# Patient Record
Sex: FEMALE | Race: WHITE | Employment: FULL TIME | URBAN - NONMETROPOLITAN AREA
[De-identification: names, ages, dates, MRNs, and addresses within clinical notes are randomized per-mention and may not be internally consistent; named-entity substitution may affect disease eponyms.]

---

## 2017-05-15 ENCOUNTER — OFFICE VISIT (OUTPATIENT)
Dept: FAMILY MEDICINE CLINIC | Age: 42
End: 2017-05-15

## 2017-05-15 VITALS
HEIGHT: 64 IN | TEMPERATURE: 98.5 F | BODY MASS INDEX: 40.29 KG/M2 | DIASTOLIC BLOOD PRESSURE: 72 MMHG | SYSTOLIC BLOOD PRESSURE: 108 MMHG | OXYGEN SATURATION: 98 % | HEART RATE: 90 BPM | WEIGHT: 236 LBS

## 2017-05-15 DIAGNOSIS — E66.01 OBESITY, MORBID, BMI 40.0-49.9 (HCC): Primary | ICD-10-CM

## 2017-05-15 PROCEDURE — G0444 DEPRESSION SCREEN ANNUAL: HCPCS | Performed by: NURSE PRACTITIONER

## 2017-05-15 PROCEDURE — 99213 OFFICE O/P EST LOW 20 MIN: CPT | Performed by: NURSE PRACTITIONER

## 2017-05-15 RX ORDER — PHENTERMINE HYDROCHLORIDE 37.5 MG/1
37.5 CAPSULE ORAL EVERY MORNING
Qty: 30 CAPSULE | Refills: 0 | Status: SHIPPED | OUTPATIENT
Start: 2017-05-15 | End: 2017-06-14

## 2017-05-15 ASSESSMENT — PATIENT HEALTH QUESTIONNAIRE - PHQ9
2. FEELING DOWN, DEPRESSED OR HOPELESS: 2
8. MOVING OR SPEAKING SO SLOWLY THAT OTHER PEOPLE COULD HAVE NOTICED. OR THE OPPOSITE, BEING SO FIGETY OR RESTLESS THAT YOU HAVE BEEN MOVING AROUND A LOT MORE THAN USUAL: 0
6. FEELING BAD ABOUT YOURSELF - OR THAT YOU ARE A FAILURE OR HAVE LET YOURSELF OR YOUR FAMILY DOWN: 1
3. TROUBLE FALLING OR STAYING ASLEEP: 2
10. IF YOU CHECKED OFF ANY PROBLEMS, HOW DIFFICULT HAVE THESE PROBLEMS MADE IT FOR YOU TO DO YOUR WORK, TAKE CARE OF THINGS AT HOME, OR GET ALONG WITH OTHER PEOPLE: 1
9. THOUGHTS THAT YOU WOULD BE BETTER OFF DEAD, OR OF HURTING YOURSELF: 0
5. POOR APPETITE OR OVEREATING: 2
4. FEELING TIRED OR HAVING LITTLE ENERGY: 2
7. TROUBLE CONCENTRATING ON THINGS, SUCH AS READING THE NEWSPAPER OR WATCHING TELEVISION: 3
SUM OF ALL RESPONSES TO PHQ9 QUESTIONS 1 & 2: 4
1. LITTLE INTEREST OR PLEASURE IN DOING THINGS: 2
SUM OF ALL RESPONSES TO PHQ QUESTIONS 1-9: 14

## 2017-06-14 ENCOUNTER — OFFICE VISIT (OUTPATIENT)
Dept: FAMILY MEDICINE CLINIC | Age: 42
End: 2017-06-14

## 2017-06-14 VITALS
DIASTOLIC BLOOD PRESSURE: 78 MMHG | TEMPERATURE: 97.7 F | HEART RATE: 94 BPM | SYSTOLIC BLOOD PRESSURE: 112 MMHG | OXYGEN SATURATION: 98 % | WEIGHT: 225.4 LBS | HEIGHT: 64 IN | BODY MASS INDEX: 38.48 KG/M2

## 2017-06-14 DIAGNOSIS — E66.9 OBESITY (BMI 35.0-39.9 WITHOUT COMORBIDITY): Primary | ICD-10-CM

## 2017-06-14 PROCEDURE — 99212 OFFICE O/P EST SF 10 MIN: CPT | Performed by: NURSE PRACTITIONER

## 2017-06-14 RX ORDER — PHENTERMINE HYDROCHLORIDE 37.5 MG/1
37.5 TABLET ORAL
Qty: 30 TABLET | Refills: 0 | Status: SHIPPED | OUTPATIENT
Start: 2017-06-14 | End: 2017-07-18 | Stop reason: SDUPTHER

## 2017-06-14 ASSESSMENT — ENCOUNTER SYMPTOMS: SHORTNESS OF BREATH: 0

## 2017-07-18 ENCOUNTER — OFFICE VISIT (OUTPATIENT)
Dept: FAMILY MEDICINE CLINIC | Age: 42
End: 2017-07-18

## 2017-07-18 VITALS
OXYGEN SATURATION: 98 % | TEMPERATURE: 98.1 F | HEIGHT: 64 IN | SYSTOLIC BLOOD PRESSURE: 122 MMHG | DIASTOLIC BLOOD PRESSURE: 78 MMHG | WEIGHT: 222.4 LBS | BODY MASS INDEX: 37.97 KG/M2 | HEART RATE: 98 BPM

## 2017-07-18 DIAGNOSIS — E66.9 OBESITY (BMI 35.0-39.9 WITHOUT COMORBIDITY): Primary | ICD-10-CM

## 2017-07-18 PROCEDURE — 99212 OFFICE O/P EST SF 10 MIN: CPT | Performed by: NURSE PRACTITIONER

## 2017-07-18 RX ORDER — PHENTERMINE HYDROCHLORIDE 37.5 MG/1
37.5 TABLET ORAL
Qty: 30 TABLET | Refills: 0 | Status: SHIPPED | OUTPATIENT
Start: 2017-07-18 | End: 2017-08-17

## 2017-07-18 RX ORDER — PHENTERMINE HYDROCHLORIDE 37.5 MG/1
37.5 TABLET ORAL
COMMUNITY
End: 2017-07-18

## 2017-07-18 ASSESSMENT — ENCOUNTER SYMPTOMS
COUGH: 0
SHORTNESS OF BREATH: 0

## 2017-08-28 ENCOUNTER — OFFICE VISIT (OUTPATIENT)
Dept: FAMILY MEDICINE CLINIC | Age: 42
End: 2017-08-28

## 2017-08-28 VITALS
TEMPERATURE: 97.5 F | DIASTOLIC BLOOD PRESSURE: 76 MMHG | WEIGHT: 221 LBS | SYSTOLIC BLOOD PRESSURE: 118 MMHG | HEIGHT: 64 IN | BODY MASS INDEX: 37.73 KG/M2

## 2017-08-28 DIAGNOSIS — M54.12 RADICULOPATHY OF CERVICAL SPINE: ICD-10-CM

## 2017-08-28 DIAGNOSIS — E66.9 OBESITY, UNSPECIFIED OBESITY SEVERITY, UNSPECIFIED OBESITY TYPE: Primary | ICD-10-CM

## 2017-08-28 PROCEDURE — 99212 OFFICE O/P EST SF 10 MIN: CPT | Performed by: NURSE PRACTITIONER

## 2017-08-28 RX ORDER — CYCLOBENZAPRINE HCL 10 MG
10 TABLET ORAL NIGHTLY PRN
Qty: 30 TABLET | Refills: 1 | Status: SHIPPED | OUTPATIENT
Start: 2017-08-28 | End: 2019-09-17 | Stop reason: SDUPTHER

## 2017-08-28 ASSESSMENT — ENCOUNTER SYMPTOMS
SHORTNESS OF BREATH: 0
COUGH: 0

## 2017-08-29 ENCOUNTER — TELEPHONE (OUTPATIENT)
Dept: FAMILY MEDICINE CLINIC | Age: 42
End: 2017-08-29

## 2017-08-29 DIAGNOSIS — E66.09 OBESITY DUE TO EXCESS CALORIES, UNSPECIFIED OBESITY SEVERITY: ICD-10-CM

## 2017-08-29 PROBLEM — E66.9 OBESITY: Status: ACTIVE | Noted: 2017-08-29

## 2017-11-02 ENCOUNTER — OFFICE VISIT (OUTPATIENT)
Dept: FAMILY MEDICINE CLINIC | Age: 42
End: 2017-11-02

## 2017-11-02 VITALS
HEIGHT: 64 IN | WEIGHT: 222.4 LBS | TEMPERATURE: 98 F | HEART RATE: 81 BPM | DIASTOLIC BLOOD PRESSURE: 70 MMHG | SYSTOLIC BLOOD PRESSURE: 110 MMHG | BODY MASS INDEX: 37.97 KG/M2 | OXYGEN SATURATION: 98 %

## 2017-11-02 DIAGNOSIS — Z13.220 LIPID SCREENING: ICD-10-CM

## 2017-11-02 DIAGNOSIS — Z13.1 DIABETES MELLITUS SCREENING: ICD-10-CM

## 2017-11-02 DIAGNOSIS — E66.9 OBESITY, UNSPECIFIED OBESITY SEVERITY, UNSPECIFIED OBESITY TYPE: ICD-10-CM

## 2017-11-02 DIAGNOSIS — Z00.00 WELL ADULT EXAM: Primary | ICD-10-CM

## 2017-11-02 LAB
CHOLESTEROL, TOTAL: 161 MG/DL (ref 0–199)
GLUCOSE BLD-MCNC: 79 MG/DL (ref 74–109)
HDLC SERPL-MCNC: 52 MG/DL (ref 40–59)
LDL CHOLESTEROL CALCULATED: 94 MG/DL (ref 0–129)
TRIGL SERPL-MCNC: 77 MG/DL (ref 0–200)

## 2017-11-02 PROCEDURE — 99396 PREV VISIT EST AGE 40-64: CPT | Performed by: NURSE PRACTITIONER

## 2017-11-02 ASSESSMENT — ENCOUNTER SYMPTOMS
SHORTNESS OF BREATH: 0
ABDOMINAL PAIN: 0
COUGH: 0
NAUSEA: 0

## 2017-11-02 NOTE — PROGRESS NOTES
Subjective  Chief Complaint   Patient presents with    3 Month Follow-Up     Aug     Medication Check     pt taking belviq, up 1 lb from Aug. any more suggestions for weight loss     Medication Refill     Belviq will need PA after this fill      HPI     Pt here for a follow-up, taking belviq daily  No serious side effects that she has noticed - some dry mouth, vivid dreams  Is working out 3-4 times a week and watching what she is eating  Feels like the belviq is helping curb her cravings & she is not binge eating   Has gained 1 lb but feels like her clothes are fitting better    Needs labs performed & paperwork filled out for insurance    Patient Active Problem List    Diagnosis Date Noted    Obesity 08/29/2017    Disorder of intervertebral disc at C5-C6 level with radiculopathy 11/22/2016    Cervical disc disorder at C5-C6 level with radiculopathy 10/27/2016    IBS (irritable bowel syndrome) 10/27/2016    Polycystic ovary syndrome 10/27/2016    Asthma 10/27/2016    Depression with anxiety 10/27/2016    Sinus congestion 10/27/2016    Black-out (not amnesia) 10/27/2016    Herniated cervical disc 10/14/2016    Cervical radiculopathy at C6 10/14/2016    Anemia 10/28/2013     Past Medical History:   Diagnosis Date    Anxiety and depression     Asthma     childhood only    Chronic back pain     Depression     Irritable bowel syndrome     Mental disorder     PCOS (polycystic ovarian syndrome)      Past Surgical History:   Procedure Laterality Date    BACK SURGERY      lumbar microdiscectomy x 3     BLADDER SUSPENSION  2013    BREAST BIOPSY Bilateral 1/16/13    U/S Guided core bx of 2 solid nodules int he right and left breast    CERVICAL FUSION N/A 10/28/2016    ACDF ANTERIOR CERVICAL DISKECTOMY FUSION C5-6 USING CC TRIAD HELIX MINI-PLATE, 1.5 HRS, NUVASIVE REP  performed by Mary Espinoza MD at [address] [place] [address]      2    CHOLECYSTECTOMY      COLONOSCOPY      ELBOW SURGERY

## 2017-12-06 ENCOUNTER — TELEPHONE (OUTPATIENT)
Dept: FAMILY MEDICINE CLINIC | Age: 42
End: 2017-12-06

## 2017-12-12 NOTE — TELEPHONE ENCOUNTER
Alternatives for 2018 is contrave. PA for [facility]io faxed, waiting on response before attempting switch!

## 2017-12-19 NOTE — TELEPHONE ENCOUNTER
PA attempted on Belviq and declined. Please see 12/12/17 Media! Looks like they are covering Contrave?

## 2018-02-26 ENCOUNTER — OFFICE VISIT (OUTPATIENT)
Dept: FAMILY MEDICINE CLINIC | Age: 43
End: 2018-02-26
Payer: COMMERCIAL

## 2018-02-26 VITALS
HEART RATE: 81 BPM | TEMPERATURE: 97.4 F | OXYGEN SATURATION: 99 % | SYSTOLIC BLOOD PRESSURE: 118 MMHG | DIASTOLIC BLOOD PRESSURE: 72 MMHG | BODY MASS INDEX: 39.06 KG/M2 | WEIGHT: 228.8 LBS | HEIGHT: 64 IN

## 2018-02-26 PROCEDURE — G8417 CALC BMI ABV UP PARAM F/U: HCPCS | Performed by: NURSE PRACTITIONER

## 2018-02-26 PROCEDURE — 1036F TOBACCO NON-USER: CPT | Performed by: NURSE PRACTITIONER

## 2018-02-26 PROCEDURE — G8427 DOCREV CUR MEDS BY ELIG CLIN: HCPCS | Performed by: NURSE PRACTITIONER

## 2018-02-26 PROCEDURE — G8482 FLU IMMUNIZE ORDER/ADMIN: HCPCS | Performed by: NURSE PRACTITIONER

## 2018-02-26 PROCEDURE — 99213 OFFICE O/P EST LOW 20 MIN: CPT | Performed by: NURSE PRACTITIONER

## 2018-02-26 ASSESSMENT — ENCOUNTER SYMPTOMS
SHORTNESS OF BREATH: 0
COUGH: 0

## 2018-02-26 NOTE — PROGRESS NOTES
DIAGNOSTIC      FRACTURE SURGERY      fx / left elbow / pins in & then removed    GALLBLADDER SURGERY      HYSTERECTOMY, TOTAL ABDOMINAL  04/07/16     [address] [facility]    lumbar    TUBAL LIGATION      UMBILICAL HERNIA REPAIR  10/20/15    Dirk Sanchez MD     Family History   Problem Relation Age of Onset   [facility] Cancer Mother 47     breast    Hypertension Mother     High Blood Pressure Mother     Other Father      car accident    Cancer Maternal Grandmother      colon    Diabetes Paternal Grandfather      Social History     Social History    Marital status:      Spouse name: N/A    Number of children: N/A    Years of education: N/A     Social History Main Topics    Smoking status: Former Smoker     Packs/day: 0.50     Years: 10.00     Types: Cigarettes     Quit date: 11/14/2004    Smokeless tobacco: Never Used    Alcohol use Yes      Comment: socially    Drug use: No    Sexual activity: Yes     Other Topics Concern    None     Social History Narrative    None     Current Outpatient Prescriptions on File Prior to Visit   Medication Sig Dispense Refill    cyclobenzaprine (FLEXERIL) 10 MG tablet Take 1 tablet by mouth nightly as needed for Muscle spasms 30 tablet 1    NONFORMULARY 2 capsules 2 times daily biotears      cetirizine (ZYRTEC) 10 MG tablet Take 10 mg by mouth daily as needed       traMADol (ULTRAM) 50 MG tablet Take 1 tablet by mouth every 6 hours as needed for Pain 180 tablet 1    Biotin 1000 MCG TABS Take  by mouth. No current facility-administered medications on file prior to visit. Allergies   Allergen Reactions    Adhesive Tape Rash    Macrobid [Nitrofurantoin Monohydrate Macrocrystals] Rash    Sulfa Antibiotics Rash       Review of Systems   Respiratory: Negative for cough and shortness of breath. Cardiovascular: Negative for chest pain.        Objective  Vitals:    02/26/18 0801   BP: 118/72   Site: Left Arm   Position: Sitting

## 2018-05-23 ENCOUNTER — OFFICE VISIT (OUTPATIENT)
Dept: FAMILY MEDICINE CLINIC | Age: 43
End: 2018-05-23
Payer: COMMERCIAL

## 2018-05-23 VITALS
SYSTOLIC BLOOD PRESSURE: 122 MMHG | TEMPERATURE: 98 F | HEART RATE: 97 BPM | DIASTOLIC BLOOD PRESSURE: 72 MMHG | HEIGHT: 64 IN | OXYGEN SATURATION: 98 % | BODY MASS INDEX: 37.05 KG/M2 | WEIGHT: 217 LBS

## 2018-05-23 DIAGNOSIS — E66.09 CLASS 2 OBESITY DUE TO EXCESS CALORIES WITHOUT SERIOUS COMORBIDITY WITH BODY MASS INDEX (BMI) OF 37.0 TO 37.9 IN ADULT: Primary | ICD-10-CM

## 2018-05-23 DIAGNOSIS — F32.A DEPRESSION, UNSPECIFIED DEPRESSION TYPE: ICD-10-CM

## 2018-05-23 DIAGNOSIS — Z13.31 POSITIVE DEPRESSION SCREENING: ICD-10-CM

## 2018-05-23 PROCEDURE — G8417 CALC BMI ABV UP PARAM F/U: HCPCS | Performed by: NURSE PRACTITIONER

## 2018-05-23 PROCEDURE — 96160 PT-FOCUSED HLTH RISK ASSMT: CPT | Performed by: NURSE PRACTITIONER

## 2018-05-23 PROCEDURE — 99213 OFFICE O/P EST LOW 20 MIN: CPT | Performed by: NURSE PRACTITIONER

## 2018-05-23 PROCEDURE — 1036F TOBACCO NON-USER: CPT | Performed by: NURSE PRACTITIONER

## 2018-05-23 PROCEDURE — G8431 POS CLIN DEPRES SCRN F/U DOC: HCPCS | Performed by: NURSE PRACTITIONER

## 2018-05-23 PROCEDURE — G8427 DOCREV CUR MEDS BY ELIG CLIN: HCPCS | Performed by: NURSE PRACTITIONER

## 2018-05-23 ASSESSMENT — PATIENT HEALTH QUESTIONNAIRE - PHQ9
7. TROUBLE CONCENTRATING ON THINGS, SUCH AS READING THE NEWSPAPER OR WATCHING TELEVISION: 3
9. THOUGHTS THAT YOU WOULD BE BETTER OFF DEAD, OR OF HURTING YOURSELF: 0
2. FEELING DOWN, DEPRESSED OR HOPELESS: 2
8. MOVING OR SPEAKING SO SLOWLY THAT OTHER PEOPLE COULD HAVE NOTICED. OR THE OPPOSITE, BEING SO FIGETY OR RESTLESS THAT YOU HAVE BEEN MOVING AROUND A LOT MORE THAN USUAL: 0
SUM OF ALL RESPONSES TO PHQ9 QUESTIONS 1 & 2: 5
10. IF YOU CHECKED OFF ANY PROBLEMS, HOW DIFFICULT HAVE THESE PROBLEMS MADE IT FOR YOU TO DO YOUR WORK, TAKE CARE OF THINGS AT HOME, OR GET ALONG WITH OTHER PEOPLE: 2
SUM OF ALL RESPONSES TO PHQ QUESTIONS 1-9: 17
4. FEELING TIRED OR HAVING LITTLE ENERGY: 3
6. FEELING BAD ABOUT YOURSELF - OR THAT YOU ARE A FAILURE OR HAVE LET YOURSELF OR YOUR FAMILY DOWN: 1
5. POOR APPETITE OR OVEREATING: 3
3. TROUBLE FALLING OR STAYING ASLEEP: 2
1. LITTLE INTEREST OR PLEASURE IN DOING THINGS: 3

## 2018-05-23 ASSESSMENT — ENCOUNTER SYMPTOMS
SHORTNESS OF BREATH: 0
COUGH: 0

## 2018-08-23 ENCOUNTER — OFFICE VISIT (OUTPATIENT)
Dept: FAMILY MEDICINE CLINIC | Age: 43
End: 2018-08-23
Payer: COMMERCIAL

## 2018-08-23 VITALS
HEIGHT: 64 IN | SYSTOLIC BLOOD PRESSURE: 128 MMHG | DIASTOLIC BLOOD PRESSURE: 80 MMHG | BODY MASS INDEX: 33.43 KG/M2 | HEART RATE: 86 BPM | WEIGHT: 195.8 LBS | OXYGEN SATURATION: 99 % | TEMPERATURE: 98.1 F

## 2018-08-23 DIAGNOSIS — E66.9 OBESITY (BMI 30.0-34.9): Primary | ICD-10-CM

## 2018-08-23 PROCEDURE — G8427 DOCREV CUR MEDS BY ELIG CLIN: HCPCS | Performed by: NURSE PRACTITIONER

## 2018-08-23 PROCEDURE — 99213 OFFICE O/P EST LOW 20 MIN: CPT | Performed by: NURSE PRACTITIONER

## 2018-08-23 PROCEDURE — 1036F TOBACCO NON-USER: CPT | Performed by: NURSE PRACTITIONER

## 2018-08-23 PROCEDURE — G8417 CALC BMI ABV UP PARAM F/U: HCPCS | Performed by: NURSE PRACTITIONER

## 2018-08-23 ASSESSMENT — ENCOUNTER SYMPTOMS
SHORTNESS OF BREATH: 0
COUGH: 0
DIARRHEA: 0
CONSTIPATION: 0

## 2018-08-23 NOTE — PROGRESS NOTES
08/23/18 0853   BP: 128/80   Pulse: 86   Temp: 98.1 °F (36.7 °C)   TempSrc: Tympanic   SpO2: 99%   Weight: 195 lb 12.8 oz (88.8 kg)   Height: 5' 4\" (1.626 m)     Physical Exam   Constitutional: She is oriented to person, place, and time. She appears well-developed and well-nourished. Eyes: Pupils are equal, round, and reactive to light. Conjunctivae are normal.   Cardiovascular: Normal rate, regular rhythm, normal heart sounds and intact distal pulses. Pulmonary/Chest: Effort normal and breath sounds normal.   Neurological: She is alert and oriented to person, place, and time. Psychiatric: She has a normal mood and affect. Her behavior is normal. Judgment and thought content normal.     Assessment & Plan     Diagnosis Orders   1. Obesity (BMI 30.0-34.9)  naltrexone-bupropion (CONTRAVE) 8-90 MG per extended release tablet       No orders of the defined types were placed in this encounter. Orders Placed This Encounter   Medications    naltrexone-bupropion (CONTRAVE) 8-90 MG per extended release tablet     Sig: Take 2 tablets by mouth 2 times daily     Dispense:  120 tablet     Refill:  1       Side effects, adverse effects of the medication prescribed today, as well as treatment plan/ rationale and result expectations have been discussed with the patient who expresses understanding and desires to proceed. Close follow up to evaluate treatment results and for coordination of care. I have reviewed the patient's medical history in detail and updated the computerized patient record. As always, patient is advised that if symptoms worsen in any way they will proceed to the nearest emergency room. [place] in 2 mos.     MAURICIO Hernandez - CNP

## 2018-08-29 ENCOUNTER — OFFICE VISIT (OUTPATIENT)
Dept: FAMILY MEDICINE CLINIC | Age: 43
End: 2018-08-29
Payer: COMMERCIAL

## 2018-08-29 VITALS
SYSTOLIC BLOOD PRESSURE: 124 MMHG | WEIGHT: 186 LBS | BODY MASS INDEX: 31.76 KG/M2 | TEMPERATURE: 98.7 F | OXYGEN SATURATION: 98 % | HEART RATE: 86 BPM | HEIGHT: 64 IN | DIASTOLIC BLOOD PRESSURE: 60 MMHG

## 2018-08-29 DIAGNOSIS — R73.9 HYPERGLYCEMIA: ICD-10-CM

## 2018-08-29 DIAGNOSIS — L03.116 CELLULITIS OF LEFT LOWER EXTREMITY: ICD-10-CM

## 2018-08-29 DIAGNOSIS — T24.232A PARTIAL THICKNESS BURN OF LEFT LOWER LEG, INITIAL ENCOUNTER: Primary | ICD-10-CM

## 2018-08-29 PROCEDURE — 1036F TOBACCO NON-USER: CPT | Performed by: NURSE PRACTITIONER

## 2018-08-29 PROCEDURE — G8427 DOCREV CUR MEDS BY ELIG CLIN: HCPCS | Performed by: NURSE PRACTITIONER

## 2018-08-29 PROCEDURE — 99213 OFFICE O/P EST LOW 20 MIN: CPT | Performed by: NURSE PRACTITIONER

## 2018-08-29 PROCEDURE — G8417 CALC BMI ABV UP PARAM F/U: HCPCS | Performed by: NURSE PRACTITIONER

## 2018-08-29 RX ORDER — CEPHALEXIN 500 MG/1
500 CAPSULE ORAL 3 TIMES DAILY
Qty: 30 CAPSULE | Refills: 0 | Status: SHIPPED | OUTPATIENT
Start: 2018-08-29 | End: 2018-09-08

## 2018-08-29 ASSESSMENT — ENCOUNTER SYMPTOMS
RESPIRATORY NEGATIVE: 1
GASTROINTESTINAL NEGATIVE: 1
COLOR CHANGE: 1

## 2018-08-29 NOTE — PROGRESS NOTES
elbow / pins in & then removed    GALLBLADDER SURGERY      HYSTERECTOMY, TOTAL ABDOMINAL  04/07/16     [facility]    lumbar    TUBAL LIGATION      UMBILICAL HERNIA REPAIR  10/20/15    Wagner Alvarez MD     Family History   Problem Relation Age of Onset   [facility] Cancer Mother 47        breast    Hypertension Mother     High Blood Pressure Mother     Other Father         car accident    Cancer Maternal Grandmother         colon    Diabetes Paternal Grandfather      Social History     Social History    Marital status:      Spouse name: N/A    Number of children: N/A    Years of education: N/A     Social History Main Topics    Smoking status: Former Smoker     Packs/day: 0.50     Years: 10.00     Types: Cigarettes     Quit date: 11/14/2004    Smokeless tobacco: Never Used    Alcohol use Yes      Comment: socially    Drug use: No    Sexual activity: Yes     Other Topics Concern    None     Social History Narrative    None     Current Outpatient Prescriptions on File Prior to Visit   Medication Sig Dispense Refill    naltrexone-bupropion (CONTRAVE) 8-90 MG per extended release tablet Take 2 tablets by mouth 2 times daily 120 tablet 1    cyclobenzaprine (FLEXERIL) 10 MG tablet Take 1 tablet by mouth nightly as needed for Muscle spasms 30 tablet 1    NONFORMULARY 2 capsules 2 times daily biotears      cetirizine (ZYRTEC) 10 MG tablet Take 10 mg by mouth daily as needed       traMADol (ULTRAM) 50 MG tablet Take 1 tablet by mouth every 6 hours as needed for Pain 180 tablet 1    Biotin 1000 MCG TABS Take  by mouth. No current facility-administered medications on file prior to visit. Allergies   Allergen Reactions    Adhesive Tape Rash    Macrobid [Nitrofurantoin Monohydrate Macrocrystals] Rash    Sulfa Antibiotics Rash       Review of Systems   Constitutional: Negative. HENT: Negative. Respiratory: Negative. Cardiovascular: Negative. capsule by mouth 3 times daily for 10 days     Dispense:  30 capsule     Refill:  0     Discussed with pt to change dressings daily prn, until wound care consult is complete and to continue silvedene prn. Discussed with pt to take kefflex as directed until wound care consult begins. Side effects, adverse effects of the medication prescribed today, as well as treatment plan/ rationale and result expectations have been discussed with the patient who expresses understanding and desires to proceed. Close follow up to evaluate treatment results and for coordination of care. I have reviewed the patient's medical history in detail and updated the computerized patient record. As always, patient is advised that if symptoms worsen in any way they will proceed to the nearest emergency room.      Return if symptoms worsen or fail to improve, for burn sx unresolved based on wound care referral.    Luisa Perez, APRN - CNP

## 2018-10-22 ENCOUNTER — OFFICE VISIT (OUTPATIENT)
Dept: FAMILY MEDICINE CLINIC | Age: 43
End: 2018-10-22
Payer: COMMERCIAL

## 2018-10-22 VITALS
HEIGHT: 64 IN | WEIGHT: 193 LBS | BODY MASS INDEX: 32.95 KG/M2 | TEMPERATURE: 98.1 F | OXYGEN SATURATION: 99 % | DIASTOLIC BLOOD PRESSURE: 80 MMHG | SYSTOLIC BLOOD PRESSURE: 114 MMHG | HEART RATE: 94 BPM

## 2018-10-22 DIAGNOSIS — Z23 NEED FOR PNEUMOCOCCAL VACCINATION: ICD-10-CM

## 2018-10-22 DIAGNOSIS — E66.9 OBESITY (BMI 30.0-34.9): ICD-10-CM

## 2018-10-22 DIAGNOSIS — E66.01 CLASS 3 SEVERE OBESITY DUE TO EXCESS CALORIES WITHOUT SERIOUS COMORBIDITY WITH BODY MASS INDEX (BMI) OF 40.0 TO 44.9 IN ADULT (HCC): Primary | ICD-10-CM

## 2018-10-22 PROCEDURE — 99213 OFFICE O/P EST LOW 20 MIN: CPT | Performed by: NURSE PRACTITIONER

## 2018-10-22 PROCEDURE — 90471 IMMUNIZATION ADMIN: CPT | Performed by: NURSE PRACTITIONER

## 2018-10-22 PROCEDURE — G8417 CALC BMI ABV UP PARAM F/U: HCPCS | Performed by: NURSE PRACTITIONER

## 2018-10-22 PROCEDURE — 1036F TOBACCO NON-USER: CPT | Performed by: NURSE PRACTITIONER

## 2018-10-22 PROCEDURE — G8427 DOCREV CUR MEDS BY ELIG CLIN: HCPCS | Performed by: NURSE PRACTITIONER

## 2018-10-22 PROCEDURE — 90732 PPSV23 VACC 2 YRS+ SUBQ/IM: CPT | Performed by: NURSE PRACTITIONER

## 2018-10-22 PROCEDURE — G8484 FLU IMMUNIZE NO ADMIN: HCPCS | Performed by: NURSE PRACTITIONER

## 2018-10-22 ASSESSMENT — ENCOUNTER SYMPTOMS: SHORTNESS OF BREATH: 0

## 2018-10-22 NOTE — PROGRESS NOTES
ABLATION  2014    ENDOSCOPY, COLON, DIAGNOSTIC      FRACTURE SURGERY      fx / left elbow / pins in & then removed    GALLBLADDER SURGERY      HYSTERECTOMY, TOTAL ABDOMINAL  04/07/16     [facility]  [facility]    lumbar    TUBAL LIGATION      UMBILICAL HERNIA REPAIR  10/20/15    Janis Davies MD     Family History   Problem Relation Age of Onset   [facility] Cancer Mother 47        breast    Hypertension Mother     High Blood Pressure Mother     Other Father         car accident    Cancer Maternal Grandmother         colon    Diabetes Paternal Grandfather      Social History     Social History    Marital status:      Spouse name: N/A    Number of children: N/A    Years of education: N/A     Social History Main Topics    Smoking status: Former Smoker     Packs/day: 0.50     Years: 10.00     Types: Cigarettes     Quit date: 11/14/2004    Smokeless tobacco: Never Used    Alcohol use Yes      Comment: socially    Drug use: No    Sexual activity: Yes     Other Topics Concern    None     Social History Narrative    None     Current Outpatient Prescriptions on File Prior to Visit   Medication Sig Dispense Refill    naltrexone-bupropion (CONTRAVE) 8-90 MG per extended release tablet Take 2 tablets by mouth 2 times daily 120 tablet 1    cyclobenzaprine (FLEXERIL) 10 MG tablet Take 1 tablet by mouth nightly as needed for Muscle spasms 30 tablet 1    NONFORMULARY 2 capsules 2 times daily biotears      cetirizine (ZYRTEC) 10 MG tablet Take 10 mg by mouth daily as needed       traMADol (ULTRAM) 50 MG tablet Take 1 tablet by mouth every 6 hours as needed for Pain 180 tablet 1    Biotin 1000 MCG TABS Take  by mouth. No current facility-administered medications on file prior to visit.       Allergies   Allergen Reactions    Adhesive Tape Rash    Macrobid [Nitrofurantoin Monohydrate Macrocrystals] Rash    Sulfa Antibiotics Rash       Review of Systems   Constitutional: Negative

## 2018-11-05 ENCOUNTER — OFFICE VISIT (OUTPATIENT)
Dept: FAMILY MEDICINE CLINIC | Age: 43
End: 2018-11-05
Payer: COMMERCIAL

## 2018-11-05 VITALS
TEMPERATURE: 97.9 F | HEIGHT: 64 IN | HEART RATE: 89 BPM | DIASTOLIC BLOOD PRESSURE: 68 MMHG | SYSTOLIC BLOOD PRESSURE: 124 MMHG | WEIGHT: 193 LBS | BODY MASS INDEX: 32.95 KG/M2 | OXYGEN SATURATION: 98 %

## 2018-11-05 DIAGNOSIS — R10.13 EPIGASTRIC PAIN: ICD-10-CM

## 2018-11-05 DIAGNOSIS — Z13.220 LIPID SCREENING: ICD-10-CM

## 2018-11-05 DIAGNOSIS — Z13.1 DIABETES MELLITUS SCREENING: ICD-10-CM

## 2018-11-05 DIAGNOSIS — M54.50 CHRONIC MIDLINE LOW BACK PAIN WITHOUT SCIATICA: ICD-10-CM

## 2018-11-05 DIAGNOSIS — Z00.00 WELL ADULT EXAM: Primary | ICD-10-CM

## 2018-11-05 DIAGNOSIS — G89.29 CHRONIC MIDLINE LOW BACK PAIN WITHOUT SCIATICA: ICD-10-CM

## 2018-11-05 LAB
CHOLESTEROL, TOTAL: 198 MG/DL (ref 0–199)
GLUCOSE FASTING: 67 MG/DL (ref 74–109)
HDLC SERPL-MCNC: 66 MG/DL (ref 40–59)
LDL CHOLESTEROL CALCULATED: 106 MG/DL (ref 0–129)
TRIGL SERPL-MCNC: 131 MG/DL (ref 0–200)

## 2018-11-05 PROCEDURE — G8484 FLU IMMUNIZE NO ADMIN: HCPCS | Performed by: NURSE PRACTITIONER

## 2018-11-05 PROCEDURE — 99396 PREV VISIT EST AGE 40-64: CPT | Performed by: NURSE PRACTITIONER

## 2018-11-05 RX ORDER — TRAMADOL HYDROCHLORIDE 50 MG/1
50 TABLET ORAL EVERY 8 HOURS PRN
Qty: 90 TABLET | Refills: 0 | Status: SHIPPED | OUTPATIENT
Start: 2018-11-05 | End: 2018-12-05

## 2018-11-05 ASSESSMENT — ENCOUNTER SYMPTOMS
EYES NEGATIVE: 1
RESPIRATORY NEGATIVE: 1
BACK PAIN: 1
ABDOMINAL PAIN: 1

## 2018-11-05 NOTE — PROGRESS NOTES
Subjective  Chief Complaint   Patient presents with    Employment Physical    Medication Refill       HPI     Pt presents for well adult exam.   Overall pt has been doing well. Has been watching diet and trying to exercise more while on the contrave. Has been having abdominal pain nearly every other day. Hx of hernia repair, pain is in similar location. States that sometimes it feels like its bulging. Has been compliant with medications. Also has chronic pack and hip pain that has been going on for several years. Has been taking NSAIDS which provide occasional relief.     Patient Active Problem List    Diagnosis Date Noted    Obesity 08/29/2017    Disorder of intervertebral disc at C5-C6 level with radiculopathy 11/22/2016    Cervical disc disorder at C5-C6 level with radiculopathy 10/27/2016    IBS (irritable bowel syndrome) 10/27/2016    Polycystic ovary syndrome 10/27/2016    Asthma 10/27/2016    Depression with anxiety 10/27/2016    Sinus congestion 10/27/2016    Black-out (not amnesia) 10/27/2016    Herniated cervical disc 10/14/2016    Cervical radiculopathy at C6 10/14/2016    Anemia 10/28/2013     Past Medical History:   Diagnosis Date    Anxiety and depression     Asthma     childhood only    Chronic back pain     Depression     Irritable bowel syndrome     Mental disorder     PCOS (polycystic ovarian syndrome)      Past Surgical History:   Procedure Laterality Date    BACK SURGERY      lumbar microdiscectomy x 3     BLADDER SUSPENSION  2013    BREAST BIOPSY Bilateral 1/16/13    U/S Guided core bx of 2 solid nodules int he right and left breast    CERVICAL FUSION N/A 10/28/2016    ACDF ANTERIOR CERVICAL DISKECTOMY FUSION C5-6 USING CC TRIAD HELIX MINI-PLATE, 1.5 HRS, NUVASIVE REP  performed by Den Guerrero MD at [address] [address]      2    CHOLECYSTECTOMY      COLONOSCOPY      ELBOW SURGERY      pins in and out, pt broke tip of elbow    ENDOMETRIAL ABLATION  2014    ENDOSCOPY, COLON, DIAGNOSTIC      FRACTURE SURGERY      fx / left elbow / pins in & then removed    GALLBLADDER SURGERY      HYSTERECTOMY, TOTAL ABDOMINAL  04/07/16     [address] [facility]  [facility]    lumbar    TUBAL LIGATION      UMBILICAL HERNIA REPAIR  10/20/15    Rojelio Steiner MD     Family History   Problem Relation Age of Onset   [employer] Cancer Mother 47        breast    Hypertension Mother     High Blood Pressure Mother     Other Father         car accident    Cancer Maternal Grandmother         colon    Diabetes Paternal Grandfather      Social History     Social History    Marital status:      Spouse name: N/A    Number of children: N/A    Years of education: N/A     Social History Main Topics    Smoking status: Former Smoker     Packs/day: 0.50     Years: 10.00     Types: Cigarettes     Quit date: 11/14/2004    Smokeless tobacco: Never Used    Alcohol use Yes      Comment: socially    Drug use: No    Sexual activity: Yes     Other Topics Concern    Not on file     Social History Narrative    No narrative on file     Current Outpatient Prescriptions on File Prior to Visit   Medication Sig Dispense Refill    naltrexone-bupropion (CONTRAVE) 8-90 MG per extended release tablet Take 2 tablets by mouth 2 times daily 120 tablet 1    cyclobenzaprine (FLEXERIL) 10 MG tablet Take 1 tablet by mouth nightly as needed for Muscle spasms 30 tablet 1    NONFORMULARY 2 capsules 2 times daily biotears      cetirizine (ZYRTEC) 10 MG tablet Take 10 mg by mouth daily as needed       Biotin 1000 MCG TABS Take  by mouth. No current facility-administered medications on file prior to visit. Allergies   Allergen Reactions    Adhesive Tape Rash    Macrobid [Nitrofurantoin Monohydrate Macrocrystals] Rash    Sulfa Antibiotics Rash       Review of Systems   Constitutional: Negative. HENT: Negative. Eyes: Negative. Respiratory: Negative.

## 2018-11-13 ENCOUNTER — HOSPITAL ENCOUNTER (OUTPATIENT)
Dept: CT IMAGING | Age: 43
Discharge: HOME | End: 2018-11-15
Payer: COMMERCIAL

## 2018-11-13 DIAGNOSIS — K43.9 SUPRAUMBILICAL HERNIA: Primary | ICD-10-CM

## 2018-11-13 DIAGNOSIS — R10.13 EPIGASTRIC PAIN: ICD-10-CM

## 2018-11-13 PROCEDURE — 2500000003 HC RX 250 WO HCPCS: Performed by: NURSE PRACTITIONER

## 2018-11-13 PROCEDURE — 6360000004 HC RX CONTRAST MEDICATION: Performed by: NURSE PRACTITIONER

## 2018-11-13 PROCEDURE — 74177 CT ABD & PELVIS W/CONTRAST: CPT

## 2018-11-13 RX ADMIN — IOPAMIDOL 100 ML: 755 INJECTION, SOLUTION INTRAVENOUS at 13:36

## 2018-11-13 RX ADMIN — BARIUM SULFATE 450 ML: 20 SUSPENSION ORAL at 12:37

## 2018-11-23 ENCOUNTER — OFFICE VISIT (OUTPATIENT)
Dept: SURGERY | Age: 43
End: 2018-11-23
Payer: COMMERCIAL

## 2018-11-23 VITALS
HEIGHT: 64 IN | BODY MASS INDEX: 34.66 KG/M2 | WEIGHT: 203 LBS | TEMPERATURE: 97.6 F | SYSTOLIC BLOOD PRESSURE: 110 MMHG | DIASTOLIC BLOOD PRESSURE: 70 MMHG

## 2018-11-23 DIAGNOSIS — K43.2 RECURRENT VENTRAL HERNIA: ICD-10-CM

## 2018-11-23 PROCEDURE — 99213 OFFICE O/P EST LOW 20 MIN: CPT | Performed by: SURGERY

## 2018-11-23 PROCEDURE — G8484 FLU IMMUNIZE NO ADMIN: HCPCS | Performed by: SURGERY

## 2018-11-23 PROCEDURE — 1036F TOBACCO NON-USER: CPT | Performed by: SURGERY

## 2018-11-23 PROCEDURE — G8427 DOCREV CUR MEDS BY ELIG CLIN: HCPCS | Performed by: SURGERY

## 2018-11-23 PROCEDURE — G8417 CALC BMI ABV UP PARAM F/U: HCPCS | Performed by: SURGERY

## 2018-11-23 NOTE — PROGRESS NOTES
biotears      cetirizine (ZYRTEC) 10 MG tablet Take 10 mg by mouth daily as needed       Biotin 1000 MCG TABS Take  by mouth. No current facility-administered medications on file prior to visit. Allergies   Allergen Reactions    Adhesive Tape Rash    Macrobid [Nitrofurantoin Monohydrate Macrocrystals] Rash    Sulfa Antibiotics Rash     Family History   Problem Relation Age of Onset   Kristine Giron Cancer Mother 47        breast    Hypertension Mother     High Blood Pressure Mother     Other Father         car accident    Cancer Maternal Grandmother         colon    Diabetes Paternal Grandfather      Social History   Substance Use Topics    Smoking status: Former Smoker     Packs/day: 0.50     Years: 10.00     Types: Cigarettes     Quit date: 11/14/2004    Smokeless tobacco: Never Used    Alcohol use Yes      Comment: socially     Review of Systems  The pain is intermittent and worse with activity. The pain does wake her at night at times. There is some nausea but no vomiting. She has irritable bowel syndrome but has noted more problems with constipation lately. She has lost weight since her previous surgery. 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 Objective:   Physical Exam   Abdominal:         She does not appear ill or toxic. Breath sounds are clear. The cardiac exam is fine. There is no murmur. The abdomen is not distended. There is a well healed surgical scar inferior to the umbilicus. There is a vague tender mass noted in the midline just above the umbilicus. I reviewed the report of CT scan abdomen and pelvis done 11/13/2018. There is a 0.9 mm fat filled anterior wall defect, 9 mm at the superior aspect of the mesh. Bilateral adnexal cysts are noted. Assessment:      Recurrent ventral hernia, probably at the top of the previously placed mesh. Plan:      Because of the discomfort she would like the hernia repaired. Details of the surgery were reviewed with her.  I

## 2018-12-06 ENCOUNTER — HOSPITAL ENCOUNTER (OUTPATIENT)
Dept: PREADMISSION TESTING | Age: 43
Discharge: HOME | End: 2018-12-10
Payer: COMMERCIAL

## 2018-12-06 VITALS
WEIGHT: 202.8 LBS | DIASTOLIC BLOOD PRESSURE: 68 MMHG | HEIGHT: 65 IN | BODY MASS INDEX: 33.79 KG/M2 | OXYGEN SATURATION: 99 % | HEART RATE: 85 BPM | TEMPERATURE: 97.3 F | RESPIRATION RATE: 18 BRPM | SYSTOLIC BLOOD PRESSURE: 115 MMHG

## 2018-12-06 LAB
HCT VFR BLD CALC: 40 % (ref 37–47)
HEMOGLOBIN: 13.7 G/DL (ref 12–16)
MCH RBC QN AUTO: 31.7 PG (ref 27–31.3)
MCHC RBC AUTO-ENTMCNC: 34.2 % (ref 33–37)
MCV RBC AUTO: 92.7 FL (ref 82–100)
PDW BLD-RTO: 13.8 % (ref 11.5–14.5)
PLATELET # BLD: 283 K/UL (ref 130–400)
RBC # BLD: 4.32 M/UL (ref 4.2–5.4)
WBC # BLD: 6.9 K/UL (ref 4.8–10.8)

## 2018-12-06 PROCEDURE — 85027 COMPLETE CBC AUTOMATED: CPT

## 2018-12-06 RX ORDER — SODIUM CHLORIDE 9 MG/ML
INJECTION, SOLUTION INTRAVENOUS CONTINUOUS
Status: CANCELLED | OUTPATIENT
Start: 2018-12-13

## 2018-12-06 RX ORDER — SODIUM CHLORIDE 0.9 % (FLUSH) 0.9 %
10 SYRINGE (ML) INJECTION EVERY 12 HOURS SCHEDULED
Status: CANCELLED | OUTPATIENT
Start: 2018-12-13

## 2018-12-06 RX ORDER — SODIUM CHLORIDE, SODIUM LACTATE, POTASSIUM CHLORIDE, CALCIUM CHLORIDE 600; 310; 30; 20 MG/100ML; MG/100ML; MG/100ML; MG/100ML
INJECTION, SOLUTION INTRAVENOUS CONTINUOUS
Status: CANCELLED | OUTPATIENT
Start: 2018-12-13

## 2018-12-06 RX ORDER — SODIUM CHLORIDE 0.9 % (FLUSH) 0.9 %
10 SYRINGE (ML) INJECTION PRN
Status: CANCELLED | OUTPATIENT
Start: 2018-12-13

## 2018-12-06 RX ORDER — LIDOCAINE HYDROCHLORIDE 10 MG/ML
1 INJECTION, SOLUTION EPIDURAL; INFILTRATION; INTRACAUDAL; PERINEURAL
Status: CANCELLED | OUTPATIENT
Start: 2018-12-13 | End: 2018-12-13

## 2018-12-13 ENCOUNTER — HOSPITAL ENCOUNTER (OUTPATIENT)
Age: 43
Setting detail: OUTPATIENT SURGERY
Discharge: HOME | End: 2018-12-13
Attending: SURGERY | Admitting: SURGERY
Payer: COMMERCIAL

## 2018-12-13 ENCOUNTER — ANESTHESIA (OUTPATIENT)
Dept: OPERATING ROOM | Age: 43
End: 2018-12-13
Payer: COMMERCIAL

## 2018-12-13 ENCOUNTER — ANESTHESIA EVENT (OUTPATIENT)
Dept: OPERATING ROOM | Age: 43
End: 2018-12-13
Payer: COMMERCIAL

## 2018-12-13 VITALS — OXYGEN SATURATION: 100 % | TEMPERATURE: 96.1 F | DIASTOLIC BLOOD PRESSURE: 67 MMHG | SYSTOLIC BLOOD PRESSURE: 124 MMHG

## 2018-12-13 VITALS
BODY MASS INDEX: 33.66 KG/M2 | HEIGHT: 65 IN | SYSTOLIC BLOOD PRESSURE: 128 MMHG | HEART RATE: 74 BPM | WEIGHT: 202 LBS | TEMPERATURE: 97.9 F | OXYGEN SATURATION: 97 % | DIASTOLIC BLOOD PRESSURE: 78 MMHG | RESPIRATION RATE: 16 BRPM

## 2018-12-13 DIAGNOSIS — G89.18 POST-OP PAIN: ICD-10-CM

## 2018-12-13 DIAGNOSIS — K43.2 RECURRENT VENTRAL HERNIA: Primary | ICD-10-CM

## 2018-12-13 PROCEDURE — C1781 MESH (IMPLANTABLE): HCPCS | Performed by: SURGERY

## 2018-12-13 PROCEDURE — 7100000001 HC PACU RECOVERY - ADDTL 15 MIN: Performed by: SURGERY

## 2018-12-13 PROCEDURE — 2709999900 HC NON-CHARGEABLE SUPPLY: Performed by: SURGERY

## 2018-12-13 PROCEDURE — 7100000010 HC PHASE II RECOVERY - FIRST 15 MIN: Performed by: SURGERY

## 2018-12-13 PROCEDURE — 3700000001 HC ADD 15 MINUTES (ANESTHESIA): Performed by: SURGERY

## 2018-12-13 PROCEDURE — 2500000003 HC RX 250 WO HCPCS: Performed by: SURGERY

## 2018-12-13 PROCEDURE — 49565 PR REPAIR RECURR INCIS HERNIA,REDUC: CPT | Performed by: SURGERY

## 2018-12-13 PROCEDURE — 6360000002 HC RX W HCPCS: Performed by: NURSE ANESTHETIST, CERTIFIED REGISTERED

## 2018-12-13 PROCEDURE — 6360000002 HC RX W HCPCS: Performed by: STUDENT IN AN ORGANIZED HEALTH CARE EDUCATION/TRAINING PROGRAM

## 2018-12-13 PROCEDURE — 6370000000 HC RX 637 (ALT 250 FOR IP): Performed by: SURGERY

## 2018-12-13 PROCEDURE — 2580000003 HC RX 258: Performed by: NURSE PRACTITIONER

## 2018-12-13 PROCEDURE — 6360000002 HC RX W HCPCS: Performed by: SURGERY

## 2018-12-13 PROCEDURE — 7100000011 HC PHASE II RECOVERY - ADDTL 15 MIN: Performed by: SURGERY

## 2018-12-13 PROCEDURE — 3700000000 HC ANESTHESIA ATTENDED CARE: Performed by: SURGERY

## 2018-12-13 PROCEDURE — 2500000003 HC RX 250 WO HCPCS: Performed by: NURSE ANESTHETIST, CERTIFIED REGISTERED

## 2018-12-13 PROCEDURE — 7100000000 HC PACU RECOVERY - FIRST 15 MIN: Performed by: SURGERY

## 2018-12-13 PROCEDURE — 3600000002 HC SURGERY LEVEL 2 BASE: Performed by: SURGERY

## 2018-12-13 PROCEDURE — 2580000003 HC RX 258: Performed by: SURGERY

## 2018-12-13 PROCEDURE — 49568 PR IMPLANT MESH HERNIA REPAIR/DEBRIDEMENT CLOSURE: CPT | Performed by: SURGERY

## 2018-12-13 PROCEDURE — 3600000012 HC SURGERY LEVEL 2 ADDTL 15MIN: Performed by: SURGERY

## 2018-12-13 DEVICE — IMPLANTABLE DEVICE: Type: IMPLANTABLE DEVICE | Site: ABDOMEN | Status: FUNCTIONAL

## 2018-12-13 RX ORDER — SODIUM CHLORIDE, SODIUM LACTATE, POTASSIUM CHLORIDE, CALCIUM CHLORIDE 600; 310; 30; 20 MG/100ML; MG/100ML; MG/100ML; MG/100ML
INJECTION, SOLUTION INTRAVENOUS CONTINUOUS
Status: DISCONTINUED | OUTPATIENT
Start: 2018-12-13 | End: 2018-12-13 | Stop reason: HOSPADM

## 2018-12-13 RX ORDER — LIDOCAINE HYDROCHLORIDE 20 MG/ML
INJECTION, SOLUTION EPIDURAL; INFILTRATION; INTRACAUDAL; PERINEURAL PRN
Status: DISCONTINUED | OUTPATIENT
Start: 2018-12-13 | End: 2018-12-13 | Stop reason: SDUPTHER

## 2018-12-13 RX ORDER — MEPERIDINE HYDROCHLORIDE 25 MG/ML
12.5 INJECTION INTRAMUSCULAR; INTRAVENOUS; SUBCUTANEOUS EVERY 5 MIN PRN
Status: DISCONTINUED | OUTPATIENT
Start: 2018-12-13 | End: 2018-12-13 | Stop reason: HOSPADM

## 2018-12-13 RX ORDER — ACETAMINOPHEN 325 MG/1
650 TABLET ORAL EVERY 4 HOURS PRN
Status: DISCONTINUED | OUTPATIENT
Start: 2018-12-13 | End: 2018-12-13 | Stop reason: HOSPADM

## 2018-12-13 RX ORDER — ONDANSETRON 2 MG/ML
4 INJECTION INTRAMUSCULAR; INTRAVENOUS EVERY 6 HOURS PRN
Status: DISCONTINUED | OUTPATIENT
Start: 2018-12-13 | End: 2018-12-13 | Stop reason: HOSPADM

## 2018-12-13 RX ORDER — MIDAZOLAM HYDROCHLORIDE 1 MG/ML
INJECTION INTRAMUSCULAR; INTRAVENOUS PRN
Status: DISCONTINUED | OUTPATIENT
Start: 2018-12-13 | End: 2018-12-13 | Stop reason: SDUPTHER

## 2018-12-13 RX ORDER — ROCURONIUM BROMIDE 10 MG/ML
INJECTION, SOLUTION INTRAVENOUS PRN
Status: DISCONTINUED | OUTPATIENT
Start: 2018-12-13 | End: 2018-12-13 | Stop reason: SDUPTHER

## 2018-12-13 RX ORDER — PROPOFOL 10 MG/ML
INJECTION, EMULSION INTRAVENOUS PRN
Status: DISCONTINUED | OUTPATIENT
Start: 2018-12-13 | End: 2018-12-13 | Stop reason: SDUPTHER

## 2018-12-13 RX ORDER — SODIUM CHLORIDE 9 MG/ML
INJECTION, SOLUTION INTRAVENOUS CONTINUOUS
Status: DISCONTINUED | OUTPATIENT
Start: 2018-12-13 | End: 2018-12-13 | Stop reason: HOSPADM

## 2018-12-13 RX ORDER — OXYCODONE HYDROCHLORIDE AND ACETAMINOPHEN 5; 325 MG/1; MG/1
1 TABLET ORAL EVERY 4 HOURS PRN
Status: DISCONTINUED | OUTPATIENT
Start: 2018-12-13 | End: 2018-12-13 | Stop reason: HOSPADM

## 2018-12-13 RX ORDER — LIDOCAINE HYDROCHLORIDE 10 MG/ML
1 INJECTION, SOLUTION EPIDURAL; INFILTRATION; INTRACAUDAL; PERINEURAL
Status: DISCONTINUED | OUTPATIENT
Start: 2018-12-13 | End: 2018-12-13 | Stop reason: HOSPADM

## 2018-12-13 RX ORDER — BUPIVACAINE HYDROCHLORIDE AND EPINEPHRINE 5; 5 MG/ML; UG/ML
INJECTION, SOLUTION PERINEURAL PRN
Status: DISCONTINUED | OUTPATIENT
Start: 2018-12-13 | End: 2018-12-13 | Stop reason: HOSPADM

## 2018-12-13 RX ORDER — MAGNESIUM HYDROXIDE 1200 MG/15ML
LIQUID ORAL CONTINUOUS PRN
Status: DISCONTINUED | OUTPATIENT
Start: 2018-12-13 | End: 2018-12-13 | Stop reason: HOSPADM

## 2018-12-13 RX ORDER — ACETAMINOPHEN 650 MG/1
650 SUPPOSITORY RECTAL EVERY 4 HOURS PRN
Status: DISCONTINUED | OUTPATIENT
Start: 2018-12-13 | End: 2018-12-13 | Stop reason: HOSPADM

## 2018-12-13 RX ORDER — FENTANYL CITRATE 50 UG/ML
INJECTION, SOLUTION INTRAMUSCULAR; INTRAVENOUS PRN
Status: DISCONTINUED | OUTPATIENT
Start: 2018-12-13 | End: 2018-12-13 | Stop reason: SDUPTHER

## 2018-12-13 RX ORDER — HYDROCODONE BITARTRATE AND ACETAMINOPHEN 5; 325 MG/1; MG/1
1 TABLET ORAL PRN
Status: DISCONTINUED | OUTPATIENT
Start: 2018-12-13 | End: 2018-12-13 | Stop reason: HOSPADM

## 2018-12-13 RX ORDER — OXYCODONE HYDROCHLORIDE AND ACETAMINOPHEN 5; 325 MG/1; MG/1
1 TABLET ORAL EVERY 6 HOURS PRN
Qty: 28 TABLET | Refills: 0 | Status: SHIPPED | OUTPATIENT
Start: 2018-12-13 | End: 2018-12-20

## 2018-12-13 RX ORDER — SODIUM CHLORIDE 0.9 % (FLUSH) 0.9 %
10 SYRINGE (ML) INJECTION PRN
Status: DISCONTINUED | OUTPATIENT
Start: 2018-12-13 | End: 2018-12-13 | Stop reason: HOSPADM

## 2018-12-13 RX ORDER — HYDROCODONE BITARTRATE AND ACETAMINOPHEN 5; 325 MG/1; MG/1
2 TABLET ORAL PRN
Status: DISCONTINUED | OUTPATIENT
Start: 2018-12-13 | End: 2018-12-13 | Stop reason: HOSPADM

## 2018-12-13 RX ORDER — MORPHINE SULFATE 2 MG/ML
2 INJECTION, SOLUTION INTRAMUSCULAR; INTRAVENOUS
Status: DISCONTINUED | OUTPATIENT
Start: 2018-12-13 | End: 2018-12-13 | Stop reason: HOSPADM

## 2018-12-13 RX ORDER — ONDANSETRON 2 MG/ML
INJECTION INTRAMUSCULAR; INTRAVENOUS PRN
Status: DISCONTINUED | OUTPATIENT
Start: 2018-12-13 | End: 2018-12-13 | Stop reason: SDUPTHER

## 2018-12-13 RX ORDER — OXYCODONE HYDROCHLORIDE AND ACETAMINOPHEN 5; 325 MG/1; MG/1
2 TABLET ORAL EVERY 4 HOURS PRN
Status: DISCONTINUED | OUTPATIENT
Start: 2018-12-13 | End: 2018-12-13 | Stop reason: HOSPADM

## 2018-12-13 RX ORDER — DEXAMETHASONE SODIUM PHOSPHATE 4 MG/ML
INJECTION, SOLUTION INTRA-ARTICULAR; INTRALESIONAL; INTRAMUSCULAR; INTRAVENOUS; SOFT TISSUE PRN
Status: DISCONTINUED | OUTPATIENT
Start: 2018-12-13 | End: 2018-12-13 | Stop reason: SDUPTHER

## 2018-12-13 RX ORDER — METOCLOPRAMIDE HYDROCHLORIDE 5 MG/ML
10 INJECTION INTRAMUSCULAR; INTRAVENOUS
Status: DISCONTINUED | OUTPATIENT
Start: 2018-12-13 | End: 2018-12-13 | Stop reason: HOSPADM

## 2018-12-13 RX ORDER — ONDANSETRON 2 MG/ML
4 INJECTION INTRAMUSCULAR; INTRAVENOUS
Status: COMPLETED | OUTPATIENT
Start: 2018-12-13 | End: 2018-12-13

## 2018-12-13 RX ORDER — ACETAMINOPHEN 325 MG/1
650 TABLET ORAL EVERY 6 HOURS PRN
COMMUNITY

## 2018-12-13 RX ORDER — SODIUM CHLORIDE 0.9 % (FLUSH) 0.9 %
10 SYRINGE (ML) INJECTION EVERY 12 HOURS SCHEDULED
Status: DISCONTINUED | OUTPATIENT
Start: 2018-12-13 | End: 2018-12-13 | Stop reason: HOSPADM

## 2018-12-13 RX ORDER — DIPHENHYDRAMINE HYDROCHLORIDE 50 MG/ML
12.5 INJECTION INTRAMUSCULAR; INTRAVENOUS
Status: DISCONTINUED | OUTPATIENT
Start: 2018-12-13 | End: 2018-12-13 | Stop reason: HOSPADM

## 2018-12-13 RX ORDER — FENTANYL CITRATE 50 UG/ML
50 INJECTION, SOLUTION INTRAMUSCULAR; INTRAVENOUS EVERY 10 MIN PRN
Status: DISCONTINUED | OUTPATIENT
Start: 2018-12-13 | End: 2018-12-13 | Stop reason: HOSPADM

## 2018-12-13 RX ORDER — MORPHINE SULFATE 4 MG/ML
4 INJECTION, SOLUTION INTRAMUSCULAR; INTRAVENOUS
Status: DISCONTINUED | OUTPATIENT
Start: 2018-12-13 | End: 2018-12-13 | Stop reason: HOSPADM

## 2018-12-13 RX ADMIN — ONDANSETRON 4 MG: 2 INJECTION INTRAMUSCULAR; INTRAVENOUS at 09:14

## 2018-12-13 RX ADMIN — DEXAMETHASONE SODIUM PHOSPHATE 4 MG: 4 INJECTION, SOLUTION INTRA-ARTICULAR; INTRALESIONAL; INTRAMUSCULAR; INTRAVENOUS; SOFT TISSUE at 08:50

## 2018-12-13 RX ADMIN — OXYCODONE AND ACETAMINOPHEN 1 TABLET: 5; 325 TABLET ORAL at 11:25

## 2018-12-13 RX ADMIN — FENTANYL CITRATE 50 MCG: 50 INJECTION, SOLUTION INTRAMUSCULAR; INTRAVENOUS at 10:00

## 2018-12-13 RX ADMIN — SUGAMMADEX 183 MG: 100 INJECTION, SOLUTION INTRAVENOUS at 09:24

## 2018-12-13 RX ADMIN — FENTANYL CITRATE 50 MCG: 50 INJECTION, SOLUTION INTRAMUSCULAR; INTRAVENOUS at 10:28

## 2018-12-13 RX ADMIN — PROPOFOL 150 MG: 10 INJECTION, EMULSION INTRAVENOUS at 08:45

## 2018-12-13 RX ADMIN — ONDANSETRON 4 MG: 2 INJECTION INTRAMUSCULAR; INTRAVENOUS at 09:49

## 2018-12-13 RX ADMIN — FENTANYL CITRATE 100 MCG: 50 INJECTION, SOLUTION INTRAMUSCULAR; INTRAVENOUS at 08:45

## 2018-12-13 RX ADMIN — HYDROMORPHONE HYDROCHLORIDE 0.5 MG: 1 INJECTION, SOLUTION INTRAMUSCULAR; INTRAVENOUS; SUBCUTANEOUS at 09:49

## 2018-12-13 RX ADMIN — SODIUM CHLORIDE: 9 INJECTION, SOLUTION INTRAVENOUS at 08:10

## 2018-12-13 RX ADMIN — ROCURONIUM BROMIDE 40 MG: 10 SOLUTION INTRAVENOUS at 08:45

## 2018-12-13 RX ADMIN — LIDOCAINE HYDROCHLORIDE 5 ML: 20 INJECTION, SOLUTION EPIDURAL; INFILTRATION; INTRACAUDAL at 08:45

## 2018-12-13 RX ADMIN — SODIUM CHLORIDE, POTASSIUM CHLORIDE, SODIUM LACTATE AND CALCIUM CHLORIDE: 600; 310; 30; 20 INJECTION, SOLUTION INTRAVENOUS at 08:41

## 2018-12-13 RX ADMIN — FENTANYL CITRATE 100 MCG: 50 INJECTION, SOLUTION INTRAMUSCULAR; INTRAVENOUS at 08:54

## 2018-12-13 RX ADMIN — MIDAZOLAM HYDROCHLORIDE 2 MG: 1 INJECTION, SOLUTION INTRAMUSCULAR; INTRAVENOUS at 08:40

## 2018-12-13 RX ADMIN — FENTANYL CITRATE 50 MCG: 50 INJECTION, SOLUTION INTRAMUSCULAR; INTRAVENOUS at 10:12

## 2018-12-13 RX ADMIN — CEFTRIAXONE SODIUM 1 G: 1 INJECTION, POWDER, FOR SOLUTION INTRAMUSCULAR; INTRAVENOUS at 08:41

## 2018-12-13 ASSESSMENT — PULMONARY FUNCTION TESTS
PIF_VALUE: 20
PIF_VALUE: 20
PIF_VALUE: 18
PIF_VALUE: 19
PIF_VALUE: 1
PIF_VALUE: 15
PIF_VALUE: 20
PIF_VALUE: 20
PIF_VALUE: 1
PIF_VALUE: 20
PIF_VALUE: 9
PIF_VALUE: 18
PIF_VALUE: 19
PIF_VALUE: 1
PIF_VALUE: 19
PIF_VALUE: 15
PIF_VALUE: 19
PIF_VALUE: 19
PIF_VALUE: 0
PIF_VALUE: 20
PIF_VALUE: 16
PIF_VALUE: 1
PIF_VALUE: 15
PIF_VALUE: 18
PIF_VALUE: 19
PIF_VALUE: 15
PIF_VALUE: 2
PIF_VALUE: 1
PIF_VALUE: 1
PIF_VALUE: 18
PIF_VALUE: 9
PIF_VALUE: 15
PIF_VALUE: 17
PIF_VALUE: 19
PIF_VALUE: 20
PIF_VALUE: 18
PIF_VALUE: 15
PIF_VALUE: 19
PIF_VALUE: 20
PIF_VALUE: 16
PIF_VALUE: 1
PIF_VALUE: 15
PIF_VALUE: 17
PIF_VALUE: 15
PIF_VALUE: 20
PIF_VALUE: 20
PIF_VALUE: 21
PIF_VALUE: 20
PIF_VALUE: 18
PIF_VALUE: 20
PIF_VALUE: 20
PIF_VALUE: 1
PIF_VALUE: 17

## 2018-12-13 ASSESSMENT — PAIN SCALES - GENERAL
PAINLEVEL_OUTOF10: 4
PAINLEVEL_OUTOF10: 4
PAINLEVEL_OUTOF10: 7
PAINLEVEL_OUTOF10: 8
PAINLEVEL_OUTOF10: 3
PAINLEVEL_OUTOF10: 7
PAINLEVEL_OUTOF10: 8

## 2018-12-13 ASSESSMENT — PAIN DESCRIPTION - PAIN TYPE: TYPE: SURGICAL PAIN

## 2018-12-13 ASSESSMENT — PAIN - FUNCTIONAL ASSESSMENT: PAIN_FUNCTIONAL_ASSESSMENT: 0-10

## 2018-12-13 NOTE — ANESTHESIA PRE PROCEDURE
Answered      Vital Signs (Current):   Vitals:    12/13/18 0740   BP: 121/64   Pulse: 78   Resp: 16   Temp: 98.2 °F (36.8 °C)   TempSrc: Temporal   SpO2: 100%   Weight: 202 lb (91.6 kg)   Height: 5' 4.5\" (1.638 m)                                              BP Readings from Last 3 Encounters:   12/13/18 121/64   12/06/18 115/68   11/23/18 110/70       NPO Status: Time of last liquid consumption: 2230                        Time of last solid consumption: 2230                        Date of last liquid consumption: 12/12/18                        Date of last solid food consumption: 12/12/18    BMI:   Wt Readings from Last 3 Encounters:   12/13/18 202 lb (91.6 kg)   12/06/18 202 lb 12.8 oz (92 kg)   11/23/18 203 lb (92.1 kg)     Body mass index is 34.14 kg/m². CBC:   Lab Results   Component Value Date    WBC 6.9 12/06/2018    RBC 4.32 12/06/2018    HGB 13.7 12/06/2018    HCT 40.0 12/06/2018    MCV 92.7 12/06/2018    RDW 13.8 12/06/2018     12/06/2018       CMP:   Lab Results   Component Value Date     10/27/2016    K 4.4 10/27/2016     10/27/2016    CO2 24 10/27/2016    BUN 8 10/27/2016    CREATININE 0.54 10/27/2016    GFRAA >60.0 10/27/2016    LABGLOM >60.0 10/27/2016    GLUCOSE 79 11/02/2017    CALCIUM 9.4 10/27/2016       POC Tests: No results for input(s): POCGLU, POCNA, POCK, POCCL, POCBUN, POCHEMO, POCHCT in the last 72 hours.     Coags:   Lab Results   Component Value Date    PROTIME 10.0 10/27/2016    INR 0.9 10/27/2016       HCG (If Applicable):   Lab Results   Component Value Date    PREGTESTUR Negative 04/07/2016        ABGs: No results found for: PHART, PO2ART, SJE4KLA, PVY0IJW, BEART, P2RBPILF     Type & Screen (If Applicable):  No results found for: LABABO, [address]    Anesthesia Evaluation  Patient summary reviewed and Nursing notes reviewed no history of anesthetic complications:   Airway: Mallampati: II  TM distance: >3 FB   Neck ROM: full  Mouth opening: > = 3 FB Dental: normal

## 2018-12-13 NOTE — PROGRESS NOTES
To room 17 accompanied by Irma Dillon RN. Head of bed elevated for comfort. Pain 3/10. Ice to abdomen binder in place. Taking drink and snack well. Family at bedside.

## 2018-12-21 ENCOUNTER — OFFICE VISIT (OUTPATIENT)
Dept: SURGERY | Age: 43
End: 2018-12-21

## 2018-12-21 VITALS
WEIGHT: 199 LBS | BODY MASS INDEX: 33.97 KG/M2 | SYSTOLIC BLOOD PRESSURE: 108 MMHG | DIASTOLIC BLOOD PRESSURE: 60 MMHG | TEMPERATURE: 97.7 F | HEIGHT: 64 IN

## 2018-12-21 DIAGNOSIS — Z09 SURGICAL FOLLOW-UP CARE: Primary | ICD-10-CM

## 2018-12-21 PROCEDURE — 99024 POSTOP FOLLOW-UP VISIT: CPT | Performed by: SURGERY

## 2018-12-21 RX ORDER — TRAMADOL HYDROCHLORIDE 50 MG/1
TABLET ORAL
Refills: 0 | COMMUNITY
Start: 2018-12-13 | End: 2019-09-17 | Stop reason: ALTCHOICE

## 2019-09-17 ENCOUNTER — OFFICE VISIT (OUTPATIENT)
Dept: FAMILY MEDICINE CLINIC | Age: 44
End: 2019-09-17
Payer: COMMERCIAL

## 2019-09-17 VITALS
OXYGEN SATURATION: 99 % | WEIGHT: 235 LBS | HEIGHT: 64 IN | DIASTOLIC BLOOD PRESSURE: 78 MMHG | SYSTOLIC BLOOD PRESSURE: 124 MMHG | BODY MASS INDEX: 40.12 KG/M2 | HEART RATE: 74 BPM | TEMPERATURE: 97.5 F

## 2019-09-17 DIAGNOSIS — M54.42 CHRONIC MIDLINE LOW BACK PAIN WITH LEFT-SIDED SCIATICA: Primary | ICD-10-CM

## 2019-09-17 DIAGNOSIS — G89.29 CHRONIC MIDLINE LOW BACK PAIN WITH LEFT-SIDED SCIATICA: Primary | ICD-10-CM

## 2019-09-17 DIAGNOSIS — M54.12 RADICULOPATHY OF CERVICAL SPINE: ICD-10-CM

## 2019-09-17 DIAGNOSIS — Z98.890 HISTORY OF LUMBAR SURGERY: ICD-10-CM

## 2019-09-17 PROCEDURE — G8427 DOCREV CUR MEDS BY ELIG CLIN: HCPCS | Performed by: NURSE PRACTITIONER

## 2019-09-17 PROCEDURE — G8417 CALC BMI ABV UP PARAM F/U: HCPCS | Performed by: NURSE PRACTITIONER

## 2019-09-17 PROCEDURE — 99214 OFFICE O/P EST MOD 30 MIN: CPT | Performed by: NURSE PRACTITIONER

## 2019-09-17 PROCEDURE — 1036F TOBACCO NON-USER: CPT | Performed by: NURSE PRACTITIONER

## 2019-09-17 RX ORDER — MELOXICAM 15 MG/1
15 TABLET ORAL DAILY
Qty: 30 TABLET | Refills: 0 | Status: SHIPPED | OUTPATIENT
Start: 2019-09-17 | End: 2019-11-26

## 2019-09-17 RX ORDER — CYCLOBENZAPRINE HCL 10 MG
10 TABLET ORAL NIGHTLY PRN
Qty: 30 TABLET | Refills: 1 | Status: SHIPPED | OUTPATIENT
Start: 2019-09-17 | End: 2019-10-21 | Stop reason: SDUPTHER

## 2019-09-17 RX ORDER — HYDROCODONE BITARTRATE AND ACETAMINOPHEN 5; 325 MG/1; MG/1
1 TABLET ORAL EVERY 6 HOURS PRN
Qty: 28 TABLET | Refills: 0 | Status: SHIPPED | OUTPATIENT
Start: 2019-09-17 | End: 2019-09-24

## 2019-09-17 RX ORDER — COVID-19 ANTIGEN TEST
KIT MISCELLANEOUS
Status: ON HOLD | COMMUNITY
End: 2021-04-27 | Stop reason: HOSPADM

## 2019-09-17 RX ORDER — METHYLPREDNISOLONE 4 MG/1
TABLET ORAL
Qty: 1 TABLET | Refills: 0 | Status: SHIPPED | OUTPATIENT
Start: 2019-09-17 | End: 2019-09-23

## 2019-09-17 ASSESSMENT — PATIENT HEALTH QUESTIONNAIRE - PHQ9
SUM OF ALL RESPONSES TO PHQ QUESTIONS 1-9: 2
SUM OF ALL RESPONSES TO PHQ9 QUESTIONS 1 & 2: 2
SUM OF ALL RESPONSES TO PHQ QUESTIONS 1-9: 2
1. LITTLE INTEREST OR PLEASURE IN DOING THINGS: 1
2. FEELING DOWN, DEPRESSED OR HOPELESS: 1

## 2019-09-17 ASSESSMENT — ENCOUNTER SYMPTOMS
BOWEL INCONTINENCE: 0
BACK PAIN: 1

## 2019-09-20 ENCOUNTER — TELEPHONE (OUTPATIENT)
Dept: FAMILY MEDICINE CLINIC | Age: 44
End: 2019-09-20

## 2019-09-20 DIAGNOSIS — M54.50 LUMBAR PAIN: Primary | ICD-10-CM

## 2019-09-20 DIAGNOSIS — Z98.890 PREVIOUS BACK SURGERY: ICD-10-CM

## 2019-09-20 DIAGNOSIS — R29.898 WEAKNESS OF LOWER EXTREMITY, UNSPECIFIED LATERALITY: ICD-10-CM

## 2019-09-20 NOTE — TELEPHONE ENCOUNTER
Pt letting you know that her nose surgeon can't get her in until November. She is asking for you to order her the MRI, that you and her discussed.

## 2019-10-02 ENCOUNTER — HOSPITAL ENCOUNTER (OUTPATIENT)
Dept: MRI IMAGING | Age: 44
Discharge: HOME | End: 2019-10-04
Payer: COMMERCIAL

## 2019-10-02 DIAGNOSIS — M54.50 LUMBAR PAIN: ICD-10-CM

## 2019-10-02 DIAGNOSIS — Z98.890 PREVIOUS BACK SURGERY: ICD-10-CM

## 2019-10-02 DIAGNOSIS — R29.898 WEAKNESS OF LOWER EXTREMITY, UNSPECIFIED LATERALITY: ICD-10-CM

## 2019-10-02 PROCEDURE — 72148 MRI LUMBAR SPINE W/O DYE: CPT

## 2019-10-21 ENCOUNTER — OFFICE VISIT (OUTPATIENT)
Dept: FAMILY MEDICINE CLINIC | Age: 44
End: 2019-10-21
Payer: COMMERCIAL

## 2019-10-21 VITALS
HEART RATE: 78 BPM | BODY MASS INDEX: 40.77 KG/M2 | TEMPERATURE: 96.9 F | WEIGHT: 238.8 LBS | SYSTOLIC BLOOD PRESSURE: 110 MMHG | HEIGHT: 64 IN | DIASTOLIC BLOOD PRESSURE: 70 MMHG | OXYGEN SATURATION: 99 %

## 2019-10-21 DIAGNOSIS — Z13.220 LIPID SCREENING: ICD-10-CM

## 2019-10-21 DIAGNOSIS — Z13.1 DIABETES MELLITUS SCREENING: ICD-10-CM

## 2019-10-21 DIAGNOSIS — G89.29 CHRONIC MIDLINE LOW BACK PAIN WITH LEFT-SIDED SCIATICA: ICD-10-CM

## 2019-10-21 DIAGNOSIS — R09.81 NASAL CONGESTION: ICD-10-CM

## 2019-10-21 DIAGNOSIS — E66.01 CLASS 3 SEVERE OBESITY DUE TO EXCESS CALORIES WITHOUT SERIOUS COMORBIDITY WITH BODY MASS INDEX (BMI) OF 40.0 TO 44.9 IN ADULT (HCC): ICD-10-CM

## 2019-10-21 DIAGNOSIS — Z00.00 WELL ADULT EXAM: Primary | ICD-10-CM

## 2019-10-21 DIAGNOSIS — M54.42 CHRONIC MIDLINE LOW BACK PAIN WITH LEFT-SIDED SCIATICA: ICD-10-CM

## 2019-10-21 LAB
CHOLESTEROL, TOTAL: 180 MG/DL (ref 0–199)
GLUCOSE FASTING: 79 MG/DL (ref 70–99)
HBA1C MFR BLD: 5.3 % (ref 4.8–5.9)
HDLC SERPL-MCNC: 60 MG/DL (ref 40–59)
LDL CHOLESTEROL CALCULATED: 106 MG/DL (ref 0–129)
TRIGL SERPL-MCNC: 69 MG/DL (ref 0–150)

## 2019-10-21 PROCEDURE — 99396 PREV VISIT EST AGE 40-64: CPT | Performed by: NURSE PRACTITIONER

## 2019-10-21 PROCEDURE — G8484 FLU IMMUNIZE NO ADMIN: HCPCS | Performed by: NURSE PRACTITIONER

## 2019-10-21 RX ORDER — PHENTERMINE HYDROCHLORIDE 37.5 MG/1
37.5 TABLET ORAL
Qty: 30 TABLET | Refills: 0 | Status: SHIPPED | OUTPATIENT
Start: 2019-10-21 | End: 2019-11-26 | Stop reason: SDUPTHER

## 2019-10-21 RX ORDER — FLUTICASONE PROPIONATE 50 MCG
1 SPRAY, SUSPENSION (ML) NASAL DAILY
Qty: 1 BOTTLE | Refills: 1 | Status: SHIPPED | OUTPATIENT
Start: 2019-10-21

## 2019-10-21 RX ORDER — CYCLOBENZAPRINE HCL 10 MG
10 TABLET ORAL NIGHTLY PRN
Qty: 30 TABLET | Refills: 2 | Status: SHIPPED | OUTPATIENT
Start: 2019-10-21 | End: 2020-01-19

## 2019-10-21 ASSESSMENT — ENCOUNTER SYMPTOMS
BACK PAIN: 1
SHORTNESS OF BREATH: 0
COUGH: 0

## 2019-11-26 ENCOUNTER — OFFICE VISIT (OUTPATIENT)
Dept: FAMILY MEDICINE CLINIC | Age: 44
End: 2019-11-26
Payer: COMMERCIAL

## 2019-11-26 VITALS
RESPIRATION RATE: 22 BRPM | OXYGEN SATURATION: 97 % | SYSTOLIC BLOOD PRESSURE: 124 MMHG | BODY MASS INDEX: 42.63 KG/M2 | HEIGHT: 63 IN | HEART RATE: 89 BPM | TEMPERATURE: 98.5 F | WEIGHT: 240.6 LBS | DIASTOLIC BLOOD PRESSURE: 76 MMHG

## 2019-11-26 DIAGNOSIS — E66.01 CLASS 3 SEVERE OBESITY DUE TO EXCESS CALORIES WITHOUT SERIOUS COMORBIDITY WITH BODY MASS INDEX (BMI) OF 40.0 TO 44.9 IN ADULT (HCC): ICD-10-CM

## 2019-11-26 DIAGNOSIS — G89.29 CHRONIC MIDLINE LOW BACK PAIN WITH LEFT-SIDED SCIATICA: Primary | ICD-10-CM

## 2019-11-26 DIAGNOSIS — Z23 NEED FOR INFLUENZA VACCINATION: ICD-10-CM

## 2019-11-26 DIAGNOSIS — M54.42 CHRONIC MIDLINE LOW BACK PAIN WITH LEFT-SIDED SCIATICA: Primary | ICD-10-CM

## 2019-11-26 PROCEDURE — G8482 FLU IMMUNIZE ORDER/ADMIN: HCPCS | Performed by: NURSE PRACTITIONER

## 2019-11-26 PROCEDURE — G8427 DOCREV CUR MEDS BY ELIG CLIN: HCPCS | Performed by: NURSE PRACTITIONER

## 2019-11-26 PROCEDURE — 99213 OFFICE O/P EST LOW 20 MIN: CPT | Performed by: NURSE PRACTITIONER

## 2019-11-26 PROCEDURE — G8417 CALC BMI ABV UP PARAM F/U: HCPCS | Performed by: NURSE PRACTITIONER

## 2019-11-26 PROCEDURE — 90471 IMMUNIZATION ADMIN: CPT | Performed by: NURSE PRACTITIONER

## 2019-11-26 PROCEDURE — 90688 IIV4 VACCINE SPLT 0.5 ML IM: CPT | Performed by: NURSE PRACTITIONER

## 2019-11-26 PROCEDURE — 1036F TOBACCO NON-USER: CPT | Performed by: NURSE PRACTITIONER

## 2019-11-26 RX ORDER — PHENTERMINE HYDROCHLORIDE 37.5 MG/1
37.5 TABLET ORAL
Qty: 30 TABLET | Refills: 0 | Status: SHIPPED | OUTPATIENT
Start: 2019-11-26 | End: 2019-12-26

## 2019-11-26 RX ORDER — HYDROCODONE BITARTRATE AND ACETAMINOPHEN 5; 325 MG/1; MG/1
1 TABLET ORAL EVERY 6 HOURS PRN
Qty: 28 TABLET | Refills: 0 | Status: SHIPPED | OUTPATIENT
Start: 2019-11-26 | End: 2019-12-03

## 2019-11-26 RX ORDER — PREDNISONE 10 MG/1
10 TABLET ORAL DAILY
Qty: 18 TABLET | Refills: 0 | Status: CANCELLED | OUTPATIENT
Start: 2019-11-26

## 2019-11-26 ASSESSMENT — ENCOUNTER SYMPTOMS
COUGH: 0
SHORTNESS OF BREATH: 0
DIARRHEA: 0
CONSTIPATION: 0

## 2020-01-08 ENCOUNTER — OFFICE VISIT (OUTPATIENT)
Dept: FAMILY MEDICINE CLINIC | Age: 45
End: 2020-01-08
Payer: COMMERCIAL

## 2020-01-08 VITALS
WEIGHT: 244.2 LBS | OXYGEN SATURATION: 100 % | HEART RATE: 82 BPM | HEIGHT: 64 IN | BODY MASS INDEX: 41.69 KG/M2 | SYSTOLIC BLOOD PRESSURE: 118 MMHG | TEMPERATURE: 97 F | DIASTOLIC BLOOD PRESSURE: 82 MMHG

## 2020-01-08 PROCEDURE — 99213 OFFICE O/P EST LOW 20 MIN: CPT | Performed by: NURSE PRACTITIONER

## 2020-01-08 PROCEDURE — G8427 DOCREV CUR MEDS BY ELIG CLIN: HCPCS | Performed by: NURSE PRACTITIONER

## 2020-01-08 PROCEDURE — G8417 CALC BMI ABV UP PARAM F/U: HCPCS | Performed by: NURSE PRACTITIONER

## 2020-01-08 PROCEDURE — G8482 FLU IMMUNIZE ORDER/ADMIN: HCPCS | Performed by: NURSE PRACTITIONER

## 2020-01-08 PROCEDURE — 1036F TOBACCO NON-USER: CPT | Performed by: NURSE PRACTITIONER

## 2020-01-08 RX ORDER — PHENTERMINE HYDROCHLORIDE 37.5 MG/1
37.5 TABLET ORAL
Qty: 30 TABLET | Refills: 0 | Status: SHIPPED | OUTPATIENT
Start: 2020-01-08 | End: 2020-02-07

## 2020-01-08 SDOH — ECONOMIC STABILITY: TRANSPORTATION INSECURITY
IN THE PAST 12 MONTHS, HAS LACK OF TRANSPORTATION KEPT YOU FROM MEETINGS, WORK, OR FROM GETTING THINGS NEEDED FOR DAILY LIVING?: NO

## 2020-01-08 SDOH — ECONOMIC STABILITY: FOOD INSECURITY: WITHIN THE PAST 12 MONTHS, YOU WORRIED THAT YOUR FOOD WOULD RUN OUT BEFORE YOU GOT MONEY TO BUY MORE.: NEVER TRUE

## 2020-01-08 SDOH — ECONOMIC STABILITY: TRANSPORTATION INSECURITY
IN THE PAST 12 MONTHS, HAS THE LACK OF TRANSPORTATION KEPT YOU FROM MEDICAL APPOINTMENTS OR FROM GETTING MEDICATIONS?: NO

## 2020-01-08 SDOH — ECONOMIC STABILITY: INCOME INSECURITY: HOW HARD IS IT FOR YOU TO PAY FOR THE VERY BASICS LIKE FOOD, HOUSING, MEDICAL CARE, AND HEATING?: NOT HARD AT ALL

## 2020-01-08 SDOH — ECONOMIC STABILITY: FOOD INSECURITY: WITHIN THE PAST 12 MONTHS, THE FOOD YOU BOUGHT JUST DIDN'T LAST AND YOU DIDN'T HAVE MONEY TO GET MORE.: NEVER TRUE

## 2020-01-08 ASSESSMENT — ENCOUNTER SYMPTOMS
SHORTNESS OF BREATH: 0
COUGH: 0
DIARRHEA: 0
CONSTIPATION: 0

## 2020-01-08 NOTE — PROGRESS NOTES
Repair recurrent VH with mesh    HYSTERECTOMY, TOTAL ABDOMINAL  04/07/16    DR RAYA    SPINE SURGERY  2001, 2005, 2006    lumbar    TUBAL LIGATION      UMBILICAL HERNIA REPAIR  10/20/15    Hannah Sierra MD    VENTRAL HERNIA REPAIR N/A 12/13/2018    With mesh     Family History   Problem Relation Age of Onset   [facility] Cancer Mother 47        breast    Hypertension Mother     High Blood Pressure Mother     Other Father         car accident    Cancer Maternal Grandmother         colon    Diabetes Paternal Grandfather      Social History     Socioeconomic History    Marital status:      Spouse name: None    Number of children: None    Years of education: None    Highest education level: None   Occupational History    None   Social Needs    Financial resource strain: Not hard at all   Criterion Security insecurity:     Worry: Never true     Inability: Never true    Transportation needs:     Medical: No     Non-medical: No   Tobacco Use    Smoking status: Former Smoker     Packs/day: 0.50     Years: 10.00     Pack years: 5.00     Types: Cigarettes     Last attempt to quit: 11/14/2004     Years since quitting: 15.1    Smokeless tobacco: Never Used   Substance and Sexual Activity    Alcohol use: Yes     Comment: socially    Drug use: No    Sexual activity: Yes   Lifestyle    Physical activity:     Days per week: None     Minutes per session: None    Stress: None   Relationships    Social connections:     Talks on phone: None     Gets together: None     Attends Religion service: None     Active member of club or organization: None     Attends meetings of clubs or organizations: None     Relationship status: None    Intimate partner violence:     Fear of current or ex partner: None     Emotionally abused: None     Physically abused: None     Forced sexual activity: None   Other Topics Concern    None   Social History Narrative    None     Current Outpatient Medications on File Prior to Visit   Medication Sig Dispense Refill    gabapentin (NEURONTIN) 100 MG capsule Take 1 capsule by mouth 2 times daily for 180 days. Start QHS x 7 days then increase to BID as tolerated. Intended supply: 30 days 60 capsule 0    cyclobenzaprine (FLEXERIL) 10 MG tablet Take 1 tablet by mouth nightly as needed for Muscle spasms 30 tablet 2    fluticasone (FLONASE) 50 MCG/ACT nasal spray 1 spray by Each Nostril route daily 1 Bottle 1    Naproxen Sodium (ALEVE) 220 MG CAPS Take by mouth      acetaminophen (TYLENOL) 325 MG tablet Take 650 mg by mouth every 6 hours as needed for Pain      cetirizine (ZYRTEC) 10 MG tablet Take 10 mg by mouth daily as needed       Biotin 1000 MCG TABS Take  by mouth. No current facility-administered medications on file prior to visit. Allergies   Allergen Reactions    Adhesive Tape Rash    Macrobid [Nitrofurantoin Monohydrate Macrocrystals] Rash    Sulfa Antibiotics Rash       Review of Systems   Constitutional: Negative for fatigue. Respiratory: Negative for cough and shortness of breath. Cardiovascular: Negative for chest pain. Gastrointestinal: Negative for constipation and diarrhea. Objective  Vitals:    01/08/20 1827   BP: 118/82   Pulse: 82   Temp: 97 °F (36.1 °C)   SpO2: 100%   Weight: 244 lb 3.2 oz (110.8 kg)   Height: 5' 4\" (1.626 m)     Physical Exam  Vitals signs and nursing note reviewed. Constitutional:       Appearance: Normal appearance. She is normal weight. HENT:      Head: Normocephalic. Cardiovascular:      Rate and Rhythm: Normal rate and regular rhythm. Pulses: Normal pulses. Heart sounds: Normal heart sounds. Skin:     General: Skin is warm. Neurological:      General: No focal deficit present. Mental Status: She is alert and oriented to person, place, and time. Mental status is at baseline. Psychiatric:         Mood and Affect: Mood normal.         Behavior: Behavior normal.         Thought Content:  Thought content normal.

## 2020-10-08 ENCOUNTER — OFFICE VISIT (OUTPATIENT)
Dept: FAMILY MEDICINE CLINIC | Age: 45
End: 2020-10-08
Payer: COMMERCIAL

## 2020-10-08 VITALS
BODY MASS INDEX: 46.03 KG/M2 | DIASTOLIC BLOOD PRESSURE: 74 MMHG | WEIGHT: 269.6 LBS | SYSTOLIC BLOOD PRESSURE: 112 MMHG | OXYGEN SATURATION: 98 % | HEIGHT: 64 IN | HEART RATE: 90 BPM | TEMPERATURE: 96.9 F

## 2020-10-08 DIAGNOSIS — Z13.1 DIABETES MELLITUS SCREENING: ICD-10-CM

## 2020-10-08 DIAGNOSIS — Z13.220 LIPID SCREENING: ICD-10-CM

## 2020-10-08 LAB
CHOLESTEROL, TOTAL: 166 MG/DL (ref 0–199)
GLUCOSE FASTING: 88 MG/DL (ref 70–99)
HDLC SERPL-MCNC: 57 MG/DL (ref 40–59)
LDL CHOLESTEROL CALCULATED: 91 MG/DL (ref 0–129)
TRIGL SERPL-MCNC: 92 MG/DL (ref 0–150)

## 2020-10-08 PROCEDURE — 99396 PREV VISIT EST AGE 40-64: CPT | Performed by: NURSE PRACTITIONER

## 2020-10-08 PROCEDURE — 90688 IIV4 VACCINE SPLT 0.5 ML IM: CPT | Performed by: NURSE PRACTITIONER

## 2020-10-08 PROCEDURE — G8482 FLU IMMUNIZE ORDER/ADMIN: HCPCS | Performed by: NURSE PRACTITIONER

## 2020-10-08 PROCEDURE — 90471 IMMUNIZATION ADMIN: CPT | Performed by: NURSE PRACTITIONER

## 2020-10-08 ASSESSMENT — ENCOUNTER SYMPTOMS
BACK PAIN: 1
RESPIRATORY NEGATIVE: 1

## 2020-10-08 NOTE — PROGRESS NOTES
HERNIA REPAIR  12/2018    Repair recurrent VH with mesh    HYSTERECTOMY, TOTAL ABDOMINAL  04/07/16    DR RAYA    SPINE SURGERY  2001, 2005, 2006    lumbar    TUBAL LIGATION      UMBILICAL HERNIA REPAIR  10/20/15    Rafi Rico MD    VENTRAL HERNIA REPAIR N/A 12/13/2018    With mesh     Family History   Problem Relation Age of Onset   Greg Wade Cancer Mother 47        breast    Hypertension Mother     High Blood Pressure Mother     Other Father         car accident    Cancer Maternal Grandmother         colon    Diabetes Paternal Grandfather      Social History     Socioeconomic History    Marital status:      Spouse name: Not on file    Number of children: Not on file    Years of education: Not on file    Highest education level: Not on file   Occupational History    Not on file   Social Needs    Financial resource strain: Not hard at all   [employer] insecurity     Worry: Never true     Inability: Never true   [employer] needs     Medical: No     Non-medical: No   Tobacco Use    Smoking status: Former Smoker     Packs/day: 0.50     Years: 10.00     Pack years: 5.00     Types: Cigarettes     Last attempt to quit: 11/14/2004     Years since quitting: 15.9    Smokeless tobacco: Never Used   Substance and Sexual Activity    Alcohol use: Yes     Comment: socially    Drug use: No    Sexual activity: Yes   Lifestyle    Physical activity     Days per week: Not on file     Minutes per session: Not on file    Stress: Not on file   Relationships    Social connections     Talks on phone: Not on file     Gets together: Not on file     Attends Caodaism service: Not on file     Active member of club or organization: Not on file     Attends meetings of clubs or organizations: Not on file     Relationship status: Not on file    Intimate partner violence     Fear of current or ex partner: Not on file     Emotionally abused: Not on file     Physically abused: Not on file     Forced sexual activity: Not on file   Other Topics Concern    Not on file   Social History Narrative    Not on file     Current Outpatient Medications on File Prior to Visit   Medication Sig Dispense Refill    gabapentin (NEURONTIN) 300 MG capsule Take 1 capsule by mouth 2 times daily for 60 days. Intended supply: 30 days 60 capsule 1    DULoxetine (CYMBALTA) 60 MG extended release capsule Take 1 capsule by mouth daily 30 capsule 1    fluticasone (FLONASE) 50 MCG/ACT nasal spray 1 spray by Each Nostril route daily 1 Bottle 1    Naproxen Sodium (ALEVE) 220 MG CAPS Take by mouth      acetaminophen (TYLENOL) 325 MG tablet Take 650 mg by mouth every 6 hours as needed for Pain      cetirizine (ZYRTEC) 10 MG tablet Take 10 mg by mouth daily as needed       Biotin 1000 MCG TABS Take  by mouth. No current facility-administered medications on file prior to visit. Allergies   Allergen Reactions    Adhesive Tape Rash    Macrobid [Nitrofurantoin Monohydrate Macrocrystals] Rash    Sulfa Antibiotics Rash       Review of Systems   Constitutional: Positive for activity change. Respiratory: Negative. Cardiovascular: Negative. Musculoskeletal: Positive for back pain. Objective  Vitals:    10/08/20 0809   BP: 112/74   Site: Right Lower Arm   Position: Sitting   Pulse: 90   Temp: 96.9 °F (36.1 °C)   SpO2: 98%   Weight: 269 lb 9.6 oz (122.3 kg)   Height: 5' 4\" (1.626 m)     Physical Exam  Vitals signs and nursing note reviewed. Constitutional:       General: She is awake. Appearance: Normal appearance. She is well-groomed. She is obese. Cardiovascular:      Rate and Rhythm: Normal rate and regular rhythm. Heart sounds: Normal heart sounds. Pulmonary:      Effort: Pulmonary effort is normal.      Breath sounds: Normal breath sounds. Skin:     General: Skin is warm and dry. Neurological:      General: No focal deficit present. Mental Status: She is alert and oriented to person, place, and time. Psychiatric:         Attention and Perception: Attention and perception normal.         Mood and Affect: Mood normal.         Speech: Speech normal.         Behavior: Behavior is cooperative. Assessment & Plan     Diagnosis Orders   1. Well adult exam     2. Need for influenza vaccination  INFLUENZA, QUADV, 3 YRS AND OLDER, IM, MDV, 0.5ML (Odean Wilson)   3. Lipid screening  Lipid Panel   4. Diabetes mellitus screening  Glucose, Fasting   5. Class 3 severe obesity due to excess calories without serious comorbidity with body mass index (BMI) of 45.0 to 49.9 in adult (HCC)  naltrexone-buPROPion (CONTRAVE) 8-90 MG per extended release tablet   6. Encounter for screening mammogram for malignant neoplasm of breast  GHAZALA DIGITAL SCREEN W OR WO CAD BILATERAL       Orders Placed This Encounter   Procedures    GHAZALA DIGITAL SCREEN W OR WO CAD BILATERAL     Standing Status:   Future     Standing Expiration Date:   12/8/2021     Order Specific Question:   Reason for exam:     Answer:   breast cancer screening    INFLUENZA, QUADV, 3 YRS AND OLDER, IM, MDV, 0.5ML (AFLURIA QUADV)    Lipid Panel     Standing Status:   Future     Standing Expiration Date:   10/8/2021     Order Specific Question:   Is Patient Fasting?/# of Hours     Answer:   12    Glucose, Fasting     Standing Status:   Future     Standing Expiration Date:   10/8/2021       Orders Placed This Encounter   Medications    naltrexone-buPROPion (CONTRAVE) 8-90 MG per extended release tablet     Sig: Take 2 tablets by mouth 2 times daily     Dispense:  120 tablet     Refill:  2     Side effects, adverse effects of the medication prescribed today, as well as treatment plan/ rationale and result expectations have been discussed with the patient who expresses understanding and desires to proceed. Close follow up to evaluate treatment results and for coordination of care.   I have reviewed the patient's medical history in detail and updated the computerized patient record. As always, patient is advised that if symptoms worsen in any way they will proceed to the nearest emergency room. Return in about 4 weeks (around 11/5/2020).     MAURICIO Telles - CNP

## 2020-10-08 NOTE — PROGRESS NOTES
After obtaining consent, and per orders of Dr. Liza Machado, injection of influenza given in Left deltoid by [facility]. Patient instructed to remain in clinic for 20 minutes afterwards, and to report any adverse reaction to me immediately.

## 2020-10-08 NOTE — PROGRESS NOTES
Vaccine Information Sheet, \"Influenza - Inactivated\"  given to Niko Funes, or parent/legal guardian of  Niko Funes and verbalized understanding. Patient responses:    Have you ever had a reaction to a flu vaccine? No  Are you able to eat eggs without adverse effects? Yes  Do you have any current illness? No  Have you ever had [place] Syndrome? No    Flu vaccine given per order. Please see immunization tab.

## 2020-10-14 ENCOUNTER — HOSPITAL ENCOUNTER (OUTPATIENT)
Dept: WOMENS IMAGING | Age: 45
Discharge: HOME | End: 2020-10-16
Payer: COMMERCIAL

## 2020-10-14 PROCEDURE — 77067 SCR MAMMO BI INCL CAD: CPT

## 2020-10-14 PROCEDURE — 77063 BREAST TOMOSYNTHESIS BI: CPT

## 2020-10-28 ENCOUNTER — HOSPITAL ENCOUNTER (OUTPATIENT)
Dept: GENERAL RADIOLOGY | Age: 45
Discharge: HOME | End: 2020-10-30
Payer: COMMERCIAL

## 2020-10-28 ENCOUNTER — HOSPITAL ENCOUNTER (OUTPATIENT)
Age: 45
Discharge: HOME | End: 2020-10-30
Payer: COMMERCIAL

## 2020-10-28 PROCEDURE — 72050 X-RAY EXAM NECK SPINE 4/5VWS: CPT

## 2020-10-28 PROCEDURE — 72110 X-RAY EXAM L-2 SPINE 4/>VWS: CPT

## 2020-10-28 PROCEDURE — 73521 X-RAY EXAM HIPS BI 2 VIEWS: CPT

## 2020-11-10 ENCOUNTER — OFFICE VISIT (OUTPATIENT)
Dept: FAMILY MEDICINE CLINIC | Age: 45
End: 2020-11-10
Payer: COMMERCIAL

## 2020-11-10 VITALS
HEART RATE: 74 BPM | DIASTOLIC BLOOD PRESSURE: 84 MMHG | OXYGEN SATURATION: 98 % | TEMPERATURE: 97.3 F | SYSTOLIC BLOOD PRESSURE: 122 MMHG | HEIGHT: 64 IN | WEIGHT: 272.8 LBS | BODY MASS INDEX: 46.57 KG/M2

## 2020-11-10 PROCEDURE — 1036F TOBACCO NON-USER: CPT | Performed by: NURSE PRACTITIONER

## 2020-11-10 PROCEDURE — G8427 DOCREV CUR MEDS BY ELIG CLIN: HCPCS | Performed by: NURSE PRACTITIONER

## 2020-11-10 PROCEDURE — G8482 FLU IMMUNIZE ORDER/ADMIN: HCPCS | Performed by: NURSE PRACTITIONER

## 2020-11-10 PROCEDURE — G8417 CALC BMI ABV UP PARAM F/U: HCPCS | Performed by: NURSE PRACTITIONER

## 2020-11-10 PROCEDURE — 99213 OFFICE O/P EST LOW 20 MIN: CPT | Performed by: NURSE PRACTITIONER

## 2020-11-10 ASSESSMENT — ENCOUNTER SYMPTOMS
SHORTNESS OF BREATH: 0
ABDOMINAL PAIN: 1
COUGH: 0

## 2020-11-10 NOTE — PROGRESS NOTES
Subjective  Chief Complaint   Patient presents with    Weight Loss     Pt states that her insurance is not covering contrave, states that she has not started this medication yet. HPI     Questioning hernia repair issues. States that she is having abdominal pain that is similar to previous pain when she had a hernia issue previously. States that it is very uncomfortable and would like it looked at.     Patient Active Problem List    Diagnosis Date Noted    Recurrent ventral hernia 11/23/2018     Priority: High    Obesity 08/29/2017    Disorder of intervertebral disc at C5-C6 level with radiculopathy 11/22/2016    Cervical disc disorder at C5-C6 level with radiculopathy 10/27/2016    IBS (irritable bowel syndrome) 10/27/2016    Polycystic ovary syndrome 10/27/2016    Asthma 10/27/2016    Depression with anxiety 10/27/2016    Sinus congestion 10/27/2016    Black-out (not amnesia) 10/27/2016    Herniated cervical disc 10/14/2016    Cervical radiculopathy at C6 10/14/2016    Anemia 10/28/2013     Past Medical History:   Diagnosis Date    Anxiety and depression     Asthma     childhood only    Chronic back pain     Depression     Gastrointestinal problem     Irritable bowel syndrome     Mental disorder     PCOS (polycystic ovarian syndrome)     Urinary incontinence     stress     Past Surgical History:   Procedure Laterality Date    BACK SURGERY      lumbar microdiscectomy x 3     BLADDER SUSPENSION  2013    BREAST BIOPSY Bilateral 1/16/13    U/S Guided core bx of 2 solid nodules int he right and left breast    CERVICAL FUSION N/A 10/28/2016    [facility]DF ANTERIOR CERVICAL DISKECTOMY FUSION C5-6 USING CC TRIAD HELIX MINI-PLATE, 1.5 HRS, NUVASIVE REP  performed by Asia Amaya MD at [address] [place] [address]      2    CHOLECYSTECTOMY      COLONOSCOPY      ELBOW SURGERY      pins in and out, pt broke tip of elbow    ENDOMETRIAL ABLATION  2014    ENDOSCOPY, COLON, DIAGNOSTIC      FRACTURE SURGERY      fx / left elbow / pins in & then removed   [address] HERNIA REPAIR  2018    Repair recurrent VH with mesh    HYSTERECTOMY, TOTAL ABDOMINAL  16     [address] [facility] , [facility]    lumbar    TUBAL LIGATION      UMBILICAL HERNIA REPAIR  10/20/15    Venkat Laguerre MD    VENTRAL HERNIA REPAIR N/A 2018    With mesh     Family History   Problem Relation Age of Onset   Jose A Catherine Cancer Mother 47        breast    Hypertension Mother     High Blood Pressure Mother     Other Father         car accident   Jose A Catherine Cancer Maternal Grandmother         colon    Diabetes Paternal Grandfather      Social History     Socioeconomic History    Marital status:      Spouse name: None    Number of children: None    Years of education: None    Highest education level: None   Occupational History    None   Social Needs    Financial resource strain: Not hard at all   [place] insecurity     Worry: Never true     Inability: Never true    Transportation needs     Medical: No     Non-medical: No   Tobacco Use    Smoking status: Former Smoker     Packs/day: 0.50     Years: 10.00     Pack years: 5.00     Types: Cigarettes     Last attempt to quit: 2004     Years since quittin.0    Smokeless tobacco: Never Used   Substance and Sexual Activity    Alcohol use: Yes     Comment: socially    Drug use: No    Sexual activity: Yes   Lifestyle    Physical activity     Days per week: None     Minutes per session: None    Stress: None   Relationships    Social connections     Talks on phone: None     Gets together: None     Attends Adventism service: None     Active member of club or organization: None     Attends meetings of clubs or organizations: None     Relationship status: None    Intimate partner violence     Fear of current or ex partner: None     Emotionally abused: None     Physically abused: None     Forced sexual activity: None   Other Topics Concern    light. Cardiovascular:      Rate and Rhythm: Normal rate and regular rhythm. Pulses: Normal pulses. Heart sounds: Normal heart sounds. Pulmonary:      Effort: Pulmonary effort is normal.      Breath sounds: Normal breath sounds. Abdominal:      Palpations: Abdomen is soft. Tenderness: There is abdominal tenderness (pain and bulging in middle of abdomen). Hernia: A hernia is present. Skin:     General: Skin is warm. Neurological:      General: No focal deficit present. Mental Status: She is alert and oriented to person, place, and time. Mental status is at baseline. Psychiatric:         Mood and Affect: Mood normal.         Behavior: Behavior normal.         Thought Content: Thought content normal.         Judgment: Judgment normal.         Assessment & Plan     Diagnosis Orders   1. Generalized abdominal pain  CT ABDOMEN PELVIS W IV CONTRAST Additional Contrast? Radiologist Recommendation   2. History of abdominal surgery  CT ABDOMEN PELVIS W IV CONTRAST Additional Contrast? Radiologist Recommendation       Orders Placed This Encounter   Procedures    CT ABDOMEN PELVIS W IV CONTRAST Additional Contrast? Radiologist Recommendation     Standing Status:   Future     Standing Expiration Date:   11/10/2021     Order Specific Question:   Additional Contrast?     Answer:   Radiologist Recommendation     Order Specific Question:   Reason for exam:     Answer:   abdominal pain, h/o multple abdominal surgeries       No orders of the defined types were placed in this encounter. Side effects, adverse effects of the medication prescribed today, as well as treatment plan/ rationale and result expectations have been discussed with the patient who expresses understanding and desires to proceed. Close follow up to evaluate treatment results and for coordination of care. I have reviewed the patient's medical history in detail and updated the computerized patient record.     As always, patient is advised that if symptoms worsen in any way they will proceed to the nearest emergency room. Fu after imaging.     Bienvenido Barfield, APRN - CNP

## 2020-11-16 ENCOUNTER — HOSPITAL ENCOUNTER (OUTPATIENT)
Dept: CT IMAGING | Age: 45
Discharge: HOME | End: 2020-11-18
Payer: COMMERCIAL

## 2020-11-16 VITALS — HEIGHT: 64 IN | BODY MASS INDEX: 45.24 KG/M2 | WEIGHT: 265 LBS

## 2020-11-16 PROCEDURE — 2500000003 HC RX 250 WO HCPCS: Performed by: NURSE PRACTITIONER

## 2020-11-16 PROCEDURE — 74177 CT ABD & PELVIS W/CONTRAST: CPT

## 2020-11-16 PROCEDURE — 6360000004 HC RX CONTRAST MEDICATION: Performed by: NURSE PRACTITIONER

## 2020-11-16 RX ADMIN — IOPAMIDOL 100 ML: 612 INJECTION, SOLUTION INTRAVENOUS at 15:35

## 2020-11-16 RX ADMIN — BARIUM SULFATE 450 ML: 20 SUSPENSION ORAL at 15:28

## 2020-12-07 ENCOUNTER — HOSPITAL ENCOUNTER (OUTPATIENT)
Dept: MRI IMAGING | Age: 45
Discharge: HOME | End: 2020-12-09
Payer: COMMERCIAL

## 2020-12-07 PROCEDURE — 72148 MRI LUMBAR SPINE W/O DYE: CPT

## 2020-12-14 ENCOUNTER — OFFICE VISIT (OUTPATIENT)
Dept: FAMILY MEDICINE CLINIC | Age: 45
End: 2020-12-14
Payer: COMMERCIAL

## 2020-12-14 VITALS
DIASTOLIC BLOOD PRESSURE: 78 MMHG | TEMPERATURE: 97 F | OXYGEN SATURATION: 98 % | SYSTOLIC BLOOD PRESSURE: 124 MMHG | BODY MASS INDEX: 44.8 KG/M2 | HEIGHT: 64 IN | WEIGHT: 262.4 LBS | HEART RATE: 88 BPM

## 2020-12-14 PROCEDURE — 99213 OFFICE O/P EST LOW 20 MIN: CPT | Performed by: NURSE PRACTITIONER

## 2020-12-14 PROCEDURE — 1036F TOBACCO NON-USER: CPT | Performed by: NURSE PRACTITIONER

## 2020-12-14 PROCEDURE — G8427 DOCREV CUR MEDS BY ELIG CLIN: HCPCS | Performed by: NURSE PRACTITIONER

## 2020-12-14 PROCEDURE — G8482 FLU IMMUNIZE ORDER/ADMIN: HCPCS | Performed by: NURSE PRACTITIONER

## 2020-12-14 PROCEDURE — G8417 CALC BMI ABV UP PARAM F/U: HCPCS | Performed by: NURSE PRACTITIONER

## 2020-12-14 ASSESSMENT — ENCOUNTER SYMPTOMS
BACK PAIN: 1
COUGH: 0
SHORTNESS OF BREATH: 0

## 2020-12-14 NOTE — PROGRESS NOTES
Forced sexual activity: None   Other Topics Concern    None   Social History Narrative    None     Current Outpatient Medications on File Prior to Visit   Medication Sig Dispense Refill    traMADol (ULTRAM) 50 MG tablet Take 1 tablet by mouth 2 times daily for 20 days. 40 tablet 0    gabapentin (NEURONTIN) 400 MG capsule Take 1 cap BID and 2 caps QHS for 30 days 120 capsule 0    naltrexone-buPROPion (CONTRAVE) 8-90 MG per extended release tablet Take 2 tablets by mouth 2 times daily 120 tablet 2    fluticasone (FLONASE) 50 MCG/ACT nasal spray 1 spray by Each Nostril route daily 1 Bottle 1    Naproxen Sodium (ALEVE) 220 MG CAPS Take by mouth      acetaminophen (TYLENOL) 325 MG tablet Take 650 mg by mouth every 6 hours as needed for Pain      cetirizine (ZYRTEC) 10 MG tablet Take 10 mg by mouth daily as needed       DULoxetine (CYMBALTA) 60 MG extended release capsule Take 1 capsule by mouth daily (Patient not taking: Reported on 12/14/2020) 30 capsule 0     No current facility-administered medications on file prior to visit. Allergies   Allergen Reactions    Adhesive Tape Rash    Macrobid [Nitrofurantoin Monohydrate Macrocrystals] Rash    Sulfa Antibiotics Rash       Review of Systems   Constitutional: Positive for fatigue. Respiratory: Negative for cough and shortness of breath. Cardiovascular: Negative for chest pain. Musculoskeletal: Positive for back pain and gait problem. Objective  Vitals:    12/14/20 0839   BP: 124/78   Site: Right Upper Arm   Position: Sitting   Cuff Size: Large Adult   Pulse: 88   Temp: 97 °F (36.1 °C)   SpO2: 98%   Weight: 262 lb 6.4 oz (119 kg)   Height: 5' 4\" (1.626 m)     Physical Exam  Vitals signs and nursing note reviewed. Constitutional:       Appearance: Normal appearance. She is normal weight. HENT:      Head: Normocephalic. Eyes:      Extraocular Movements: Extraocular movements intact.       Conjunctiva/sclera: Conjunctivae normal.

## 2020-12-16 ENCOUNTER — HOSPITAL ENCOUNTER (OUTPATIENT)
Dept: ULTRASOUND IMAGING | Age: 45
Discharge: HOME | End: 2020-12-18
Payer: COMMERCIAL

## 2020-12-16 PROCEDURE — 76856 US EXAM PELVIC COMPLETE: CPT

## 2020-12-16 PROCEDURE — 76830 TRANSVAGINAL US NON-OB: CPT

## 2021-02-10 DIAGNOSIS — E66.01 CLASS 3 SEVERE OBESITY DUE TO EXCESS CALORIES WITHOUT SERIOUS COMORBIDITY WITH BODY MASS INDEX (BMI) OF 45.0 TO 49.9 IN ADULT (HCC): ICD-10-CM

## 2021-03-04 ENCOUNTER — NURSE ONLY (OUTPATIENT)
Dept: PRIMARY CARE CLINIC | Age: 46
End: 2021-03-04

## 2021-03-04 ENCOUNTER — HOSPITAL ENCOUNTER (OUTPATIENT)
Dept: PREADMISSION TESTING | Age: 46
Discharge: HOME | End: 2021-03-08
Payer: COMMERCIAL

## 2021-03-04 VITALS
SYSTOLIC BLOOD PRESSURE: 133 MMHG | OXYGEN SATURATION: 98 % | HEIGHT: 64 IN | BODY MASS INDEX: 43.87 KG/M2 | TEMPERATURE: 97.2 F | HEART RATE: 67 BPM | DIASTOLIC BLOOD PRESSURE: 71 MMHG | RESPIRATION RATE: 16 BRPM | WEIGHT: 257 LBS

## 2021-03-04 DIAGNOSIS — M54.50 LOW BACK PAIN, UNSPECIFIED BACK PAIN LATERALITY, UNSPECIFIED CHRONICITY, UNSPECIFIED WHETHER SCIATICA PRESENT: ICD-10-CM

## 2021-03-04 DIAGNOSIS — M54.50 LOW BACK PAIN, UNSPECIFIED BACK PAIN LATERALITY, UNSPECIFIED CHRONICITY, UNSPECIFIED WHETHER SCIATICA PRESENT: Primary | ICD-10-CM

## 2021-03-04 LAB
ANION GAP SERPL CALCULATED.3IONS-SCNC: 9 MEQ/L (ref 9–15)
BUN BLDV-MCNC: 9 MG/DL (ref 6–20)
CALCIUM SERPL-MCNC: 9.3 MG/DL (ref 8.5–9.9)
CHLORIDE BLD-SCNC: 101 MEQ/L (ref 95–107)
CO2: 26 MEQ/L (ref 20–31)
CREAT SERPL-MCNC: 0.8 MG/DL (ref 0.5–0.9)
EKG ATRIAL RATE: 63 BPM
EKG P AXIS: 37 DEGREES
EKG P-R INTERVAL: 142 MS
EKG Q-T INTERVAL: 412 MS
EKG QRS DURATION: 80 MS
EKG QTC CALCULATION (BAZETT): 421 MS
EKG R AXIS: 32 DEGREES
EKG T AXIS: 40 DEGREES
EKG VENTRICULAR RATE: 63 BPM
GFR AFRICAN AMERICAN: >60
GFR NON-AFRICAN AMERICAN: >60
GLUCOSE BLD-MCNC: 83 MG/DL (ref 70–99)
HCT VFR BLD CALC: 41.1 % (ref 37–47)
HEMOGLOBIN: 13.4 G/DL (ref 12–16)
MCH RBC QN AUTO: 29.1 PG (ref 27–31.3)
MCHC RBC AUTO-ENTMCNC: 32.6 % (ref 33–37)
MCV RBC AUTO: 89.4 FL (ref 82–100)
PDW BLD-RTO: 14.3 % (ref 11.5–14.5)
PLATELET # BLD: 301 K/UL (ref 130–400)
POTASSIUM SERPL-SCNC: 3.8 MEQ/L (ref 3.4–4.9)
RBC # BLD: 4.6 M/UL (ref 4.2–5.4)
SODIUM BLD-SCNC: 136 MEQ/L (ref 135–144)
WBC # BLD: 6.3 K/UL (ref 4.8–10.8)

## 2021-03-04 PROCEDURE — 85027 COMPLETE CBC AUTOMATED: CPT

## 2021-03-04 PROCEDURE — 80048 BASIC METABOLIC PNL TOTAL CA: CPT

## 2021-03-04 PROCEDURE — 93005 ELECTROCARDIOGRAM TRACING: CPT | Performed by: NURSE PRACTITIONER

## 2021-03-04 RX ORDER — SODIUM CHLORIDE 0.9 % (FLUSH) 0.9 %
10 SYRINGE (ML) INJECTION EVERY 12 HOURS SCHEDULED
Status: CANCELLED | OUTPATIENT
Start: 2021-03-11

## 2021-03-04 RX ORDER — LIDOCAINE HYDROCHLORIDE 10 MG/ML
1 INJECTION, SOLUTION EPIDURAL; INFILTRATION; INTRACAUDAL; PERINEURAL
Status: CANCELLED | OUTPATIENT
Start: 2021-03-11 | End: 2021-03-11

## 2021-03-04 RX ORDER — CEFAZOLIN SODIUM 2 G/50ML
2000 SOLUTION INTRAVENOUS ONCE
Status: CANCELLED | OUTPATIENT
Start: 2021-03-11

## 2021-03-04 RX ORDER — SODIUM CHLORIDE, SODIUM LACTATE, POTASSIUM CHLORIDE, CALCIUM CHLORIDE 600; 310; 30; 20 MG/100ML; MG/100ML; MG/100ML; MG/100ML
INJECTION, SOLUTION INTRAVENOUS CONTINUOUS
Status: CANCELLED | OUTPATIENT
Start: 2021-03-11

## 2021-03-04 RX ORDER — SODIUM CHLORIDE 0.9 % (FLUSH) 0.9 %
10 SYRINGE (ML) INJECTION PRN
Status: CANCELLED | OUTPATIENT
Start: 2021-03-11

## 2021-03-04 ASSESSMENT — ENCOUNTER SYMPTOMS
ALLERGIC/IMMUNOLOGIC NEGATIVE: 1
STRIDOR: 0
VOMITING: 0
CHEST TIGHTNESS: 0
SHORTNESS OF BREATH: 0
COUGH: 0
SORE THROAT: 0
CONSTIPATION: 1
TROUBLE SWALLOWING: 0
WHEEZING: 0
EYES NEGATIVE: 1
BACK PAIN: 1
NAUSEA: 0
DIARRHEA: 1

## 2021-03-04 NOTE — H&P
LIGATION      at age 39   [address]  10/20/15    Kristin Steve MD    VENTRAL HERNIA REPAIR N/A 2018    With mesh         Medications Prior to Admission:    Current Outpatient Medications   Medication Sig Dispense Refill    naltrexone-buPROPion (CONTRAVE) 8-90 MG per extended release tablet Take 2 tablets by mouth 2 times daily 120 tablet 2    fluticasone (FLONASE) 50 MCG/ACT nasal spray 1 spray by Each Nostril route daily 1 Bottle 1    Naproxen Sodium (ALEVE) 220 MG CAPS Take by mouth      acetaminophen (TYLENOL) 325 MG tablet Take 650 mg by mouth every 6 hours as needed for Pain      cetirizine (ZYRTEC) 10 MG tablet Take 10 mg by mouth daily as needed        No current facility-administered medications for this encounter.         Allergies:  Adhesive tape, Macrobid [nitrofurantoin monohydrate macrocrystals], and Sulfa antibiotics    Social History:   Social History     Socioeconomic History    Marital status:      Spouse name: Not on file    Number of children: Not on file    Years of education: Not on file    Highest education level: Not on file   Occupational History    Not on file   Social Needs    Financial resource strain: Not hard at all   [employer] insecurity     Worry: Never true     Inability: Never true   [employer] needs     Medical: No     Non-medical: No   Tobacco Use    Smoking status: Former Smoker     Packs/day: 0.50     Years: 10.00     Pack years: 5.00     Types: Cigarettes     Quit date: 2004     Years since quittin.3    Smokeless tobacco: Never Used   Substance and Sexual Activity    Alcohol use: Yes     Comment: socially    Drug use: No    Sexual activity: Yes   Lifestyle    Physical activity     Days per week: Not on file     Minutes per session: Not on file    Stress: Not on file   Relationships    Social connections     Talks on phone: Not on file     Gets together: Not on file     Attends [employer] service: Not on file Active member of club or organization: Not on file     Attends meetings of clubs or organizations: Not on file     Relationship status: Not on file    Intimate partner violence     Fear of current or ex partner: Not on file     Emotionally abused: Not on file     Physically abused: Not on file     Forced sexual activity: Not on file   Other Topics Concern    Not on file   Social History Narrative    Not on file       Family History:       Problem Relation Age of Onset    Cancer Mother 47        breast & uterine cancer    Hypertension Mother     High Blood Pressure Mother     Breast Cancer Mother     Other Father         car accident at age 22    Cancer Maternal Grandmother         colon    Diabetes Paternal Grandfather     No Known Problems Sister     No Known Problems Daughter        Review of Systems   Constitutional: Negative. Negative for chills and fever. HENT: Negative for congestion, postnasal drip, sore throat, tinnitus and trouble swallowing. Eyes: Negative. Negative for visual disturbance. Respiratory: Negative for cough, chest tightness, shortness of breath, wheezing and stridor. Cardiovascular: Negative for chest pain and palpitations. Gastrointestinal: Positive for constipation and diarrhea. Negative for nausea and vomiting. IBS   Endocrine: Negative. Genitourinary: Negative for dysuria and frequency. Musculoskeletal: Positive for back pain (radiates to both legs) and neck pain. Negative for myalgias. Skin: Negative. Allergic/Immunologic: Negative. Neurological: Negative. Negative for seizures and headaches. Hematological: Negative. Psychiatric/Behavioral: Negative. Vitals:  LMP 03/05/2016     Physical Exam  Constitutional:       Appearance: She is well-developed. She is obese. HENT:      Head: Normocephalic and atraumatic.       Right Ear: Tympanic membrane, ear canal and external ear normal.      Left Ear: Tympanic membrane, ear canal and external ear normal.      Mouth/Throat:      Mouth: Mucous membranes are moist.   Eyes:      General: No scleral icterus. Extraocular Movements: Extraocular movements intact. Conjunctiva/sclera: Conjunctivae normal.      Pupils: Pupils are equal, round, and reactive to light. Comments: Contacts OU. Neck:      Musculoskeletal: Normal range of motion and neck supple. Thyroid: No thyromegaly. Vascular: No JVD. Trachea: No tracheal deviation. Comments: Right anterior OR scar ( cervical fusion). Cardiovascular:      Rate and Rhythm: Normal rate and regular rhythm. Heart sounds: Normal heart sounds. No murmur. No gallop. Pulmonary:      Effort: Pulmonary effort is normal. No respiratory distress. Breath sounds: Normal breath sounds. No wheezing or rales. Abdominal:      General: Bowel sounds are normal.      Palpations: Abdomen is soft. Tenderness: There is no abdominal tenderness. Comments: OR scars of abdomen. Genitourinary:     Comments: Deferred. Musculoskeletal: Normal range of motion. Right lower leg: No edema. Left lower leg: No edema. Comments: Lumbar spine OR scars. Lymphadenopathy:      Cervical: No cervical adenopathy. Skin:     General: Skin is warm and dry. Findings: No erythema. Neurological:      Mental Status: She is alert and oriented to person, place, and time. Gait: Gait abnormal (left leg limp.). Psychiatric:         Mood and Affect: Mood normal.         Behavior: Behavior normal.         Thought Content:  Thought content normal.         Judgment: Judgment normal.         [unfilled]    Assessment:  Patient Active Problem List   Diagnosis    Anemia    Herniated cervical disc    Cervical radiculopathy at C6    Cervical disc disorder at C5-C6 level with radiculopathy    IBS (irritable bowel syndrome)    Polycystic ovary syndrome    Asthma    Depression with anxiety    Sinus congestion    Black-out (not amnesia)    Disorder of intervertebral disc at C5-C6 level with radiculopathy    Obesity    Recurrent ventral hernia    Low back pain         Plan:  Scheduled for DCS trial.    MAURICIO Galvin - CNP  3/4/2021  1:51 PM

## 2021-03-05 LAB
SARS-COV-2: NOT DETECTED
SOURCE: NORMAL

## 2021-03-05 PROCEDURE — 93010 ELECTROCARDIOGRAM REPORT: CPT | Performed by: INTERNAL MEDICINE

## 2021-03-10 NOTE — H&P
Talita Kelly  (1975)     1-20-21          Subjective:      Talita Kelly is 54 y.o. female who complains today of:         Chief Complaint   Patient presents with    Back Pain      She is here for follow up after physical therapy. Feels worse compared to last visit. [age]-year-old female with a history of three back surgeries with most recent one in 2006. She complains of bilateral lateral hip pain with radiation down bilateral lateral legs into the bilateral lateral feet, worse on the left. Pain is rated from 2-7:10. Pain is worse at the end of the day and exacerbated with walking. Symptoms are relieved with sitting. Pain hinders her daily activities. Denies family history of back problems. She has lost 14 pounds since last visit.       History of ACDF 10-28-16 by Dr. Rubi Patino.     Denies recent fever or chills. No bladder or bowel incontinence. Denies heart attack or cancer.   She quit smoking 16 years ago.         Narcotics: Tramadol, Neurontin 400 mg bid, 800 mg qhs, Aleve, Tylenol, Ibuprofen   Conservative treatments: Pain management injections with Dr. 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         Allergies:  Adhesive tape, Macrobid [nitrofurantoin monohydrate macrocrystals], and Sulfa antibiotics     Past Medical History        Past Medical History:   Diagnosis Date    Anxiety and depression      Asthma       childhood only    Chronic back pain      Depression      Gastrointestinal problem      Irritable bowel syndrome      Mental disorder      PCOS (polycystic ovarian syndrome)      Urinary incontinence       stress         Past Surgical History         Past Surgical History:   Procedure Laterality Date    BACK SURGERY         lumbar microdiscectomy x 3     BLADDER SUSPENSION   2013    BREAST BIOPSY Bilateral 1/16/13     U/S Guided core bx of 2 solid nodules int he right and left breast    CERVICAL FUSION N/A 10/28/2016     ACDF ANTERIOR CERVICAL DISKECTOMY FUSION C5-6 USING CC TRIAD HELIX MINI-PLATE, 1.5 HRS, Dinorah Calderon REP  performed by Tia Bellmay MD at [address]    CHOLECYSTECTOMY        COLONOSCOPY        ELBOW SURGERY         pins in and out, pt broke tip of elbow    ENDOMETRIAL ABLATION       ENDOSCOPY, COLON, DIAGNOSTIC        FRACTURE SURGERY         fx / left elbow / pins in & then removed    GALLBLADDER SURGERY        HERNIA REPAIR   2018     Repair recurrent VH with mesh    HYSTERECTOMY, TOTAL ABDOMINAL   16     DR RAYA    SPINE SURGERY   , , 2006     lumbar    TUBAL LIGATION        UMBILICAL HERNIA REPAIR   10/20/15     Waldemar Rey MD    VENTRAL HERNIA REPAIR N/A 2018     With mesh         Family History         Family History   Problem Relation Age of Onset   Rock Samples Cancer Mother 47         breast    Hypertension Mother      High Blood Pressure Mother      Other Father           car accident    Cancer Maternal Grandmother           colon    Diabetes Paternal Grandfather           Social History               Socioeconomic History    Marital status:        Spouse name: Not on file    Number of children: Not on file    Years of education: Not on file    Highest education level: Not on file   Occupational History    Not on file   Social Needs    Financial resource strain: Not hard at all   Nelly-Rita insecurity       Worry: Never true       Inability: Never true    Transportation needs       Medical: No       Non-medical: No   Tobacco Use    Smoking status: Former Smoker       Packs/day: 0.50       Years: 10.00       Pack years: 5.00       Types: Cigarettes       Quit date: 2004       Years since quittin.1    Smokeless tobacco: Never Used   Substance and Sexual Activity    Alcohol use:  Yes       Comment: socially    Drug use: No    Sexual activity: Yes   Lifestyle    Physical activity       Days per week: Not on file       Minutes per session: Not on file    Stress: Not on file   Relationships    Social connections       Talks on phone: Not on file       Gets together: Not on file       Attends [employer] service: Not on file       Active member of club or organization: Not on file       Attends meetings of clubs or organizations: Not on file       Relationship status: Not on file    Intimate partner violence       Fear of current or ex partner: Not on file       Emotionally abused: Not on file       Physically abused: Not on file       Forced sexual activity: Not on file   Other Topics Concern    Not on file   Social History Narrative    Not on file                   Current Outpatient Medications on File Prior to Visit   Medication Sig Dispense Refill    gabapentin (NEURONTIN) 400 MG capsule Take 1 capsule by mouth 3 times daily for 60 days. 180 capsule 0    naltrexone-buPROPion (CONTRAVE) 8-90 MG per extended release tablet Take 2 tablets by mouth 2 times daily 120 tablet 2    fluticasone (FLONASE) 50 MCG/ACT nasal spray 1 spray by Each Nostril route daily 1 Bottle 1    Naproxen Sodium (ALEVE) 220 MG CAPS Take by mouth        acetaminophen (TYLENOL) 325 MG tablet Take 650 mg by mouth every 6 hours as needed for Pain        cetirizine (ZYRTEC) 10 MG tablet Take 10 mg by mouth daily as needed           No current facility-administered medications on file prior to visit.                   Review of Systems   Constitutional: Negative for fever. HENT: Negative for hearing loss. Respiratory: Negative for shortness of breath. Gastrointestinal: Negative for constipation, diarrhea and nausea. Genitourinary: Negative for difficulty urinating. Musculoskeletal: Positive for back pain and neck pain. Skin: Negative for rash. Neurological: Negative for headaches. Hematological: Does not bruise/bleed easily.    Psychiatric/Behavioral: Positive for sleep disturbance.            Objective:      Vitals:  Temp 97.7 °F (36.5 °C)   Ht 5' 4\" (1.626 m)   Wt 256 lb (116.1 kg)   LMP 03/05/2016   BMI 43.94 kg/m²          Physical Exam  Musculoskeletal:      Lumbar back: She exhibits decreased range of motion and tenderness (No sciatic notch tenderness. ). Neurological:      Mental Status: She is alert and oriented to person, place, and time. Sensory: No sensory deficit (Intact to light touch and pinprick. ). Gait: Gait normal.      Deep Tendon Reflexes: Babinski sign (Downgoing.) absent on the right side. Babinski sign (Downgoing.) absent on the left side. Reflex Scores:       Patellar reflexes are 2+ on the right side and 2+ on the left side. Achilles reflexes are 2+ on the right side and 2+ on the left side. Comments: Incision site is clean and dry of anterior neck and lumbar spine midline to the right. 4+/5 left gastrocnemius and iliopsoas and 5/5 right.  5/5 bilateral quadriceps, TA and EHL. Negative bilateral straight leg-raise. No ankle clonus bilaterally.               Assessment:        Diagnosis Orders   1. Scoliosis, unspecified scoliosis type, unspecified spinal region  LAMINECTOMY THORACIC / [address] [place]   2. Lumbar spondylosis  LAMINECTOMY THORACIC / LUMBAR W/ PLACEMENT SPINAL CORD STIMULATOR   3. Lumbar disc herniation  LAMINECTOMY THORACIC / LUMBAR W/ PLACEMENT SPINAL CORD STIMULATOR   4. History of back surgery  LAMINECTOMY THORACIC / LUMBAR W/ PLACEMENT SPINAL CORD STIMULATOR   5. History of neck surgery  LAMINECTOMY THORACIC / LUMBAR W/ PLACEMENT SPINAL CORD STIMULATOR         Plan:              HISTORY OF PRESENT ILLNESS:  Cynthia Chandler returns to my office for continued follow-up. She has seen me previously with a history of multiple back surgeries x3 with continued back and left leg radiculopathy.   I glad to see she cut down on Cymbalta completely and Neurontin down to two, losing weight. Unfortunately the pain is continuing, affecting her daily activities.       PHYSICAL EXAMINATION:  Otherwise stable motor, sensory findings.     STUDIES:  MRI was reviewed by me.      IMPRESSION:  Multilevel lumbar spondylosis and degenerative changes throughout the whole lumbar spine with a history of multiple surgeries.     TREATMENT AND RECOMMENDATIONS:  Given her pain mostly in the buttocks and left leg pain worse than the back, the patient is to consider dorsal column stimulator. Brochure was given.   We will go ahead with a dorsal column stimulator trial.  Extensive corrective surgery is the last resort.            Giacomo Evans MD

## 2021-03-11 ENCOUNTER — HOSPITAL ENCOUNTER (OUTPATIENT)
Age: 46
Setting detail: OUTPATIENT SURGERY
Discharge: HOME | End: 2021-03-11
Attending: NEUROLOGICAL SURGERY | Admitting: NEUROLOGICAL SURGERY
Payer: COMMERCIAL

## 2021-03-11 ENCOUNTER — HOSPITAL ENCOUNTER (OUTPATIENT)
Dept: GENERAL RADIOLOGY | Age: 46
Setting detail: OUTPATIENT SURGERY
Discharge: HOME | End: 2021-03-13
Attending: NEUROLOGICAL SURGERY
Payer: COMMERCIAL

## 2021-03-11 ENCOUNTER — ANESTHESIA (OUTPATIENT)
Dept: OPERATING ROOM | Age: 46
End: 2021-03-11
Payer: COMMERCIAL

## 2021-03-11 ENCOUNTER — ANESTHESIA EVENT (OUTPATIENT)
Dept: OPERATING ROOM | Age: 46
End: 2021-03-11
Payer: COMMERCIAL

## 2021-03-11 VITALS — SYSTOLIC BLOOD PRESSURE: 123 MMHG | DIASTOLIC BLOOD PRESSURE: 65 MMHG | OXYGEN SATURATION: 100 %

## 2021-03-11 VITALS
RESPIRATION RATE: 16 BRPM | TEMPERATURE: 97.4 F | SYSTOLIC BLOOD PRESSURE: 128 MMHG | WEIGHT: 252 LBS | HEART RATE: 84 BPM | HEIGHT: 64 IN | BODY MASS INDEX: 43.02 KG/M2 | OXYGEN SATURATION: 97 % | DIASTOLIC BLOOD PRESSURE: 80 MMHG

## 2021-03-11 DIAGNOSIS — R52 PAIN: ICD-10-CM

## 2021-03-11 PROCEDURE — C1897 LEAD, NEUROSTIM TEST KIT: HCPCS | Performed by: NEUROLOGICAL SURGERY

## 2021-03-11 PROCEDURE — 3600000013 HC SURGERY LEVEL 3 ADDTL 15MIN: Performed by: NEUROLOGICAL SURGERY

## 2021-03-11 PROCEDURE — 7100000010 HC PHASE II RECOVERY - FIRST 15 MIN: Performed by: NEUROLOGICAL SURGERY

## 2021-03-11 PROCEDURE — 2720000010 HC SURG SUPPLY STERILE: Performed by: NEUROLOGICAL SURGERY

## 2021-03-11 PROCEDURE — 3600000003 HC SURGERY LEVEL 3 BASE: Performed by: NEUROLOGICAL SURGERY

## 2021-03-11 PROCEDURE — 6360000002 HC RX W HCPCS: Performed by: NURSE PRACTITIONER

## 2021-03-11 PROCEDURE — 3700000001 HC ADD 15 MINUTES (ANESTHESIA): Performed by: NEUROLOGICAL SURGERY

## 2021-03-11 PROCEDURE — 6360000002 HC RX W HCPCS: Performed by: REGISTERED NURSE

## 2021-03-11 PROCEDURE — 2580000003 HC RX 258: Performed by: NURSE PRACTITIONER

## 2021-03-11 PROCEDURE — 2580000003 HC RX 258: Performed by: ANESTHESIOLOGY

## 2021-03-11 PROCEDURE — 2500000003 HC RX 250 WO HCPCS: Performed by: NEUROLOGICAL SURGERY

## 2021-03-11 PROCEDURE — 2709999900 HC NON-CHARGEABLE SUPPLY: Performed by: NEUROLOGICAL SURGERY

## 2021-03-11 PROCEDURE — 3700000000 HC ANESTHESIA ATTENDED CARE: Performed by: NEUROLOGICAL SURGERY

## 2021-03-11 PROCEDURE — 3209999900 FLUORO FOR SURGICAL PROCEDURES

## 2021-03-11 PROCEDURE — 7100000011 HC PHASE II RECOVERY - ADDTL 15 MIN: Performed by: NEUROLOGICAL SURGERY

## 2021-03-11 PROCEDURE — 6370000000 HC RX 637 (ALT 250 FOR IP): Performed by: NEUROLOGICAL SURGERY

## 2021-03-11 DEVICE — KIT LD L60CM 1X8 COMP TRL SCRN FOR SACR NRV STIM VECTRIS: Type: IMPLANTABLE DEVICE | Site: SPINE LUMBAR | Status: FUNCTIONAL

## 2021-03-11 RX ORDER — SODIUM CHLORIDE 0.9 % (FLUSH) 0.9 %
10 SYRINGE (ML) INJECTION EVERY 12 HOURS SCHEDULED
Status: DISCONTINUED | OUTPATIENT
Start: 2021-03-11 | End: 2021-03-11 | Stop reason: HOSPADM

## 2021-03-11 RX ORDER — LIDOCAINE HYDROCHLORIDE 10 MG/ML
1 INJECTION, SOLUTION EPIDURAL; INFILTRATION; INTRACAUDAL; PERINEURAL
Status: DISCONTINUED | OUTPATIENT
Start: 2021-03-11 | End: 2021-03-11 | Stop reason: HOSPADM

## 2021-03-11 RX ORDER — SODIUM CHLORIDE, SODIUM LACTATE, POTASSIUM CHLORIDE, CALCIUM CHLORIDE 600; 310; 30; 20 MG/100ML; MG/100ML; MG/100ML; MG/100ML
INJECTION, SOLUTION INTRAVENOUS CONTINUOUS
Status: DISCONTINUED | OUTPATIENT
Start: 2021-03-11 | End: 2021-03-11 | Stop reason: HOSPADM

## 2021-03-11 RX ORDER — FENTANYL CITRATE 50 UG/ML
INJECTION, SOLUTION INTRAMUSCULAR; INTRAVENOUS PRN
Status: DISCONTINUED | OUTPATIENT
Start: 2021-03-11 | End: 2021-03-11 | Stop reason: SDUPTHER

## 2021-03-11 RX ORDER — WOUND DRESSING ADHESIVE - LIQUID
LIQUID MISCELLANEOUS PRN
Status: DISCONTINUED | OUTPATIENT
Start: 2021-03-11 | End: 2021-03-11 | Stop reason: ALTCHOICE

## 2021-03-11 RX ORDER — SODIUM CHLORIDE 0.9 % (FLUSH) 0.9 %
10 SYRINGE (ML) INJECTION PRN
Status: DISCONTINUED | OUTPATIENT
Start: 2021-03-11 | End: 2021-03-11 | Stop reason: HOSPADM

## 2021-03-11 RX ORDER — LIDOCAINE HYDROCHLORIDE 10 MG/ML
INJECTION, SOLUTION EPIDURAL; INFILTRATION; INTRACAUDAL; PERINEURAL PRN
Status: DISCONTINUED | OUTPATIENT
Start: 2021-03-11 | End: 2021-03-11 | Stop reason: ALTCHOICE

## 2021-03-11 RX ORDER — CEFAZOLIN SODIUM 2 G/50ML
2000 SOLUTION INTRAVENOUS ONCE
Status: COMPLETED | OUTPATIENT
Start: 2021-03-11 | End: 2021-03-11

## 2021-03-11 RX ADMIN — FENTANYL CITRATE 50 MCG: 50 INJECTION, SOLUTION INTRAMUSCULAR; INTRAVENOUS at 14:20

## 2021-03-11 RX ADMIN — CEFAZOLIN SODIUM 2 G: 2 SOLUTION INTRAVENOUS at 14:14

## 2021-03-11 RX ADMIN — SODIUM CHLORIDE, POTASSIUM CHLORIDE, SODIUM LACTATE AND CALCIUM CHLORIDE 50 ML/HR: 600; 310; 30; 20 INJECTION, SOLUTION INTRAVENOUS at 12:24

## 2021-03-11 RX ADMIN — SODIUM CHLORIDE, POTASSIUM CHLORIDE, SODIUM LACTATE AND CALCIUM CHLORIDE: 600; 310; 30; 20 INJECTION, SOLUTION INTRAVENOUS at 14:14

## 2021-03-11 RX ADMIN — FENTANYL CITRATE 50 MCG: 50 INJECTION, SOLUTION INTRAMUSCULAR; INTRAVENOUS at 14:33

## 2021-03-11 ASSESSMENT — PULMONARY FUNCTION TESTS
PIF_VALUE: 1
PIF_VALUE: 1
PIF_VALUE: 0
PIF_VALUE: 1
PIF_VALUE: 0
PIF_VALUE: 1
PIF_VALUE: 1
PIF_VALUE: 0
PIF_VALUE: 1
PIF_VALUE: 0
PIF_VALUE: 1
PIF_VALUE: 2
PIF_VALUE: 1

## 2021-03-11 ASSESSMENT — PAIN DESCRIPTION - DESCRIPTORS: DESCRIPTORS: ACHING;CONSTANT

## 2021-03-11 NOTE — OP NOTE
Operative Note  ______________________________________________________________    Patient: Tashi Fowler  YOB: 1975  MRN: [id number]  Date of Procedure: 3/11/2021    Pre-Op Diagnosis: RADICULOPATHY, SPONDYLOSIS multiple previous back surgeries with failed back syndrome    Post-Op Diagnosis: Same       Procedure(s):  DORSAL COLUMN STIMULATOR TRIAL, placement of a percutaneous DCS electrode, intraoperative programming, and use of fluoroscopy    Anesthesia: Monitor Anesthesia Care    Surgeon(s):  Braeden Dinero MD    Staff:    First Assistant: Lj Torres    Estimated Blood Loss: Minimal    Implants:  Implant Name Type Inv. Item Serial No.  Lot No. LRB No. Used Action   KIT LD L60CM 1X8 COMP TRL SCRN FOR SACR NRV STIM VECTRIS  KIT LD L60CM 1X8 COMP TRL SCRN FOR SACR NRV STIM VECTRIS  [facility] NE[facility]- [id number] N/A 1 Implanted         Procedure:  Patient is a 77-year-old female with history of multiple back surgeries now admitted for DCS trial.    She was given preoperative antibiotics and positioned on the table. Neck and pressure points are carefully inspected and good. Back was then cleaned with ChloraPrep and draped sterilely. Under fluoroscopy, 1% lidocaine was used for local injection along with MAC anesthesia using fentanyl. Touhy needle was used to to get into the epidural space between T12-L1 interlaminar space using glass syringe technique. Percutaneous DCS electrode was carefully placed with lead placed between middle of T8 8 to the top of T 10    Intraoperative programming was performed with satisfactory coverage of all her area of pain. Electrodes were then carefully secured in routine fashion. Patient was taken out of the OR in stable condition. Moving lower extremities.       Sayra Lopez MD   on 3/11/21 at 2:38 PM EST

## 2021-03-11 NOTE — ANESTHESIA PRE PROCEDURE
Department of Anesthesiology  Preprocedure Note       Name:  Hargis Felty   Age:  54 y.o.  :  1975                                          MRN:  [id number]         Date:  3/11/2021      Surgeon: Alexi Coyle):  Shae Cintron MD    Procedure: Procedure(s):  DCS (DORSAL COLUMN STIMULATOR) TRIAL MAC + LOCAL  .5 HOURS/ 1 C-ARM/ [facility] [place]    Medications prior to admission:   Prior to Admission medications    Medication Sig Start Date End Date Taking? Authorizing Provider   naltrexone-buPROPion (CONTRAVE) 8-90 MG per extended release tablet Take 2 tablets by mouth 2 times daily 2/10/21 5/11/21  MAURICIO Marie - CNP   fluticasone [facility]) 50 MCG/ACT nasal spray 1 spray by Each Nostril route daily 10/21/19   MAURICIO Marie CNP   Naproxen Sodium (ALEVE) 220 MG CAPS Take by mouth    Historical Provider, MD   acetaminophen (TYLENOL) 325 MG tablet Take 650 mg by mouth every 6 hours as needed for Pain    Historical Provider, MD   cetirizine (ZYRTEC) 10 MG tablet Take 10 mg by mouth daily as needed     Historical Provider, MD       Current medications:    No current facility-administered medications for this encounter. Current Outpatient Medications   Medication Sig Dispense Refill    naltrexone-buPROPion (CONTRAVE) 8-90 MG per extended release tablet Take 2 tablets by mouth 2 times daily 120 tablet 2    fluticasone (FLONASE) 50 MCG/ACT nasal spray 1 spray by Each Nostril route daily 1 Bottle 1    Naproxen Sodium (ALEVE) 220 MG CAPS Take by mouth      acetaminophen (TYLENOL) 325 MG tablet Take 650 mg by mouth every 6 hours as needed for Pain      cetirizine (ZYRTEC) 10 MG tablet Take 10 mg by mouth daily as needed          Allergies:     Allergies   Allergen Reactions    Adhesive Tape Rash    Macrobid [Nitrofurantoin Monohydrate Macrocrystals] Rash    Sulfa Antibiotics Rash       Problem List:    Patient Active Problem List   Diagnosis Code    Anemia D64.9    Herniated cervical disc M50.20    Cervical radiculopathy at C6 M54.12    Cervical disc disorder at C5-C6 level with radiculopathy M50.122    IBS (irritable bowel syndrome) K58.9    Polycystic ovary syndrome E28.2    Asthma J45.909    Depression with anxiety F41.8    Sinus congestion R09.81    Black-out (not amnesia) R55    Disorder of intervertebral disc at C5-C6 level with radiculopathy M50.122    Obesity E66.9    Recurrent ventral hernia K43.2    Low back pain M54.5       Past Medical History:        Diagnosis Date    Anxiety and depression     Arthritis     Asthma     childhood only    Chronic back pain     Depression     Gastrointestinal problem     Irritable bowel syndrome     Mental disorder     PCOS (polycystic ovarian syndrome)     Urinary incontinence     stress       Past Surgical History:        Procedure Laterality Date    BACK SURGERY  0445,2035,3060    lumbar microdiscectomy x 3 -- last 2006    BACK SURGERY  2016    cervical fusion     BLADDER SUSPENSION  2013    BREAST BIOPSY Bilateral 1/16/13    U/S Guided core bx of 2 solid nodules int he right and left breast    CERVICAL FUSION N/A 10/28/2016    ACDF ANTERIOR CERVICAL DISKECTOMY FUSION C5-6 USING CC TRIAD HELIX MINI-PLATE, 1.5 HRS, NUVASIVE REP  performed by Amanda Dodd MD at [facility]. [address] & 2007    CHOLECYSTECTOMY  2005    COLONOSCOPY      ELBOW SURGERY      pins in and out, pt broke tip of elbow    ENDOMETRIAL ABLATION  2014    ENDOSCOPY, COLON, DIAGNOSTIC      FRACTURE SURGERY  1985    fx / left elbow / pins in & then removed   Nitin Myles  12/2018    Repair recurrent VH with mesh    HYSTERECTOMY, TOTAL ABDOMINAL  04/07/16     [facility] SURGERY  2001, 2005, 2006    lumbar    TUBAL LIGATION      at age 39   [address]  10/20/15    Marlee Daugherty MD   [address]  N/A 12/13/2018    With mesh       Social History:    Social History     Tobacco Use    Smoking status: Former Smoker     Packs/day: 0.50     Years: 10.00     Pack years: 5.00     Types: Cigarettes     Quit date: 2004     Years since quittin.3    Smokeless tobacco: Never Used   Substance Use Topics    Alcohol use: Yes     Comment: socially                                Counseling given: Not Answered      Vital Signs (Current): There were no vitals filed for this visit. BP Readings from Last 3 Encounters:   21 133/71   20 124/78   11/10/20 122/84       NPO Status:                                                                                 BMI:   Wt Readings from Last 3 Encounters:   21 257 lb (116.6 kg)   21 251 lb (113.9 kg)   21 256 lb (116.1 kg)     There is no height or weight on file to calculate BMI.    CBC:   Lab Results   Component Value Date    WBC 6.3 2021    RBC 4.60 2021    HGB 13.4 2021    HCT 41.1 2021    MCV 89.4 2021    RDW 14.3 2021     2021       CMP:   Lab Results   Component Value Date     2021    K 3.8 2021     2021    CO2 26 2021    BUN 9 2021    CREATININE 0.80 2021    GFRAA >60.0 2021    LABGLOM >60.0 2021    GLUCOSE 83 2021    CALCIUM 9.3 2021       POC Tests: No results for input(s): POCGLU, POCNA, POCK, POCCL, POCBUN, POCHEMO, POCHCT in the last 72 hours.     Coags:   Lab Results   Component Value Date    PROTIME 10.0 10/27/2016    INR 0.9 10/27/2016       HCG (If Applicable):   Lab Results   Component Value Date    PREGTESTUR Negative 2016        ABGs: No results found for: PHART, PO2ART, ZGH7SSE, OQA8EMP, BEART, D2RSNXPB     Type & Screen (If Applicable):  No results found for: LABABO, LABRH    Drug/Infectious Status (If Applicable):  No results found for: HIV, HEPCAB    COVID-19 Screening (If Applicable):   Lab Results   Component Value Date    COVID19

## 2021-03-11 NOTE — BRIEF OP NOTE
Brief Postoperative Note      Patient: Charlotte Crystal  YOB: 1975  MRN: [id number]    Date of Procedure: 3/11/2021    Pre-Op Diagnosis: RADICULOPATHY, SPONDYLOSIS    Post-Op Diagnosis: Same       Procedure(s):  DORSAL COLUMN STIMULATOR TRIAL    Surgeon(s):  Lee Simons MD    Assistant:  First Assistant: Fina Armenta    Anesthesia: Monitor Anesthesia Care    Estimated Blood Loss (mL): Minimal    Complications: None    Specimens:   * No specimens in log *    Implants:  Implant Name Type Inv.  Item Serial No.  Lot No. LRB No. Used Action   KIT LD L60CM 1X8 COMP TRL SCRN FOR SACR NRV STIM VECTRIS  KIT LD L60CM 1X8 COMP TRL SCRN FOR SACR NRV STIM [address] [employer] [id number] N/A 1 Implanted           Electronically signed by Lev Crabtree MD on 3/11/2021 at 2:38 PM

## 2021-03-30 ENCOUNTER — OFFICE VISIT (OUTPATIENT)
Dept: FAMILY MEDICINE CLINIC | Age: 46
End: 2021-03-30
Payer: COMMERCIAL

## 2021-03-30 VITALS
HEART RATE: 91 BPM | DIASTOLIC BLOOD PRESSURE: 78 MMHG | TEMPERATURE: 98.1 F | OXYGEN SATURATION: 99 % | WEIGHT: 256.6 LBS | BODY MASS INDEX: 43.81 KG/M2 | HEIGHT: 64 IN | SYSTOLIC BLOOD PRESSURE: 122 MMHG

## 2021-03-30 DIAGNOSIS — E66.01 CLASS 3 SEVERE OBESITY DUE TO EXCESS CALORIES WITHOUT SERIOUS COMORBIDITY WITH BODY MASS INDEX (BMI) OF 45.0 TO 49.9 IN ADULT (HCC): ICD-10-CM

## 2021-03-30 DIAGNOSIS — B37.2 CANDIDAL DERMATITIS: Primary | ICD-10-CM

## 2021-03-30 PROCEDURE — G8427 DOCREV CUR MEDS BY ELIG CLIN: HCPCS | Performed by: NURSE PRACTITIONER

## 2021-03-30 PROCEDURE — 1036F TOBACCO NON-USER: CPT | Performed by: NURSE PRACTITIONER

## 2021-03-30 PROCEDURE — 99213 OFFICE O/P EST LOW 20 MIN: CPT | Performed by: NURSE PRACTITIONER

## 2021-03-30 PROCEDURE — G8417 CALC BMI ABV UP PARAM F/U: HCPCS | Performed by: NURSE PRACTITIONER

## 2021-03-30 PROCEDURE — G8482 FLU IMMUNIZE ORDER/ADMIN: HCPCS | Performed by: NURSE PRACTITIONER

## 2021-03-30 RX ORDER — NYSTATIN 100000 [USP'U]/G
POWDER TOPICAL
Qty: 60 G | Refills: 2 | Status: SHIPPED | OUTPATIENT
Start: 2021-03-30 | End: 2021-04-21

## 2021-03-30 RX ORDER — FLUCONAZOLE 150 MG/1
150 TABLET ORAL WEEKLY
Qty: 2 TABLET | Refills: 0 | Status: SHIPPED | OUTPATIENT
Start: 2021-03-30 | End: 2021-04-07

## 2021-03-30 ASSESSMENT — ENCOUNTER SYMPTOMS
COLOR CHANGE: 1
COUGH: 0
SHORTNESS OF BREATH: 0

## 2021-03-30 NOTE — PROGRESS NOTES
Subjective  Chief Complaint   Patient presents with    3 Month Follow-Up    Weight Management     needs a refill on contrave. HPI     Following up with contrave and weight loss. Less cravings of junk food. Did spinal stimulation trial. Approved and helped. Now waiting to get scheduled for the device.      Patient Active Problem List    Diagnosis Date Noted    Recurrent ventral hernia 11/23/2018     Priority: High    Low back pain 03/04/2021    Obesity 08/29/2017    Disorder of intervertebral disc at C5-C6 level with radiculopathy 11/22/2016    Cervical disc disorder at C5-C6 level with radiculopathy 10/27/2016    IBS (irritable bowel syndrome) 10/27/2016    Polycystic ovary syndrome 10/27/2016    Asthma 10/27/2016    Depression with anxiety 10/27/2016    Sinus congestion 10/27/2016    Black-out (not amnesia) 10/27/2016    Herniated cervical disc 10/14/2016    Cervical radiculopathy at C6 10/14/2016    Anemia 10/28/2013     Past Medical History:   Diagnosis Date    Anxiety and depression     Arthritis     Asthma     childhood only    Chronic back pain     Depression     Gastrointestinal problem     Irritable bowel syndrome     Mental disorder     PCOS (polycystic ovarian syndrome)     Urinary incontinence     stress     Past Surgical History:   Procedure Laterality Date    BACK SURGERY  2001,2005,2006    lumbar microdiscectomy x 3 -- last 2006    BACK SURGERY  2016    cervical fusion     BLADDER SUSPENSION  2013    BREAST BIOPSY Bilateral 1/16/13    U/S Guided core bx of 2 solid nodules int he right and left breast    CERVICAL FUSION N/A 10/28/2016    ACDF ANTERIOR CERVICAL DISKECTOMY FUSION C5-6 USING CC TRIAD HELIX MINI-PLATE, 1.5 HRS, NUVASIVE REP  performed by Pallavi Post MD at [facility]. [address] & 2007    CHOLECYSTECTOMY  2005    COLONOSCOPY      ELBOW SURGERY      pins in and out, pt broke tip of elbow    ENDOMETRIAL ABLATION  2014    ENDOSCOPY, COLON, DIAGNOSTIC      FRACTURE SURGERY  1985    fx / left elbow / pins in & then removed   [address] HERNIA REPAIR  2018    Repair recurrent VH with mesh    HYSTERECTOMY, TOTAL ABDOMINAL  16     [address] [facility]  , ,     lumbar    STIMULATOR SURGERY N/A 3/11/2021    DORSAL COLUMN STIMULATOR TRIAL performed by Mike Porter MD at [place] [address]      at age 39   [address]  10/20/15    Jose Antonio Oden MD   [address] [facility]    With mesh     Family History   Problem Relation Age of Onset    Cancer Mother 47        breast & uterine cancer    Hypertension Mother     High Blood Pressure Mother    Triny Brown Breast Cancer Mother     Other Father         car accident at age 22    Cancer Maternal Grandmother         colon    Diabetes Paternal Grandfather     No Known Problems Sister     No Known Problems Daughter      Social History     Socioeconomic History    Marital status:      Spouse name: None    Number of children: None    Years of education: None    Highest education level: None   Occupational History    None   Social Needs    Financial resource strain: Not hard at all   [place]-Rita insecurity     Worry: Never true     Inability: Never true    Transportation needs     Medical: No     Non-medical: No   Tobacco Use    Smoking status: Former Smoker     Packs/day: 0.50     Years: 10.00     Pack years: 5.00     Types: Cigarettes     Quit date: 2004     Years since quittin.3    Smokeless tobacco: Never Used   Substance and Sexual Activity    Alcohol use: Yes     Comment: socially    Drug use: No    Sexual activity: Yes   Lifestyle    Physical activity     Days per week: None     Minutes per session: None    Stress: None   Relationships    Social connections     Talks on phone: None     Gets together: None     Attends [employer] service: None     Active member of club or organization: None     Attends meetings of clubs or organizations: None     Relationship status: None    Intimate partner violence     Fear of current or ex partner: None     Emotionally abused: None     Physically abused: None     Forced sexual activity: None   Other Topics Concern    None   Social History Narrative    None     Current Outpatient Medications on File Prior to Visit   Medication Sig Dispense Refill    fluticasone (FLONASE) 50 MCG/ACT nasal spray 1 spray by Each Nostril route daily 1 Bottle 1    Naproxen Sodium (ALEVE) 220 MG CAPS Take by mouth      acetaminophen (TYLENOL) 325 MG tablet Take 650 mg by mouth every 6 hours as needed for Pain      cetirizine (ZYRTEC) 10 MG tablet Take 10 mg by mouth daily as needed        No current facility-administered medications on file prior to visit. Allergies   Allergen Reactions    Adhesive Tape Rash    Macrobid [Nitrofurantoin Monohydrate Macrocrystals] Rash    Sulfa Antibiotics Rash       Review of Systems   Constitutional: Negative for fatigue. Respiratory: Negative for cough and shortness of breath. Cardiovascular: Negative for chest pain. Skin: Positive for color change and rash. Objective  Vitals:    03/30/21 0803   BP: 122/78   Site: Right Upper Arm   Position: Sitting   Cuff Size: Large Adult   Pulse: 91   Temp: 98.1 °F (36.7 °C)   SpO2: 99%   Weight: 256 lb 9.6 oz (116.4 kg)   Height: 5' 4\" (1.626 m)     Physical Exam  Vitals signs and nursing note reviewed. Constitutional:       Appearance: Normal appearance. She is normal weight. HENT:      Head: Normocephalic. Mouth/Throat:      Mouth: Mucous membranes are moist.   Eyes:      Pupils: Pupils are equal, round, and reactive to light. Cardiovascular:      Rate and Rhythm: Normal rate and regular rhythm. Pulses: Normal pulses. Heart sounds: Normal heart sounds. Pulmonary:      Effort: Pulmonary effort is normal.      Breath sounds: Normal breath sounds.    Skin:     General: Skin is warm.          Neurological:      General: No focal deficit present. Mental Status: She is alert and oriented to person, place, and time. Mental status is at baseline. Psychiatric:         Mood and Affect: Mood normal.         Behavior: Behavior normal.         Thought Content: Thought content normal.         Judgment: Judgment normal.       Assessment & Plan     Diagnosis Orders   1. Candidal dermatitis  fluconazole (DIFLUCAN) 150 MG tablet    nystatin (MYCOSTATIN) 880836 UNIT/GM powder   2. Class 3 severe obesity due to excess calories without serious comorbidity with body mass index (BMI) of 45.0 to 49.9 in adult (HCC)  naltrexone-buPROPion (CONTRAVE) 8-90 MG per extended release tablet       No orders of the defined types were placed in this encounter. Orders Placed This Encounter   Medications    naltrexone-buPROPion (CONTRAVE) 8-90 MG per extended release tablet     Sig: Take 2 tablets by mouth 2 times daily     Dispense:  120 tablet     Refill:  2    fluconazole (DIFLUCAN) 150 MG tablet     Sig: Take 1 tablet by mouth once a week for 2 doses     Dispense:  2 tablet     Refill:  0    nystatin (MYCOSTATIN) 124558 UNIT/GM powder     Sig: Apply 3 times daily. Dispense:  60 g     Refill:  2     Side effects, adverse effects of the medication prescribed today, as well as treatment plan/ rationale and result expectations have been discussed with the patient who expresses understanding and desires to proceed. Close follow up to evaluate treatment results and for coordination of care. I have reviewed the patient's medical history in detail and updated the computerized patient record. As always, patient is advised that if symptoms worsen in any way they will proceed to the nearest emergency room. Return in about 3 months (around 6/30/2021).     Eduardo Councilman, APRN - CNP

## 2021-04-13 ENCOUNTER — HOSPITAL ENCOUNTER (EMERGENCY)
Age: 46
Discharge: HOME | End: 2021-04-13
Attending: EMERGENCY MEDICINE
Payer: COMMERCIAL

## 2021-04-13 VITALS
RESPIRATION RATE: 16 BRPM | HEIGHT: 64 IN | OXYGEN SATURATION: 96 % | HEART RATE: 93 BPM | TEMPERATURE: 98.5 F | SYSTOLIC BLOOD PRESSURE: 140 MMHG | DIASTOLIC BLOOD PRESSURE: 81 MMHG | BODY MASS INDEX: 42.68 KG/M2 | WEIGHT: 250 LBS

## 2021-04-13 DIAGNOSIS — S02.2XXA CLOSED FRACTURE OF NASAL BONE, INITIAL ENCOUNTER: Primary | ICD-10-CM

## 2021-04-13 PROCEDURE — 99282 EMERGENCY DEPT VISIT SF MDM: CPT

## 2021-04-13 PROCEDURE — 6370000000 HC RX 637 (ALT 250 FOR IP): Performed by: EMERGENCY MEDICINE

## 2021-04-13 RX ORDER — HYDROCODONE BITARTRATE AND ACETAMINOPHEN 5; 325 MG/1; MG/1
1 TABLET ORAL EVERY 6 HOURS PRN
Qty: 6 TABLET | Refills: 0 | Status: SHIPPED | OUTPATIENT
Start: 2021-04-13 | End: 2021-04-16

## 2021-04-13 RX ORDER — HYDROCODONE BITARTRATE AND ACETAMINOPHEN 5; 325 MG/1; MG/1
1 TABLET ORAL ONCE
Status: COMPLETED | OUTPATIENT
Start: 2021-04-13 | End: 2021-04-13

## 2021-04-13 RX ADMIN — HYDROCODONE BITARTRATE AND ACETAMINOPHEN 1 TABLET: 5; 325 TABLET ORAL at 20:53

## 2021-04-13 ASSESSMENT — ENCOUNTER SYMPTOMS
DIARRHEA: 0
VOMITING: 0
CONSTIPATION: 0
EYE DISCHARGE: 0
ABDOMINAL PAIN: 0
ABDOMINAL DISTENTION: 0
WHEEZING: 0
SHORTNESS OF BREATH: 0
FACIAL SWELLING: 1
NAUSEA: 0
EYE PAIN: 0
COUGH: 0
SINUS PAIN: 0
RHINORRHEA: 0

## 2021-04-14 NOTE — ED PROVIDER NOTES
[facility]"" ED  EMERGENCY MEDICINE     Pt Name: Caryn Herrera  MRN: [id number]  [place] 1975  Date of evaluation: 4/13/2021  PCP:    MAURICIO Lara - CNP  Provider: C[address]       Chief Complaint   Patient presents with    Epistaxis     x 3 hours    Facial Injury     soft ball to face       HISTORY OF PRESENT ILLNESS    HPI     Patient is a 39-year-old female with history of asthma, depression, PCOS, neck pain, low back pain presenting the emergency department with complaint of nasal pain and epistaxis that started about 3 hours ago after being hit by a softball at her daughter's baseball game. Patient denies blacking out or altered mentation after she did get hit with the ball. She states that her nose started bleeding immediately and has not been able to stop. She has had pressure on it the whole time. She denies any posterior pharyngeal bleeding. Denies headache or blurry vision at this time. Is not on any blood thinners. Did not take anything for pain before coming in today. Triage notes and Nursing notes were reviewed by myself. Any discrepancies are addressed above.     PAST MEDICAL HISTORY     Past Medical History:   Diagnosis Date    Anxiety and depression     Arthritis     Asthma     childhood only    Chronic back pain     Depression     Gastrointestinal problem     Irritable bowel syndrome     Mental disorder     PCOS (polycystic ovarian syndrome)     Urinary incontinence     stress       SURGICAL HISTORY       Past Surgical History:   Procedure Laterality Date    BACK SURGERY  [id number]    lumbar microdiscectomy x 3 -- last 2006    BACK SURGERY  2016    cervical fusion     BLADDER SUSPENSION  2013    BREAST BIOPSY Bilateral 1/16/13    U/S Guided core bx of 2 solid nodules int he right and left breast    CERVICAL FUSION N/A 10/28/2016    ACDF ANTERIOR CERVICAL DISKECTOMY FUSION C5-6 USING CC TRIAD HELIX MINI-PLATE, 1.5 Mikhail Aguilar REP  performed by Miri Spain MD at [facility]. [address] & 2007    CHOLECYSTECTOMY  2005    COLONOSCOPY      ELBOW SURGERY      pins in and out, pt broke tip of elbow    ENDOMETRIAL ABLATION  2014    ENDOSCOPY, COLON, DIAGNOSTIC      FRACTURE SURGERY  1985    fx / left elbow / pins in & then removed   Nitin Bueno  12/2018    Repair recurrent VH with mesh    HYSTERECTOMY, TOTAL ABDOMINAL  04/07/16     [address] [facility]  2001, 2005, 2006    lumbar    STIMULATOR SURGERY N/A 3/11/2021    DORSAL COLUMN STIMULATOR TRIAL performed by Phi Summers MD at [address]      at age 39   [address]  10/20/15    Luigi Chicas MD   [address] [facility] N/A 12/13/2018    With mesh       CURRENT MEDICATIONS       Discharge Medication List as of 4/13/2021  8:51 PM      CONTINUE these medications which have NOT CHANGED    Details   naltrexone-buPROPion (CONTRAVE) 8-90 MG per extended release tablet Take 2 tablets by mouth 2 times daily, Disp-120 tablet, R-2Normal      fluticasone (FLONASE) 50 MCG/ACT nasal spray 1 spray by Each Nostril route daily, Disp-1 Bottle, R-1Normal      cetirizine (ZYRTEC) 10 MG tablet Take 10 mg by mouth daily as needed Historical Med      nystatin (MYCOSTATIN) 741567 UNIT/GM powder Apply 3 times daily. , Disp-60 g, R-2, Normal      Naproxen Sodium (ALEVE) 220 MG CAPS Take by mouthHistorical Med      acetaminophen (TYLENOL) 325 MG tablet Take 650 mg by mouth every 6 hours as needed for PainHistorical Med             ALLERGIES       Allergies   Allergen Reactions    Adhesive Tape Rash    Macrobid [Nitrofurantoin Monohydrate Macrocrystals] Rash    Sulfa Antibiotics Rash       FAMILY HISTORY       Family History   Problem Relation Age of Onset    Cancer Mother 47        breast & uterine cancer    Hypertension Mother     High Blood Pressure Mother    Chantel Limon Breast Cancer Mother     Other Father         car accident at age 22    Cancer Maternal Grandmother         colon    Diabetes Paternal Grandfather     No Known Problems Sister     No Known Problems Daughter         SOCIAL HISTORY       Social History     Socioeconomic History    Marital status:      Spouse name: None    Number of children: None    Years of education: None    Highest education level: None   Occupational History    None   Social Needs    Financial resource strain: Not hard at all   [place]-Rita insecurity     Worry: Never true     Inability: Never true    Transportation needs     Medical: No     Non-medical: No   Tobacco Use    Smoking status: Former Smoker     Packs/day: 0.50     Years: 10.00     Pack years: 5.00     Types: Cigarettes     Quit date: 2004     Years since quittin.4    Smokeless tobacco: Never Used   Substance and Sexual Activity    Alcohol use: Yes     Comment: socially    Drug use: No    Sexual activity: Yes   Lifestyle    Physical activity     Days per week: None     Minutes per session: None    Stress: None   Relationships    Social connections     Talks on phone: None     Gets together: None     Attends [employer] service: None     Active member of club or organization: None     Attends meetings of clubs or organizations: None     Relationship status: None    Intimate partner violence     Fear of current or ex partner: None     Emotionally abused: None     Physically abused: None     Forced sexual activity: None   Other Topics Concern    None   Social History Narrative    None       REVIEW OF SYSTEMS     Review of Systems   Constitutional: Negative for activity change, chills, fatigue and fever. HENT: Positive for facial swelling and nosebleeds. Negative for congestion, rhinorrhea and sinus pain. Eyes: Negative for pain and discharge. Respiratory: Negative for cough, shortness of breath and wheezing. Cardiovascular: Negative for chest pain and palpitations.    Gastrointestinal: Negative for abdominal distention, abdominal pain, constipation, diarrhea, nausea and vomiting. Genitourinary: Negative for difficulty urinating, dysuria and hematuria. Musculoskeletal: Negative for joint swelling and myalgias. Skin: Negative for rash. Neurological: Negative for dizziness, weakness, light-headedness, numbness and headaches. Except as noted above the remainder of the review of systems was reviewed and is negative. SCREENINGS                        PHYSICAL EXAM    (up to 7 for level 4, 8 or more for level 5)     ED Triage Vitals   BP Temp Temp Source Pulse Resp SpO2 Height Weight   04/13/21 2017 04/13/21 2019 04/13/21 2019 04/13/21 2019 04/13/21 2019 04/13/21 2019 04/13/21 2019 04/13/21 2019   (!) 140/81 98.5 °F (36.9 °C) Oral 93 16 96 % 5' 4\" (1.626 m) 250 lb (113.4 kg)       Physical Exam  Constitutional:       General: She is not in acute distress. Appearance: Normal appearance. She is normal weight. She is not ill-appearing. HENT:      Head: Normocephalic and atraumatic. Right Ear: External ear normal.      Left Ear: External ear normal.      Nose: No congestion or rhinorrhea. Comments: Nasal bridge tenderness/swelling, no open laceration noted. No septal hematoma noted. Dried blood along the right naris  No posterior pharyngeal blood. Mouth/Throat:      Mouth: Mucous membranes are moist.      Pharynx: No oropharyngeal exudate or posterior oropharyngeal erythema. Eyes:      General:         Right eye: No discharge. Left eye: No discharge. Extraocular Movements: Extraocular movements intact. Pupils: Pupils are equal, round, and reactive to light. Neck:      Musculoskeletal: Normal range of motion and neck supple. Cardiovascular:      Rate and Rhythm: Normal rate and regular rhythm. Pulses: Normal pulses. Pulmonary:      Effort: Pulmonary effort is normal.      Breath sounds: Normal breath sounds. Abdominal:      General: Abdomen is flat. Bowel sounds are normal. There is no distension. Tenderness: There is no abdominal tenderness. There is no right CVA tenderness, left CVA tenderness, guarding or rebound. Musculoskeletal: Normal range of motion. General: No swelling or tenderness. Right lower leg: No edema. Left lower leg: No edema. Skin:     General: Skin is warm and dry. Capillary Refill: Capillary refill takes less than 2 seconds. Neurological:      General: No focal deficit present. Mental Status: She is alert. Psychiatric:         Mood and Affect: Mood normal.           DIAGNOSTIC RESULTS     EKG:(none if blank)  All EKGs are interpreted by the Emergency Department Physician who either signs or Co-signs this chart in the absence of a cardiologist.        RADIOLOGY: (none if blank)   I directly visualized the following images and reviewed the radiologist interpretations. Interpretation per the Radiologist below, if available at the time of this note:  No orders to display       LABS:  Labs Reviewed - No data to display    All other labs were within normal range or not returned as of this dictation. Please note, any cultures that may have been sent were not resulted at the time of this patient visit. EMERGENCY DEPARTMENT COURSE and Medical Decision Making:     Vitals:    Vitals:    04/13/21 2017 04/13/21 2019   BP: (!) 140/81    Pulse:  93   Resp:  16   Temp:  98.5 °F (36.9 °C)   TempSrc:  Oral   SpO2:  96%   Weight:  250 lb (113.4 kg)   Height:  5' 4\" (1.626 m)       PROCEDURES: (None if blank)  Procedures       MDM     Patient's vitals are stable. Clinically the patient does have a nasal fracture. Will give patient Norco while in the department for pain control. Did send her home with a few doses of this. Advised to take Tylenol and ibuprofen first and only to take Norco for breakthrough pain. Patient is agreeable to the plan of care.   Did give her a referral for plastic surgery in order for

## 2021-04-21 ENCOUNTER — OFFICE VISIT (OUTPATIENT)
Dept: FAMILY MEDICINE CLINIC | Age: 46
End: 2021-04-21
Payer: COMMERCIAL

## 2021-04-21 VITALS
OXYGEN SATURATION: 99 % | WEIGHT: 250 LBS | HEIGHT: 64 IN | DIASTOLIC BLOOD PRESSURE: 76 MMHG | BODY MASS INDEX: 42.68 KG/M2 | HEART RATE: 83 BPM | TEMPERATURE: 97.8 F | SYSTOLIC BLOOD PRESSURE: 128 MMHG

## 2021-04-21 DIAGNOSIS — Z01.818 PRE-OP EXAMINATION: ICD-10-CM

## 2021-04-21 DIAGNOSIS — Z01.818 PRE-OP EXAMINATION: Primary | ICD-10-CM

## 2021-04-21 PROBLEM — T24.202A SECOND DEGREE BURN OF LEFT LEG: Status: ACTIVE | Noted: 2018-09-04

## 2021-04-21 LAB
HCT VFR BLD CALC: 39.4 % (ref 37–47)
HEMOGLOBIN: 13.1 G/DL (ref 12–16)
MCH RBC QN AUTO: 29.6 PG (ref 27–31.3)
MCHC RBC AUTO-ENTMCNC: 33.1 % (ref 33–37)
MCV RBC AUTO: 89.3 FL (ref 82–100)
PDW BLD-RTO: 14.1 % (ref 11.5–14.5)
PLATELET # BLD: 325 K/UL (ref 130–400)
RBC # BLD: 4.42 M/UL (ref 4.2–5.4)
WBC # BLD: 7.8 K/UL (ref 4.8–10.8)

## 2021-04-21 PROCEDURE — G8427 DOCREV CUR MEDS BY ELIG CLIN: HCPCS | Performed by: NURSE PRACTITIONER

## 2021-04-21 PROCEDURE — G8417 CALC BMI ABV UP PARAM F/U: HCPCS | Performed by: NURSE PRACTITIONER

## 2021-04-21 PROCEDURE — 99213 OFFICE O/P EST LOW 20 MIN: CPT | Performed by: NURSE PRACTITIONER
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
## 2021-04-22 ENCOUNTER — HOSPITAL ENCOUNTER (OUTPATIENT)
Dept: LAB | Age: 46
Discharge: HOME | End: 2021-04-22
Payer: COMMERCIAL

## 2021-04-22 DIAGNOSIS — Z01.818 PRE-OP EXAMINATION: ICD-10-CM

## 2021-04-22 LAB
APTT: 35.3 SEC (ref 24.4–36.8)
INR BLD: 1
PROTHROMBIN TIME: 13.2 SEC (ref 12.3–14.9)
SARS-COV-2, PCR: NOT DETECTED

## 2021-04-22 PROCEDURE — 85730 THROMBOPLASTIN TIME PARTIAL: CPT

## 2021-04-22 PROCEDURE — 86850 RBC ANTIBODY SCREEN: CPT

## 2021-04-22 PROCEDURE — 36415 COLL VENOUS BLD VENIPUNCTURE: CPT

## 2021-04-22 PROCEDURE — 86901 BLOOD TYPING SEROLOGIC RH(D): CPT

## 2021-04-22 PROCEDURE — 86900 BLOOD TYPING SEROLOGIC ABO: CPT

## 2021-04-22 PROCEDURE — 85610 PROTHROMBIN TIME: CPT

## 2021-04-23 LAB
ABO/RH: NORMAL
ANTIBODY SCREEN: NORMAL

## 2021-04-26 ENCOUNTER — ANESTHESIA EVENT (OUTPATIENT)
Dept: OPERATING ROOM | Age: 46
End: 2021-04-26
Payer: COMMERCIAL

## 2021-04-27 ENCOUNTER — HOSPITAL ENCOUNTER (OUTPATIENT)
Age: 46
Setting detail: OUTPATIENT SURGERY
Discharge: HOME | End: 2021-04-27
Attending: NEUROLOGICAL SURGERY | Admitting: NEUROLOGICAL SURGERY
Payer: COMMERCIAL

## 2021-04-27 ENCOUNTER — ANESTHESIA (OUTPATIENT)
Dept: OPERATING ROOM | Age: 46
End: 2021-04-27
Payer: COMMERCIAL

## 2021-04-27 ENCOUNTER — APPOINTMENT (OUTPATIENT)
Dept: GENERAL RADIOLOGY | Age: 46
End: 2021-04-27
Attending: NEUROLOGICAL SURGERY
Payer: COMMERCIAL

## 2021-04-27 VITALS
TEMPERATURE: 97.4 F | HEIGHT: 64 IN | RESPIRATION RATE: 16 BRPM | DIASTOLIC BLOOD PRESSURE: 55 MMHG | WEIGHT: 250 LBS | SYSTOLIC BLOOD PRESSURE: 123 MMHG | HEART RATE: 78 BPM | BODY MASS INDEX: 42.68 KG/M2 | OXYGEN SATURATION: 100 %

## 2021-04-27 VITALS — SYSTOLIC BLOOD PRESSURE: 143 MMHG | DIASTOLIC BLOOD PRESSURE: 66 MMHG | OXYGEN SATURATION: 97 %

## 2021-04-27 DIAGNOSIS — G89.18 POSTOPERATIVE PAIN: Primary | ICD-10-CM

## 2021-04-27 PROCEDURE — 6360000002 HC RX W HCPCS: Performed by: NEUROLOGICAL SURGERY

## 2021-04-27 PROCEDURE — 2720000010 HC SURG SUPPLY STERILE: Performed by: NEUROLOGICAL SURGERY

## 2021-04-27 PROCEDURE — 3600000004 HC SURGERY LEVEL 4 BASE: Performed by: NEUROLOGICAL SURGERY

## 2021-04-27 PROCEDURE — 2500000003 HC RX 250 WO HCPCS: Performed by: ANESTHESIOLOGY

## 2021-04-27 PROCEDURE — 2500000003 HC RX 250 WO HCPCS: Performed by: NEUROLOGICAL SURGERY

## 2021-04-27 PROCEDURE — 3700000001 HC ADD 15 MINUTES (ANESTHESIA): Performed by: NEUROLOGICAL SURGERY

## 2021-04-27 PROCEDURE — 7100000011 HC PHASE II RECOVERY - ADDTL 15 MIN: Performed by: NEUROLOGICAL SURGERY

## 2021-04-27 PROCEDURE — 3700000000 HC ANESTHESIA ATTENDED CARE: Performed by: NEUROLOGICAL SURGERY

## 2021-04-27 PROCEDURE — 3209999900 FLUORO FOR SURGICAL PROCEDURES

## 2021-04-27 PROCEDURE — 7100000010 HC PHASE II RECOVERY - FIRST 15 MIN: Performed by: NEUROLOGICAL SURGERY

## 2021-04-27 PROCEDURE — C1820 GENERATOR NEURO RECHG BAT SY: HCPCS | Performed by: NEUROLOGICAL SURGERY

## 2021-04-27 PROCEDURE — 2580000003 HC RX 258: Performed by: NEUROLOGICAL SURGERY

## 2021-04-27 PROCEDURE — 6370000000 HC RX 637 (ALT 250 FOR IP): Performed by: ANESTHESIOLOGY

## 2021-04-27 PROCEDURE — C1778 LEAD, NEUROSTIMULATOR: HCPCS | Performed by: NEUROLOGICAL SURGERY

## 2021-04-27 PROCEDURE — 3600000014 HC SURGERY LEVEL 4 ADDTL 15MIN: Performed by: NEUROLOGICAL SURGERY

## 2021-04-27 PROCEDURE — 6360000002 HC RX W HCPCS: Performed by: NURSE ANESTHETIST, CERTIFIED REGISTERED

## 2021-04-27 PROCEDURE — 2580000003 HC RX 258: Performed by: ANESTHESIOLOGY

## 2021-04-27 PROCEDURE — 2709999900 HC NON-CHARGEABLE SUPPLY: Performed by: NEUROLOGICAL SURGERY

## 2021-04-27 DEVICE — DEVICE NEUROSTIMULATOR 13.9CC W1.9XH2.2IN 29.1GM RECHRG: Type: IMPLANTABLE DEVICE | Site: BUTTOCKS | Status: FUNCTIONAL

## 2021-04-27 DEVICE — KIT LD L60CM 8 ELECTRD PERC COMP CONTAIN LD ANCHR GWIRE NDL: Type: IMPLANTABLE DEVICE | Site: BACK | Status: FUNCTIONAL

## 2021-04-27 RX ORDER — PROPOFOL 10 MG/ML
INJECTION, EMULSION INTRAVENOUS CONTINUOUS PRN
Status: DISCONTINUED | OUTPATIENT
Start: 2021-04-27 | End: 2021-04-27 | Stop reason: SDUPTHER

## 2021-04-27 RX ORDER — CEPHALEXIN 500 MG/1
500 CAPSULE ORAL 3 TIMES DAILY
Qty: 21 CAPSULE | Refills: 0 | Status: SHIPPED | OUTPATIENT
Start: 2021-04-27 | End: 2021-05-04

## 2021-04-27 RX ORDER — SODIUM CHLORIDE, SODIUM LACTATE, POTASSIUM CHLORIDE, CALCIUM CHLORIDE 600; 310; 30; 20 MG/100ML; MG/100ML; MG/100ML; MG/100ML
INJECTION, SOLUTION INTRAVENOUS CONTINUOUS
Status: DISCONTINUED | OUTPATIENT
Start: 2021-04-27 | End: 2021-04-27 | Stop reason: HOSPADM

## 2021-04-27 RX ORDER — OXYCODONE HYDROCHLORIDE AND ACETAMINOPHEN 5; 325 MG/1; MG/1
1 TABLET ORAL EVERY 6 HOURS PRN
Qty: 28 TABLET | Refills: 0 | Status: SHIPPED | OUTPATIENT
Start: 2021-04-27 | End: 2021-05-11

## 2021-04-27 RX ORDER — DIPHENHYDRAMINE HYDROCHLORIDE 50 MG/ML
12.5 INJECTION INTRAMUSCULAR; INTRAVENOUS
Status: DISCONTINUED | OUTPATIENT
Start: 2021-04-27 | End: 2021-04-27 | Stop reason: HOSPADM

## 2021-04-27 RX ORDER — MIDAZOLAM HYDROCHLORIDE 2 MG/2ML
INJECTION, SOLUTION INTRAMUSCULAR; INTRAVENOUS PRN
Status: DISCONTINUED | OUTPATIENT
Start: 2021-04-27 | End: 2021-04-27 | Stop reason: SDUPTHER

## 2021-04-27 RX ORDER — FENTANYL CITRATE 50 UG/ML
50 INJECTION, SOLUTION INTRAMUSCULAR; INTRAVENOUS EVERY 10 MIN PRN
Status: DISCONTINUED | OUTPATIENT
Start: 2021-04-27 | End: 2021-04-27 | Stop reason: HOSPADM

## 2021-04-27 RX ORDER — MEPERIDINE HYDROCHLORIDE 25 MG/ML
12.5 INJECTION INTRAMUSCULAR; INTRAVENOUS; SUBCUTANEOUS EVERY 5 MIN PRN
Status: DISCONTINUED | OUTPATIENT
Start: 2021-04-27 | End: 2021-04-27 | Stop reason: HOSPADM

## 2021-04-27 RX ORDER — SODIUM CHLORIDE 0.9 % (FLUSH) 0.9 %
10 SYRINGE (ML) INJECTION EVERY 12 HOURS SCHEDULED
Status: DISCONTINUED | OUTPATIENT
Start: 2021-04-27 | End: 2021-04-27 | Stop reason: HOSPADM

## 2021-04-27 RX ORDER — FENTANYL CITRATE 50 UG/ML
INJECTION, SOLUTION INTRAMUSCULAR; INTRAVENOUS PRN
Status: DISCONTINUED | OUTPATIENT
Start: 2021-04-27 | End: 2021-04-27 | Stop reason: SDUPTHER

## 2021-04-27 RX ORDER — PROPOFOL 10 MG/ML
INJECTION, EMULSION INTRAVENOUS PRN
Status: DISCONTINUED | OUTPATIENT
Start: 2021-04-27 | End: 2021-04-27 | Stop reason: SDUPTHER

## 2021-04-27 RX ORDER — BUPIVACAINE HYDROCHLORIDE 2.5 MG/ML
INJECTION, SOLUTION EPIDURAL; INFILTRATION; INTRACAUDAL PRN
Status: DISCONTINUED | OUTPATIENT
Start: 2021-04-27 | End: 2021-04-27 | Stop reason: ALTCHOICE

## 2021-04-27 RX ORDER — CEFAZOLIN SODIUM 2 G/50ML
2000 SOLUTION INTRAVENOUS
Status: COMPLETED | OUTPATIENT
Start: 2021-04-27 | End: 2021-04-27

## 2021-04-27 RX ORDER — SODIUM CHLORIDE 9 MG/ML
25 INJECTION, SOLUTION INTRAVENOUS PRN
Status: DISCONTINUED | OUTPATIENT
Start: 2021-04-27 | End: 2021-04-27 | Stop reason: HOSPADM

## 2021-04-27 RX ORDER — ONDANSETRON 2 MG/ML
4 INJECTION INTRAMUSCULAR; INTRAVENOUS
Status: DISCONTINUED | OUTPATIENT
Start: 2021-04-27 | End: 2021-04-27 | Stop reason: HOSPADM

## 2021-04-27 RX ORDER — SODIUM CHLORIDE 0.9 % (FLUSH) 0.9 %
10 SYRINGE (ML) INJECTION PRN
Status: DISCONTINUED | OUTPATIENT
Start: 2021-04-27 | End: 2021-04-27 | Stop reason: HOSPADM

## 2021-04-27 RX ORDER — LIDOCAINE HYDROCHLORIDE 10 MG/ML
1 INJECTION, SOLUTION EPIDURAL; INFILTRATION; INTRACAUDAL; PERINEURAL
Status: COMPLETED | OUTPATIENT
Start: 2021-04-27 | End: 2021-04-27

## 2021-04-27 RX ORDER — HYDROCODONE BITARTRATE AND ACETAMINOPHEN 5; 325 MG/1; MG/1
1 TABLET ORAL PRN
Status: COMPLETED | OUTPATIENT
Start: 2021-04-27 | End: 2021-04-27

## 2021-04-27 RX ORDER — HYDROCODONE BITARTRATE AND ACETAMINOPHEN 5; 325 MG/1; MG/1
2 TABLET ORAL PRN
Status: COMPLETED | OUTPATIENT
Start: 2021-04-27 | End: 2021-04-27

## 2021-04-27 RX ORDER — METOCLOPRAMIDE HYDROCHLORIDE 5 MG/ML
10 INJECTION INTRAMUSCULAR; INTRAVENOUS
Status: DISCONTINUED | OUTPATIENT
Start: 2021-04-27 | End: 2021-04-27 | Stop reason: HOSPADM

## 2021-04-27 RX ADMIN — CEFAZOLIN SODIUM 2000 MG: 2 SOLUTION INTRAVENOUS at 07:34

## 2021-04-27 RX ADMIN — HYDROCODONE BITARTRATE AND ACETAMINOPHEN 2 TABLET: 5; 325 TABLET ORAL at 09:09

## 2021-04-27 RX ADMIN — PROPOFOL 10 MG: 10 INJECTION, EMULSION INTRAVENOUS at 08:20

## 2021-04-27 RX ADMIN — SODIUM CHLORIDE, POTASSIUM CHLORIDE, SODIUM LACTATE AND CALCIUM CHLORIDE: 600; 310; 30; 20 INJECTION, SOLUTION INTRAVENOUS at 07:29

## 2021-04-27 RX ADMIN — PROPOFOL 20 MG: 10 INJECTION, EMULSION INTRAVENOUS at 08:17

## 2021-04-27 RX ADMIN — PROPOFOL 20 MG: 10 INJECTION, EMULSION INTRAVENOUS at 08:08

## 2021-04-27 RX ADMIN — LIDOCAINE HYDROCHLORIDE 0.1 ML: 10 INJECTION, SOLUTION EPIDURAL; INFILTRATION; INTRACAUDAL; PERINEURAL at 06:39

## 2021-04-27 RX ADMIN — FENTANYL CITRATE 25 MCG: 50 INJECTION, SOLUTION INTRAMUSCULAR; INTRAVENOUS at 07:32

## 2021-04-27 RX ADMIN — PROPOFOL 10 MG: 10 INJECTION, EMULSION INTRAVENOUS at 08:18

## 2021-04-27 RX ADMIN — PROPOFOL 20 MG: 10 INJECTION, EMULSION INTRAVENOUS at 08:16

## 2021-04-27 RX ADMIN — SODIUM CHLORIDE, POTASSIUM CHLORIDE, SODIUM LACTATE AND CALCIUM CHLORIDE: 600; 310; 30; 20 INJECTION, SOLUTION INTRAVENOUS at 06:39

## 2021-04-27 RX ADMIN — FENTANYL CITRATE 25 MCG: 50 INJECTION, SOLUTION INTRAMUSCULAR; INTRAVENOUS at 07:41

## 2021-04-27 RX ADMIN — PROPOFOL 100 MCG/KG/MIN: 10 INJECTION, EMULSION INTRAVENOUS at 08:02

## 2021-04-27 RX ADMIN — FENTANYL CITRATE 25 MCG: 50 INJECTION, SOLUTION INTRAMUSCULAR; INTRAVENOUS at 07:37

## 2021-04-27 RX ADMIN — MIDAZOLAM HYDROCHLORIDE 1 MG: 1 INJECTION, SOLUTION INTRAMUSCULAR; INTRAVENOUS at 08:02

## 2021-04-27 RX ADMIN — PROPOFOL 10 MG: 10 INJECTION, EMULSION INTRAVENOUS at 08:30

## 2021-04-27 RX ADMIN — PROPOFOL 20 MG: 10 INJECTION, EMULSION INTRAVENOUS at 08:02

## 2021-04-27 RX ADMIN — MIDAZOLAM HYDROCHLORIDE 1 MG: 1 INJECTION, SOLUTION INTRAMUSCULAR; INTRAVENOUS at 07:42

## 2021-04-27 RX ADMIN — FENTANYL CITRATE 25 MCG: 50 INJECTION, SOLUTION INTRAMUSCULAR; INTRAVENOUS at 07:42

## 2021-04-27 RX ADMIN — PROPOFOL 20 MG: 10 INJECTION, EMULSION INTRAVENOUS at 08:14

## 2021-04-27 RX ADMIN — PROPOFOL 20 MG: 10 INJECTION, EMULSION INTRAVENOUS at 08:15

## 2021-04-27 RX ADMIN — PROPOFOL 10 MG: 10 INJECTION, EMULSION INTRAVENOUS at 08:27

## 2021-04-27 RX ADMIN — PROPOFOL 30 MG: 10 INJECTION, EMULSION INTRAVENOUS at 08:07

## 2021-04-27 ASSESSMENT — PULMONARY FUNCTION TESTS
PIF_VALUE: 1
PIF_VALUE: 0
PIF_VALUE: 1
PIF_VALUE: 0
PIF_VALUE: 1
PIF_VALUE: 0
PIF_VALUE: 1
PIF_VALUE: 1
PIF_VALUE: 0
PIF_VALUE: 1
PIF_VALUE: 0
PIF_VALUE: 1
PIF_VALUE: 0
PIF_VALUE: 1
PIF_VALUE: 1
PIF_VALUE: 0
PIF_VALUE: 1
PIF_VALUE: 1
PIF_VALUE: 0
PIF_VALUE: 0
PIF_VALUE: 1

## 2021-04-27 ASSESSMENT — PAIN DESCRIPTION - ORIENTATION
ORIENTATION: LEFT
ORIENTATION: LEFT

## 2021-04-27 ASSESSMENT — PAIN SCALES - GENERAL
PAINLEVEL_OUTOF10: 7
PAINLEVEL_OUTOF10: 6
PAINLEVEL_OUTOF10: 6
PAINLEVEL_OUTOF10: 7
PAINLEVEL_OUTOF10: 6

## 2021-04-27 ASSESSMENT — PAIN DESCRIPTION - PAIN TYPE
TYPE: SURGICAL PAIN

## 2021-04-27 ASSESSMENT — PAIN DESCRIPTION - LOCATION
LOCATION: BACK
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
## 2021-04-27 NOTE — BRIEF OP NOTE
Brief Postoperative Note      Patient: Navi Boucher  YOB: 1975  MRN: [id number]    Date of Procedure: 4/27/2021    Pre-Op Diagnosis: LUMBAR RADICULOPATHY, SPONDYLOSIS    Post-Op Diagnosis: Same       Procedure(s):  PERMANENT DORSAL COLUMN STIMULATOR PLACEMENT    Surgeon(s):  Gavin Avila MD    Assistant:   Assistant: Nina Davenport    Anesthesia: Monitor Anesthesia Care    Estimated Blood Loss (mL): less than 50     Complications: None    Specimens:   * No specimens in log *    Implants:  Implant Name Type Inv. Item Serial No.  Lot No. LRB No. Used Action   KIT LD L60CM 8 ELECTRD PERC COMP CONTAIN LD ANCHR GWIRE NDL  KIT LD L60CM 8 ELECTRD PERC COMP CONTAIN LD ANCHR GWIRE NDL  [employer] [id number] N/A 1 Implanted   KIT LD L60CM 8 ELECTRD PERC COMP CONTAIN LD ANCHR GWIRE NDL  KIT LD L60CM 8 ELECTRD PERC COMP CONTAIN LD ANCHR GWIRE NDL  [email or web address] [id number] N/A 1 Implanted   DEVICE NEUROSTIMULATOR 13. [address] W1.9XH2.2IN 29. 1GM RECHRG - [id number]  DEVICE NEUROSTIMULATOR 13. [address] W1.9XH2.2IN 29. 1GM RECHRG [id number] [facility]  Left 1 Implanted             Electronically signed by Tejas Vega MD on 4/27/2021 at 8:31 AM

## 2021-04-27 NOTE — H&P
partner violence       Fear of current or ex partner: Not on file       Emotionally abused: Not on file       Physically abused: Not on file       Forced sexual activity: Not on file   Other Topics Concern    Not on file   Social History Narrative    Not on file                   Current Outpatient Medications on File Prior to Visit   Medication Sig Dispense Refill    naltrexone-buPROPion (CONTRAVE) 8-90 MG per extended release tablet Take 2 tablets by mouth 2 times daily 120 tablet 2    fluticasone (FLONASE) 50 MCG/ACT nasal spray 1 spray by Each Nostril route daily 1 Bottle 1    Naproxen Sodium (ALEVE) 220 MG CAPS Take by mouth        acetaminophen (TYLENOL) 325 MG tablet Take 650 mg by mouth every 6 hours as needed for Pain        cetirizine (ZYRTEC) 10 MG tablet Take 10 mg by mouth daily as needed           No current facility-administered medications on file prior to visit.                   Review of Systems   Musculoskeletal: Positive for back pain.            Objective:      Vitals:  LMP 03/05/2016          Physical Exam  Neurological:      Mental Status: She is alert and oriented to person, place, and time. Comments: DCS lead was removed without difficulty and dressing applied. Palm sized erythema noted.             Assessment:        Diagnosis Orders   1. History of back surgery  LAMINECTOMY THORACIC / LUMBAR W/ PLACEMENT SPINAL CORD STIMULATOR   2. Lumbar spondylosis  LAMINECTOMY THORACIC / LUMBAR W/ PLACEMENT SPINAL CORD STIMULATOR   3. Scoliosis, unspecified scoliosis type, unspecified spinal region  LAMINECTOMY THORACIC / LUMBAR W/ PLACEMENT SPINAL CORD STIMULATOR   4. Lumbar radiculopathy  LAMINECTOMY THORACIC / LUMBAR W/ PLACEMENT SPINAL CORD STIMULATOR         Plan:       IMPRESSION:  Status post DCS trial with good relief. Incision site: shows palm sized erythema noted. DCS lead remove without difficulty and dressing applied. Overall, I am happy with her clinical conditions.    RECOMMENDATION:  Further instruction for activity was given.    Understanding risks and benefits of the surgery, the patient is to undergo permanent DCS placement.                           Orders Placed This Encounter   Procedures    LAMINECTOMY THORACIC / LUMBAR W/ PLACEMENT SPINAL CORD STIMULATOR       PERMANENT DCS PLACEMENT       Order Specific Question:   Pre-procedure Diagnosis       Answer:   Zeinab Campoverde MD

## 2021-04-27 NOTE — OP NOTE
Operative Note  ______________________________________________________________    Patient: Erika Barragan  YOB: 1975  MRN: [id number]  Date of Procedure: 4/27/2021    Pre-Op Diagnosis: LUMBAR RADICULOPATHY, SPONDYLOSIS, previous lumbar surgery with post laminectomy syndrome      Post-Op Diagnosis: Same       Procedure(s):  PERMANENT DORSAL COLUMN STIMULATOR PLACEMENT, placement of dorsal column stimulator electrodes x2 percutaneous, placement of dorsal column stimulator , use of fluoroscopy    Anesthesia: Monitor Anesthesia Care    Surgeon(s):  Gloria Wade MD    Staff:    First Assistant: Cory Lobato    Estimated Blood Loss: Less than 50 mL          Implants:  Implant Name Type Inv. Item Serial No.  Lot No. LRB No. Used Action   KIT LD L60CM 8 ELECTRD PERC COMP CONTAIN LD ANCHR GWIRE NDL  KIT LD L60CM 8 ELECTRD PERC COMP CONTAIN LD ANCHR GWIRE NDL  [employer] [id number] N/A 1 Implanted   KIT LD L60CM 8 ELECTRD PERC COMP CONTAIN LD ANCHR GWIRE NDL  KIT LD L60CM 8 ELECTRD PERC COMP CONTAIN LD ANCHR GWIRE NDL  [employer] [id number] N/A 1 Implanted   DEVICE NEUROSTIMULATOR 13. [address] W1.9XH2.2IN 29. 1GM RECHRG - [id number]  DEVICE NEUROSTIMULATOR 13. [address] W1.9XH2.2IN 29. 1GM RECHRG [id number] [employer]  Left 1 Implanted         Procedure:  Patient is a 79-year-old female with previous lumbar surgery with post laminae syndrome and multilevel lumbar spondylosis and changes which patient admitted for permanent DCS placement after satisfactory DCS trial.    She was given preoperative antibiotics and positioned prone over or table. Patient self positioned. Back was then cleaned with ChloraPrep and draped sterilely. Under fluoroscopy, 1% lidocaine with MAC anesthesia was performed.   Midline incision was made at L1 to level and Touhy needle was then carefully used to to get into the epidural space using glass syringe technique. 2 DCS electrodes were placed percutaneously spanning from T8-T10 with satisfactory intraoperative programming with good coverage. At this point, left buttock incision was then made for a deep pocket for the [address]. Electrode was then carefully secured with 0 Vicryl and under the skin from midline incision to the pocket in routine fashion. Electrodes were then connected to the  and the  was again secured with permanent sutures. Frequent antibiotic irrigations applied. Hemostasis was satisfactory. 2-0 and 3-0 Vicryl was used for skin closure of subcutaneous layers and Dermabond for skin. Patient tolerated procedure well.           Ashley Bennett MD   on 4/27/21 at 8:31 AM EDT

## 2021-06-30 ENCOUNTER — OFFICE VISIT (OUTPATIENT)
Dept: FAMILY MEDICINE CLINIC | Age: 46
End: 2021-06-30
Payer: COMMERCIAL

## 2021-06-30 VITALS
TEMPERATURE: 97.9 F | DIASTOLIC BLOOD PRESSURE: 84 MMHG | WEIGHT: 250 LBS | BODY MASS INDEX: 42.68 KG/M2 | HEIGHT: 64 IN | SYSTOLIC BLOOD PRESSURE: 116 MMHG | HEART RATE: 88 BPM | OXYGEN SATURATION: 97 %

## 2021-06-30 DIAGNOSIS — E66.01 CLASS 3 SEVERE OBESITY DUE TO EXCESS CALORIES WITHOUT SERIOUS COMORBIDITY WITH BODY MASS INDEX (BMI) OF 45.0 TO 49.9 IN ADULT (HCC): ICD-10-CM

## 2021-06-30 DIAGNOSIS — E28.2 POLYCYSTIC OVARY SYNDROME: Primary | ICD-10-CM

## 2021-06-30 PROCEDURE — G8427 DOCREV CUR MEDS BY ELIG CLIN: HCPCS | Performed by: NURSE PRACTITIONER

## 2021-06-30 PROCEDURE — G8417 CALC BMI ABV UP PARAM F/U: HCPCS | Performed by: NURSE PRACTITIONER

## 2021-06-30 PROCEDURE — 1036F TOBACCO NON-USER: CPT | Performed by: NURSE PRACTITIONER

## 2021-06-30 PROCEDURE — 99213 OFFICE O/P EST LOW 20 MIN: CPT | Performed by: NURSE PRACTITIONER

## 2021-06-30 SDOH — ECONOMIC STABILITY: FOOD INSECURITY: WITHIN THE PAST 12 MONTHS, THE FOOD YOU BOUGHT JUST DIDN'T LAST AND YOU DIDN'T HAVE MONEY TO GET MORE.: NEVER TRUE

## 2021-06-30 SDOH — ECONOMIC STABILITY: FOOD INSECURITY: WITHIN THE PAST 12 MONTHS, YOU WORRIED THAT YOUR FOOD WOULD RUN OUT BEFORE YOU GOT MONEY TO BUY MORE.: NEVER TRUE

## 2021-06-30 ASSESSMENT — ENCOUNTER SYMPTOMS
SHORTNESS OF BREATH: 0
COUGH: 0
DIARRHEA: 0
CONSTIPATION: 0

## 2021-06-30 ASSESSMENT — SOCIAL DETERMINANTS OF HEALTH (SDOH): HOW HARD IS IT FOR YOU TO PAY FOR THE VERY BASICS LIKE FOOD, HOUSING, MEDICAL CARE, AND HEATING?: NOT HARD AT ALL
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
## 2021-09-30 ENCOUNTER — OFFICE VISIT (OUTPATIENT)
Dept: FAMILY MEDICINE CLINIC | Age: 46
End: 2021-09-30
Payer: COMMERCIAL

## 2021-09-30 VITALS
SYSTOLIC BLOOD PRESSURE: 124 MMHG | DIASTOLIC BLOOD PRESSURE: 86 MMHG | HEIGHT: 64 IN | TEMPERATURE: 98.2 F | HEART RATE: 78 BPM | BODY MASS INDEX: 39.27 KG/M2 | OXYGEN SATURATION: 98 % | WEIGHT: 230 LBS

## 2021-09-30 DIAGNOSIS — Z12.11 COLON CANCER SCREENING: ICD-10-CM

## 2021-09-30 DIAGNOSIS — Z00.00 WELL ADULT EXAM: Primary | ICD-10-CM

## 2021-09-30 DIAGNOSIS — Z12.31 ENCOUNTER FOR SCREENING MAMMOGRAM FOR MALIGNANT NEOPLASM OF BREAST: ICD-10-CM

## 2021-09-30 DIAGNOSIS — Z13.220 LIPID SCREENING: ICD-10-CM

## 2021-09-30 DIAGNOSIS — E66.09 CLASS 2 OBESITY DUE TO EXCESS CALORIES WITHOUT SERIOUS COMORBIDITY WITH BODY MASS INDEX (BMI) OF 39.0 TO 39.9 IN ADULT: ICD-10-CM

## 2021-09-30 DIAGNOSIS — Z13.1 DIABETES MELLITUS SCREENING: ICD-10-CM

## 2021-09-30 PROCEDURE — 99396 PREV VISIT EST AGE 40-64: CPT | Performed by: NURSE PRACTITIONER

## 2021-09-30 ASSESSMENT — ENCOUNTER SYMPTOMS
COUGH: 0
DIARRHEA: 0
SHORTNESS OF BREATH: 0
CONSTIPATION: 0
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
## 2021-10-04 ENCOUNTER — TELEPHONE (OUTPATIENT)
Dept: FAMILY MEDICINE CLINIC | Age: 46
End: 2021-10-04

## 2021-10-20 ENCOUNTER — HOSPITAL ENCOUNTER (OUTPATIENT)
Dept: WOMENS IMAGING | Age: 46
Discharge: HOME | End: 2021-10-22
Payer: COMMERCIAL

## 2021-10-20 DIAGNOSIS — Z12.31 ENCOUNTER FOR SCREENING MAMMOGRAM FOR MALIGNANT NEOPLASM OF BREAST: ICD-10-CM

## 2021-10-20 PROCEDURE — 77063 BREAST TOMOSYNTHESIS BI: CPT

## 2021-10-20 PROCEDURE — 77067 SCR MAMMO BI INCL CAD: CPT

## 2021-10-28 DIAGNOSIS — Z12.11 COLON CANCER SCREENING: ICD-10-CM

## 2021-11-11 ENCOUNTER — OFFICE VISIT (OUTPATIENT)
Dept: PAIN MANAGEMENT | Age: 46
End: 2021-11-11
Payer: COMMERCIAL

## 2021-11-11 VITALS
WEIGHT: 223 LBS | HEIGHT: 64 IN | DIASTOLIC BLOOD PRESSURE: 74 MMHG | SYSTOLIC BLOOD PRESSURE: 120 MMHG | TEMPERATURE: 97.5 F | BODY MASS INDEX: 38.07 KG/M2

## 2021-11-11 DIAGNOSIS — M54.16 LUMBAR RADICULOPATHY: Primary | ICD-10-CM

## 2021-11-11 DIAGNOSIS — M54.6 RIGHT-SIDED THORACIC BACK PAIN, UNSPECIFIED CHRONICITY: ICD-10-CM

## 2021-11-11 PROCEDURE — G8427 DOCREV CUR MEDS BY ELIG CLIN: HCPCS | Performed by: NURSE PRACTITIONER

## 2021-11-11 PROCEDURE — 99214 OFFICE O/P EST MOD 30 MIN: CPT | Performed by: NURSE PRACTITIONER

## 2021-11-11 PROCEDURE — G8417 CALC BMI ABV UP PARAM F/U: HCPCS | Performed by: NURSE PRACTITIONER

## 2021-11-11 PROCEDURE — 1036F TOBACCO NON-USER: CPT | Performed by: NURSE PRACTITIONER

## 2021-11-11 PROCEDURE — G8484 FLU IMMUNIZE NO ADMIN: HCPCS | Performed by: NURSE PRACTITIONER

## 2021-11-11 ASSESSMENT — ENCOUNTER SYMPTOMS
BACK PAIN: 1
DIARRHEA: 0
RESPIRATORY NEGATIVE: 1
CONSTIPATION: 0

## 2021-11-11 NOTE — PROGRESS NOTES
Patient: Senia Camara  YOB: 1975  Date: 11/11/21        Subjective:     Senia Camara is a 51 y.o. female who complains today of:    Chief Complaint   Patient presents with    Back Pain         Allergies:  Adhesive tape, Macrobid [nitrofurantoin monohydrate macrocrystals], and Sulfa antibiotics    Past Medical History:   Diagnosis Date    Anxiety and depression     Arthritis     Asthma     childhood only    Chronic back pain     Depression     Gastrointestinal problem     Irritable bowel syndrome     Mental disorder     PCOS (polycystic ovarian syndrome)     Urinary incontinence     stress     Past Surgical History:   Procedure Laterality Date    BACK SURGERY  8110,[id number],8890    lumbar microdiscectomy x 3 -- last 2006    BACK SURGERY  2016    cervical fusion     BLADDER SUSPENSION  2013    BREAST BIOPSY Bilateral 1/16/13    U/S Guided core bx of 2 solid nodules int he right and left breast    CERVICAL FUSION N/A 10/28/2016    ACDF ANTERIOR CERVICAL DISKECTOMY FUSION C5-6 USING CC TRIAD HELIX MINI-PLATE, 1.5 HRS, Collin Cogan REP  performed by Bianca Hernadez MD at [facility]. [address] & 2007    CHOLECYSTECTOMY  2005    COLONOSCOPY      ELBOW SURGERY      pins in and out, pt broke tip of elbow    ENDOMETRIAL ABLATION  2014    ENDOSCOPY, COLON, DIAGNOSTIC      FRACTURE SURGERY  1985    fx / left elbow / pins in & then removed   [facility]  12/2018    Repair recurrent VH with mesh    HYSTERECTOMY, TOTAL ABDOMINAL  04/07/16    DR RAYA    NERVE SURGERY N/A 4/27/2021    PERMANENT DORSAL COLUMN STIMULATOR PLACEMENT performed by Girma Padilla MD at [facility]  2001, 2005, 2006    lumbar    STIMULATOR SURGERY N/A 3/11/2021    DORSAL COLUMN STIMULATOR TRIAL performed by Girma Padilla MD at [facility]      at age 39   [facility]  10/20/15    Heidi Calero MD   [address] [place] [address] 2018    With mesh     Family History   Problem Relation Age of Onset   Dacia Gaines Cancer Mother 47        breast & uterine cancer    Hypertension Mother     High Blood Pressure Mother    Dacia Gaines Breast Cancer Mother     Other Father         car accident at age 22    Cancer Maternal Grandmother         colon    Diabetes Paternal Grandfather     No Known Problems Sister     No Known Problems Daughter      Social History     Socioeconomic History    Marital status:      Spouse name: Not on file    Number of children: Not on file    Years of education: Not on file    Highest education level: Not on file   Occupational History    Not on file   Tobacco Use    Smoking status: Former Smoker     Packs/day: 0.50     Years: 10.00     Pack years: 5.00     Types: Cigarettes     Quit date: 2004     Years since quittin.0    Smokeless tobacco: Never Used   Vaping Use    Vaping Use: Never used   Substance and Sexual Activity    Alcohol use: Yes     Comment: socially    Drug use: No    Sexual activity: Yes   Other Topics Concern    Not on file   Social History Narrative    Not on file     Social Determinants of Health     Financial Resource Strain: Low Risk     Difficulty of Paying Living Expenses: Not hard at all   Food Insecurity: No Food Insecurity    Worried About [facility] in the Last Year: Never true    [facility] in the Last Year: Never true   Transportation Needs: No Transportation Needs    Lack of Transportation (Medical): No    Lack of Transportation (Non-Medical):  No   Physical Activity:     Days of Exercise per Week: Not on file    Minutes of Exercise per Session: Not on file   Stress:     Feeling of Stress : Not on file   Social Connections:     Frequency of Communication with Friends and Family: Not on file    Frequency of Social Gatherings with Friends and Family: Not on file    Attends [employer] Services: Not on file    Active Member of Clubs or Organizations: Not on file    Attends Club or Organization Meetings: Not on file    Marital Status: Not on file   Intimate Partner Violence:     Fear of Current or Ex-Partner: Not on file    Emotionally Abused: Not on file    Physically Abused: Not on file    Sexually Abused: Not on file   Housing Stability:     Unable to Pay for Housing in the Last Year: Not on file    Number of [place]jose in the Last Year: Not on file    Unstable Housing in the Last Year: Not on file       Current Outpatient Medications on File Prior to Visit   Medication Sig Dispense Refill    naltrexone-buPROPion (CONTRAVE) 8-90 MG per extended release tablet Take 2 tablets by mouth 2 times daily 120 tablet 2    fluticasone (FLONASE) 50 MCG/ACT nasal spray 1 spray by Each Nostril route daily 1 Bottle 1    acetaminophen (TYLENOL) 325 MG tablet Take 650 mg by mouth every 6 hours as needed for Pain      cetirizine (ZYRTEC) 10 MG tablet Take 10 mg by mouth daily as needed        No current facility-administered medications on file prior to visit. 48-year-old female here today to follow-up after spinal cord stimulator placement with Dr. Sandra Voss on 4/27/2021. She has been doing well, her pain today is 2/10. She has however been having some pain going down her left buttock and her left thigh for the past several months. She works in a nursing home. After being on her feet all day the pain gets worse. She is on over-the-counter Tylenol and ibuprofen. She also complaining of right upper back pain for couple years but worsening over the last 2 months. She would like to have any injections that could help her. She is a previous patient of Dr. Roseann Rowan who did epidurals and radiofrequency ablation prior to her surgery. DCS placement 4-27-21. History of three back surgeries with most recent one in 2006.     History of ACDF 10-28-16 by Dr. Edith Glover    Back Pain  Pertinent negatives include no headaches, numbness or weakness.        Review of Systems   Constitutional: Negative. Negative for activity change and unexpected weight change. HENT: Negative. Negative for hearing loss. Respiratory: Negative. Cardiovascular: Negative for leg swelling. Gastrointestinal: Negative for constipation and diarrhea. Genitourinary: Negative. Musculoskeletal: Positive for back pain. Negative for gait problem, joint swelling and neck pain. Thoracic   Skin: Negative for rash. Neurological: Negative for dizziness, weakness, numbness and headaches. Psychiatric/Behavioral: Negative. Negative for sleep disturbance. Objective:     Vitals:  /74   Temp 97.5 °F (36.4 °C) (Infrared)   Ht 5' 4\" (1.626 m)   Wt 223 lb (101.2 kg)   LMP 03/05/2016   BMI 38.28 kg/m² Pain Score:   3    Physical Exam  Vitals reviewed. Constitutional:       Appearance: She is well-developed. HENT:      Head: Normocephalic. Nose: Nose normal.   Pulmonary:      Effort: Pulmonary effort is normal.   Musculoskeletal:      Cervical back: Normal range of motion and neck supple. Thoracic back: Spasms and tenderness present. Lumbar back: Tenderness present. Decreased range of motion. Positive left straight leg raise test. Negative right straight leg raise test.        Back:       Comments: Surgical scar present. Spinal cord stimulator battery left low back. Lymphadenopathy:      Cervical: No cervical adenopathy. Skin:     General: Skin is warm and dry. Findings: No erythema or rash. Neurological:      Mental Status: She is alert and oriented to person, place, and time. Cranial Nerves: No cranial nerve deficit. Deep Tendon Reflexes: Reflexes are normal and symmetric. Reflexes normal.   Psychiatric:         Mood and Affect: Mood normal.         Behavior: Behavior normal.         Thought Content: Thought content normal.         Judgment: Judgment normal.            Assessment:        Diagnosis Orders   1.  Lumbar radiculopathy  FL NJX AA&/STRD TFRML EPI LUMBAR/SACRAL 1 LEVEL   2. Right-sided thoracic back pain, unspecified chronicity  XR THORACIC SPINE (2 VIEWS)       Plan:          No orders of the defined types were placed in this encounter. Orders Placed This Encounter   Procedures    XR THORACIC SPINE (2 VIEWS)     Standing Status:   Future     Standing Expiration Date:   2/9/2022     Order Specific Question:   Reason for exam:     Answer:   pain, eval for facet arhtropathy    HI NJX AA&/STRD TFRML EPI LUMBAR/SACRAL 1 LEVEL     MARSHA left L4-5 with SK     Standing Status:   Future     Standing Expiration Date:   2/9/2022     MARSHA left L4-5 for leg pain with Dr Peraza Player; she is not on asa or blood thinners, no DM    We will go ahead and get a thoracic spine x-ray to evaluate for facet arthropathy  May consider lumbar and or thoracic facet injections if needed in the future. Follow up:  Return in about 4 weeks (around 12/9/2021) for f/u after procedure and reassess pain/medications.     Geraldine Osler, APRN - CNP

## 2021-11-12 ENCOUNTER — TELEPHONE (OUTPATIENT)
Dept: PAIN MANAGEMENT | Age: 46
End: 2021-11-12

## 2021-11-12 NOTE — TELEPHONE ENCOUNTER
LEFT L4-5 MARSHA    NO AUTH REQUIRED    OK to schedule procedure approved as above. Please note sides/levels approved and date range.    (If applicable, sides/levels approved may differ from those ordered)     [address]

## 2021-11-12 NOTE — TELEPHONE ENCOUNTER
ORDER PLACED:    Date: 11/11/21  Description: LEFT L4-5 MARSHA  Order Number: [id number]  Ordering Provider: Love Marie  Performing Provider: [facility]  CPT Codes: 66369  ICD10 Codes: M54.16

## 2021-11-15 ENCOUNTER — TELEPHONE (OUTPATIENT)
Dept: PAIN MANAGEMENT | Age: 46
End: 2021-11-15

## 2021-11-15 DIAGNOSIS — M54.6 RIGHT-SIDED THORACIC BACK PAIN, UNSPECIFIED CHRONICITY: ICD-10-CM

## 2021-11-23 ENCOUNTER — PROCEDURE VISIT (OUTPATIENT)
Dept: PAIN MANAGEMENT | Age: 46
End: 2021-11-23
Payer: COMMERCIAL

## 2021-11-23 DIAGNOSIS — M54.16 LUMBAR RADICULOPATHY: ICD-10-CM

## 2021-11-23 PROCEDURE — 64483 NJX AA&/STRD TFRM EPI L/S 1: CPT | Performed by: PAIN MEDICINE

## 2021-11-23 RX ORDER — SODIUM CHLORIDE 9 MG/ML
10 INJECTION INTRAVENOUS ONCE
Status: COMPLETED | OUTPATIENT
Start: 2021-11-23 | End: 2021-11-23

## 2021-11-23 RX ORDER — DEXAMETHASONE SODIUM PHOSPHATE 10 MG/ML
10 INJECTION, EMULSION INTRAMUSCULAR; INTRAVENOUS ONCE
Status: COMPLETED | OUTPATIENT
Start: 2021-11-23 | End: 2021-11-23

## 2021-11-23 RX ADMIN — SODIUM CHLORIDE 10 ML: 9 INJECTION INTRAVENOUS at 08:08

## 2021-11-23 RX ADMIN — DEXAMETHASONE SODIUM PHOSPHATE 10 MG: 10 INJECTION, EMULSION INTRAMUSCULAR; INTRAVENOUS at 08:08

## 2021-11-23 NOTE — PROGRESS NOTES
[facility].  Spine Surgery  Advanced Pain Management      Patient Name: Senia Camara : 1975  Date: 2021   Physician: Rosalind Mcmahon DO      Senia Camara  is here today for interventional pain management. Preoperatively, the patient presents with radicular pain in the affected area as per history and exam. Patient has failed NSAIDs, PT, and conservative treatment. Patient has significant psychological and functional impairment due to this condition. Standard ASI guidelines were followed and sterile technique used. Area was cleaned with Betadine x3. Informed consent was obtained. Fluoroscopic guidance was used for this procedure. TRANSFORAMINAL EPIDURAL  There was limited spread of contrast in the anterior epidural space and around the exiting nerve root. The 6 oclock position of the pedicle was identified. Multiple views of fluoroscopy including lateral were used to confirm accurate needle placement of depth. Live fluoroscopy was used when injecting contrast to ensure no subdural or vascular spread. In total, approximately 5 mg of Dexamethasone and 1.0 ml of 0.9cc of normal saline was injected slowly without difficulty. Patient tolerated the procedure well, no obvious complications occurred during the procedure. Patient was appropriately monitored and discharged home in stable condition with their usual motor strength. Post Op instructions were given to patient. Patient will resume blood thinners after procedure if they stopped them before procedure. Relevant imaging was reviewed with patient. [] Bilateral [] T12-L1 [] L3-4        [] L1-2 [x] L4-5       [] Right [] L2-3 [] L5-S1                [x] Left                  Rosalind Mcmahon DO      [address], [facility] [address]  Phone [phone]/QPD [email or web address]

## 2021-12-21 ENCOUNTER — VIRTUAL VISIT (OUTPATIENT)
Dept: NEUROSURGERY | Age: 46
End: 2021-12-21
Payer: COMMERCIAL

## 2021-12-21 DIAGNOSIS — Z98.890 HISTORY OF BACK SURGERY: ICD-10-CM

## 2021-12-21 DIAGNOSIS — M47.814 THORACIC ARTHRITIS: ICD-10-CM

## 2021-12-21 DIAGNOSIS — M54.16 LUMBAR RADICULOPATHY: Primary | ICD-10-CM

## 2021-12-21 PROCEDURE — 99442 PR PHYS/QHP TELEPHONE EVALUATION 11-20 MIN: CPT | Performed by: NURSE PRACTITIONER

## 2021-12-21 RX ORDER — METHYLPREDNISOLONE 4 MG/1
TABLET ORAL
Qty: 1 KIT | Refills: 0 | Status: SHIPPED | OUTPATIENT
Start: 2021-12-21 | End: 2021-12-27

## 2021-12-21 ASSESSMENT — ENCOUNTER SYMPTOMS
RESPIRATORY NEGATIVE: 1
CONSTIPATION: 0
DIARRHEA: 0
BACK PAIN: 1

## 2021-12-21 NOTE — PROGRESS NOTES
Patient Name: Nayana Mendez : 1975        Date: 2021      Type of Appt: Consult    Reason for appt: Venkatesh Carey, DORIS, BACK AND LEG PAIN    Previously seen by Dr. Rafael Irvin 21    Studies done: 21 T/S X-ray @ [facility], 2020 L/S MRI @ [facility]    Surgeries: Dorsal column stimulator placement 21 by Dr. Rafael Irvin  3 back surgeries, most recent 2006  10-28-16 ACDF ANTERIOR CERVICAL DISKECTOMY FUSION C5-6 USING [facility] [address]     Physical therapy: therapy done at [facility][1] for thoracic & shoulder    Conservative Tx tried: Pain management injections with Dr. Jamarcus Carrera, Tylenol, Ibuprofen, Medrol Dosepak    REVIEW OF SYSTEMS:    Sleep Disturbance, Neck Pain, Back Pain 12 point review of systems is otherwise negative                        [facility] Physicians  Neurosurgery and Pain [address] , [address] [facility] 82: [phone]  F: [phone]          Patient:  Nayana Mendez  YOB: 1975  Date: 2021    The patient is a 51 y.o. female who presents today for evaluation of the following problems:     Chief Complaint   Patient presents with    Back Pain        Referred by MAURICIO Jaimes - *               Estimated body mass index is 38.28 kg/m² as calculated from the following:    Height as of this encounter: 5' 4\" (1.626 m). Weight as of this encounter: 223 lb (101.2 kg).     PAST MEDICAL, FAMILY AND SOCIAL HISTORY:  Past Medical History:   Diagnosis Date    Anxiety and depression     Arthritis     Asthma     childhood only    Chronic back pain     Depression     Gastrointestinal problem     Irritable bowel syndrome     Mental disorder     PCOS (polycystic ovarian syndrome)     Urinary incontinence     stress     Past Surgical History:   Procedure Laterality Date    BACK SURGERY  [id number]    lumbar microdiscectomy x 3 -- last     BACK SURGERY  2016    cervical fusion     BLADDER SUSPENSION  2013    BREAST BIOPSY Bilateral 1/16/13    U/S Guided core bx of 2 solid nodules int he right and left breast    CERVICAL FUSION N/A 10/28/2016    ACDF ANTERIOR CERVICAL DISKECTOMY FUSION C5-6 USING CC TRIAD HELIX MINI-PLATE, 1.5 HRS, Griselda Skates REP  performed by Shantel Cherry MD at [facility]. [address] & 2007    CHOLECYSTECTOMY  2005    COLONOSCOPY      ELBOW SURGERY      pins in and out, pt broke tip of elbow    ENDOMETRIAL ABLATION  2014    ENDOSCOPY, COLON, DIAGNOSTIC      FRACTURE SURGERY  1985    fx / left elbow / pins in & then removed   [facility]  12/2018    Repair recurrent VH with mesh    HYSTERECTOMY, TOTAL ABDOMINAL  04/07/16    DR RAYA    NERVE SURGERY N/A 4/27/2021    PERMANENT DORSAL COLUMN STIMULATOR PLACEMENT performed by Jolanta Marx MD at [facility]  2001, 2005, 2006    lumbar    STIMULATOR SURGERY N/A 3/11/2021    DORSAL COLUMN STIMULATOR TRIAL performed by Jolanta Marx MD at [facility]      at age 39   [address]  10/20/15    Jacques Salinas MD   [facility] N/A 12/13/2018    With mesh     Family History   Problem Relation Age of Onset    Cancer Mother 47        breast & uterine cancer    Hypertension Mother     High Blood Pressure Mother     Breast Cancer Mother     Other Father         car accident at age 22    Cancer Maternal Grandmother         colon    Diabetes Paternal Grandfather     No Known Problems Sister     No Known Problems Daughter      Current Outpatient Medications on File Prior to Visit   Medication Sig Dispense Refill    methylPREDNISolone (MEDROL DOSEPACK) 4 MG tablet Take by mouth.  1 kit 0    naltrexone-buPROPion (CONTRAVE) 8-90 MG per extended release tablet Take 2 tablets by mouth 2 times daily 120 tablet 2    fluticasone (FLONASE) 50 MCG/ACT nasal spray 1 spray by Each Nostril route daily 1 Bottle 1    acetaminophen (TYLENOL) 325 MG tablet Take 650 mg by mouth every 6 hours as needed for Pain      cetirizine (ZYRTEC) 10 MG tablet Take 10 mg by mouth daily as needed        No current facility-administered medications on file prior to visit. Social History     Tobacco Use    Smoking status: Former Smoker     Packs/day: 0.50     Years: 10.00     Pack years: 5.00     Types: Cigarettes     Quit date: 2004     Years since quittin.1    Smokeless tobacco: Never Used   Vaping Use    Vaping Use: Never used   Substance Use Topics    Alcohol use: Yes     Comment: socially    Drug use: No       ALLERGIES  Allergies   Allergen Reactions    Adhesive Tape Rash    Macrobid [Nitrofurantoin Monohydrate Macrocrystals] Rash    Sulfa Antibiotics Rash         I have personally reviewed the PMH, PSH, family history, home medications, social history, allergies, ROS. Physical Exam:      Vitals:    21 1322   BP: 114/76   Site: Right Upper Arm   Temp: 96.8 °F (36 °C)   TempSrc: Temporal   Weight: 223 lb (101.2 kg)   Height: 5' 4\" (1.626 m)       Physical Exam:  Vitals and notes reviewed. Constitutional:  See above height, weight, pulse rate, and blood pressure. BMI      Appearance: Normal appearance. Head:      Normocephalic and atraumatic. Ears, Nose, Mouth, and Throat:      Normal shape, no discharge, deglutition intact  Eyes:      Extraocular Movements: Extraocular movements intact. Pupils: Pupils are equal, round, and reactive to light. Cardiovascular:      Pulses: Normal radialis pulses. Heart sounds: Normal heart sounds, regular rate, no rubs or murmurs. Respiratory:      lungs clear to auscultation  Abdomen:        Soft to palpation. Bowel sounds present. Genitourinary:      Normal for sex  Musculoskeletal:      Gait: Regular walk Intact. Toe walking to walk shows give way strength 4/5 left lower extremity. Numbness both lower extremities normal on the right objectively and decreased on the left.  Deep tendon reflexes absent left knee and ankle. General: Normal range of motion. Extremities well developed and symmetric. Muscle tone normal with no spasticity or fasciculations. Skin:     General: Skin is warm and dry. Capillary Refill: Capillary refill less than 2 seconds. Neurological:      Mental Status: Alert and oriented to person, place, and time. Cranial nerves individually tested 2 through 12 normal  Hematologic/Lymphatic/Immunologic:      Negative for lymphadenopathy, hematomas. Psychiatric:         Mood and Affect: Mood normal. Appropriate affect    I have reviewed all laboratory studies, reports, data, and pertinent images. 11/11/2021 x-ray thoracic spine noms      NOM[facility] uses the name Erica Zafar, not Johnny Liter which is correct    12/28/2021 x-ray lumbar spine  EXAMINATION: XR LUMBAR SPINE (MIN 4 VIEWS)       CLINICAL HISTORY: LUMBAR RADICULOPATHY. HISTORY OF BACK SURGERY.       COMPARISONS: OCTOBER 28, 2020.       FINDINGS: Lumbar vertebral bodies normal in height. 1.5 mm anterolisthesis L4 on L5 in neutral, flexion, and extension. Diffuse disc space narrowing, L3-4 and L5-S1. Increased facet sclerosis L2-L5. Neural stimulator within left posterior pelvic soft    tissues, with no stimulating wires entering central canal at T12-L1 level. Tip of neural stimulating wires identified at base of T8. No fracture. No bone lesion. Surgical clips gallbladder fossa.           Impression   MULTILEVEL DEGENERATIVE CHANGE, LUMBAR SPINE WITH 1.5 MM L4 ANTEROLISTHESIS ON FLEXION, EXTENSION, AND NEUTRAL POSITION. HISTORY OF PRESENT ILLNESS:     DCS placement 4-27-21. History of three back surgeries with most recent one in 2006.     History of ACDF 10-28-16 by Dr. Edith Glover.     5 months patient's had progressive increasing back and left lower extremity pain. The some on the right side but not significant. On the right from the hip to the knee but can be managed.   The pain on the left side is increasingly worse interfering with her ability to walk. She can't stand more than 20 minutes before the pain becomes so severe she has to sit down or lay down. This is a new pain for her. Patient is undergone full course of medications anti-inflammatory medications physical therapy pain management modalities.     Lumbar spine lateral flexion-extension 12/28/2021 does not show any evidence of instability  MRI lumbar spine pending    Arlin Valiente MD

## 2021-12-21 NOTE — PROGRESS NOTES
Senia Camara  (1975)    12/21/2021    TELEHEALTH EVALUATION -- Audio Only (During WNOSG-68 public health emergency)    Due to COVID 19 outbreak, patient's office visit was converted to a virtual visit. Patient was contacted and agreed to proceed with a audio only telehealth service. Patient understands that this encounter is a billable visit and that insurance coverage and co-pays are up to their individual insurance plans. At the beginning of this telehealth encounter, I verified with the patient their name and date of birth. Verbal consent for telehealth encounter was obtained. Subjective:       Chief Complaint   Patient presents with    Back Pain       Senia Camara is 51 y.o. female who complains today of:          Allergies:  Adhesive tape, Macrobid [nitrofurantoin monohydrate macrocrystals], and Sulfa antibiotics    History:  Past Medical History:   Diagnosis Date    Anxiety and depression     Arthritis     Asthma     childhood only    Chronic back pain     Depression     Gastrointestinal problem     Irritable bowel syndrome     Mental disorder     PCOS (polycystic ovarian syndrome)     Urinary incontinence     stress     Past Surgical History:   Procedure Laterality Date    BACK SURGERY  7780,[id number],9014    lumbar microdiscectomy x 3 -- last 2006    BACK SURGERY  2016    cervical fusion     BLADDER SUSPENSION  2013    BREAST BIOPSY Bilateral 1/16/13    U/S Guided core bx of 2 solid nodules int he right and left breast    CERVICAL FUSION N/A 10/28/2016    ACDF ANTERIOR CERVICAL DISKECTOMY FUSION C5-6 USING CC TRIAD HELIX MINI-PLATE, 1.5 HRS, Gildardo Cogan REP  per[facility] [place] [facility] & 2007    CHOLECYSTECTOMY  2005    COLONOSCOPY      ELBOW SURGERY      pins in and out, pt broke tip of elbow    ENDOMETRIAL ABLATION  2014    ENDOSCOPY, COLON, DIAGNOSTIC      FRACTURE SURGERY  1985    fx / left elbow / pins in & then removed    Needs: No Transportation Needs    Lack of Transportation (Medical): No    Lack of Transportation (Non-Medical): No   Physical Activity:     Days of Exercise per Week: Not on file    Minutes of Exercise per Session: Not on file   Stress:     Feeling of Stress : Not on file   Social Connections:     Frequency of Communication with Friends and Family: Not on file    Frequency of Social Gatherings with Friends and Family: Not on file    Attends Mandaen Services: Not on file    Active Member of [address] [place] [address] or Organizations: Not on file    Attends Club or Organization Meetings: Not on file    Marital Status: Not on file   Intimate Partner Violence:     Fear of Current or Ex-Partner: Not on file    Emotionally Abused: Not on file    Physically Abused: Not on file    Sexually Abused: Not on file   Housing Stability:     Unable to Pay for Housing in the Last Year: Not on file    Number of [place] in the Last Year: Not on file    Unstable Housing in the Last Year: Not on file       Current Outpatient Medications on File Prior to Visit   Medication Sig Dispense Refill    naltrexone-buPROPion (CONTRAVE) 8-90 MG per extended release tablet Take 2 tablets by mouth 2 times daily 120 tablet 2    fluticasone (FLONASE) 50 MCG/ACT nasal spray 1 spray by Each Nostril route daily 1 Bottle 1    acetaminophen (TYLENOL) 325 MG tablet Take 650 mg by mouth every 6 hours as needed for Pain      cetirizine (ZYRTEC) 10 MG tablet Take 10 mg by mouth daily as needed        No current facility-administered medications on file prior to visit. She has tried physical therapy. Date of lastof last PT treatment: 1/2021 [facility]    79-year-old female here today to follow-up after spinal cord stimulator placement with Dr. Corona Monterroso on 4/27/2021.  11/23/2021 epidural left L4-5 stopped pain for one week then returned. She was having left leg pain last month and now it starting to go down to her right to her knee.   Left leg pain began Plan:          Orders Placed This Encounter   Medications    methylPREDNISolone (MEDROL DOSEPACK) 4 MG tablet     Sig: Take by mouth. Dispense:  1 kit     Refill:  0       Orders Placed This Encounter   Procedures   Guanaco Bennett MD, Neurosurgery, [place]     Referral Priority:   Routine     Referral Type:   Eval and Treat     Referral Reason:   Specialty Services Required     Referred to Provider:   Tessie Green MD     Requested Specialty:   Neurosurgery     Number of Visits Requested:   1    EMG     Standing Status:   Future     Standing Expiration Date:   3/21/2022     Order Specific Question:   Which body part? Answer:   Bilateral lower extremities     -EMG lower extremities to evaluate leg pain  -Send Medrol dose pack for increase in pain  -She has previously failed epidural injection and physical therapy  -We will refer her to Dr. Leif Zhang neurosurgery to review MRI done 12/7/2021  -X-ray thoracic spine with diffuse arthritis  -Follow-up in office next visit to evaluate for any further pain management procedures    Follow up:  Return for f/u after procedure and reassess pain/medications, EMG Internal.    Darylene Glenn, APRN - CNP      >50% of 15 minute appointment was spent with discussion, addressing questions and concerns, including prognosis, treatment options, patient education, and recommendations. [id number]}    Pursuant to the emergency declaration under the [facility], [facility]5 waiver authority and the [facility] and [employer] Act, this Virtual  Visit was conducted, with patient's consent, to reduce the patient's risk of exposure to COVID-19 and provide continuity of care for an established patient. Services were provided through an audio discussion to substitute for in-person clinic visit.

## 2021-12-22 ENCOUNTER — INITIAL CONSULT (OUTPATIENT)
Dept: NEUROSURGERY | Age: 46
End: 2021-12-22
Payer: COMMERCIAL

## 2021-12-22 VITALS
BODY MASS INDEX: 38.07 KG/M2 | SYSTOLIC BLOOD PRESSURE: 114 MMHG | DIASTOLIC BLOOD PRESSURE: 76 MMHG | TEMPERATURE: 96.8 F | HEIGHT: 64 IN | WEIGHT: 223 LBS

## 2021-12-22 DIAGNOSIS — M54.16 LUMBAR RADICULOPATHY: Primary | ICD-10-CM

## 2021-12-22 DIAGNOSIS — Z98.890 HISTORY OF BACK SURGERY: ICD-10-CM

## 2021-12-22 PROCEDURE — G8417 CALC BMI ABV UP PARAM F/U: HCPCS | Performed by: NEUROLOGICAL SURGERY

## 2021-12-22 PROCEDURE — 99202 OFFICE O/P NEW SF 15 MIN: CPT | Performed by: NEUROLOGICAL SURGERY

## 2021-12-22 PROCEDURE — G8484 FLU IMMUNIZE NO ADMIN: HCPCS | Performed by: NEUROLOGICAL SURGERY

## 2021-12-22 PROCEDURE — G8427 DOCREV CUR MEDS BY ELIG CLIN: HCPCS | Performed by: NEUROLOGICAL SURGERY

## 2021-12-27 ENCOUNTER — TELEPHONE (OUTPATIENT)
Dept: NEUROSURGERY | Age: 46
End: 2021-12-27

## 2021-12-28 ENCOUNTER — HOSPITAL ENCOUNTER (OUTPATIENT)
Dept: GENERAL RADIOLOGY | Age: 46
Discharge: HOME | End: 2021-12-30
Payer: COMMERCIAL

## 2021-12-28 ENCOUNTER — HOSPITAL ENCOUNTER (OUTPATIENT)
Age: 46
Discharge: HOME | End: 2021-12-30
Payer: COMMERCIAL

## 2021-12-28 DIAGNOSIS — M54.16 LUMBAR RADICULOPATHY: ICD-10-CM

## 2021-12-28 DIAGNOSIS — Z98.890 HISTORY OF BACK SURGERY: ICD-10-CM

## 2021-12-28 PROCEDURE — 72110 X-RAY EXAM L-2 SPINE 4/>VWS: CPT

## 2021-12-30 ENCOUNTER — OFFICE VISIT (OUTPATIENT)
Dept: FAMILY MEDICINE CLINIC | Age: 46
End: 2021-12-30
Payer: COMMERCIAL

## 2021-12-30 VITALS
SYSTOLIC BLOOD PRESSURE: 122 MMHG | TEMPERATURE: 98.8 F | BODY MASS INDEX: 38.76 KG/M2 | OXYGEN SATURATION: 96 % | HEART RATE: 87 BPM | DIASTOLIC BLOOD PRESSURE: 84 MMHG | HEIGHT: 64 IN | WEIGHT: 227 LBS

## 2021-12-30 DIAGNOSIS — E66.09 CLASS 2 OBESITY DUE TO EXCESS CALORIES WITHOUT SERIOUS COMORBIDITY WITH BODY MASS INDEX (BMI) OF 38.0 TO 38.9 IN ADULT: ICD-10-CM

## 2021-12-30 DIAGNOSIS — F41.9 ANXIETY: ICD-10-CM

## 2021-12-30 DIAGNOSIS — F40.240 CLAUSTROPHOBIA: Primary | ICD-10-CM

## 2021-12-30 PROCEDURE — G8427 DOCREV CUR MEDS BY ELIG CLIN: HCPCS | Performed by: NURSE PRACTITIONER

## 2021-12-30 PROCEDURE — 99213 OFFICE O/P EST LOW 20 MIN: CPT | Performed by: NURSE PRACTITIONER

## 2021-12-30 PROCEDURE — 1036F TOBACCO NON-USER: CPT | Performed by: NURSE PRACTITIONER

## 2021-12-30 PROCEDURE — G8417 CALC BMI ABV UP PARAM F/U: HCPCS | Performed by: NURSE PRACTITIONER

## 2021-12-30 PROCEDURE — G8484 FLU IMMUNIZE NO ADMIN: HCPCS | Performed by: NURSE PRACTITIONER

## 2021-12-30 RX ORDER — DIAZEPAM 5 MG/1
TABLET ORAL
Qty: 2 TABLET | Refills: 0 | Status: SHIPPED | OUTPATIENT
Start: 2021-12-30 | End: 2021-12-31

## 2021-12-30 RX ORDER — PHENTERMINE HYDROCHLORIDE 37.5 MG/1
37.5 TABLET ORAL
Qty: 30 TABLET | Refills: 0 | Status: SHIPPED | OUTPATIENT
Start: 2021-12-30 | End: 2022-02-01 | Stop reason: SDUPTHER

## 2021-12-30 ASSESSMENT — ENCOUNTER SYMPTOMS
SHORTNESS OF BREATH: 0
COUGH: 0
BACK PAIN: 1
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
## 2022-01-11 ENCOUNTER — TELEPHONE (OUTPATIENT)
Dept: PAIN MANAGEMENT | Age: 47
End: 2022-01-11

## 2022-01-11 NOTE — TELEPHONE ENCOUNTER
BENEFITS: [facility]    Insurance: [address]  Phone: [phone]  Contact Name: Eldon Horn  Effective Date: 1.1.2020     Plan year: YES-CALENDAR  Deductible: 1000.00      Deductible Met: 0.00  Allowed/benefits paid at: 80% AFTER DEDUCTIBLE  OOP: 3000.00 MET $0.00  Freq Limits: 96775 & 95886--BASED ON MEDICAL NECESSITY  Prior Auth Requirement: NO AUTH REQUIRED    Notes: NO PRE-EX CLAUSE    Call Reference #: [id number]    Time of call: 8:20AM

## 2022-01-13 ENCOUNTER — OFFICE VISIT (OUTPATIENT)
Dept: PAIN MANAGEMENT | Age: 47
End: 2022-01-13
Payer: COMMERCIAL

## 2022-01-13 ENCOUNTER — TELEPHONE (OUTPATIENT)
Dept: PAIN MANAGEMENT | Age: 47
End: 2022-01-13

## 2022-01-13 DIAGNOSIS — M79.604 BILATERAL LEG PAIN: Primary | ICD-10-CM

## 2022-01-13 DIAGNOSIS — M79.605 BILATERAL LEG PAIN: Primary | ICD-10-CM

## 2022-01-13 PROCEDURE — 95886 MUSC TEST DONE W/N TEST COMP: CPT | Performed by: PHYSICAL MEDICINE & REHABILITATION

## 2022-01-13 PROCEDURE — 95911 NRV CNDJ TEST 9-10 STUDIES: CPT | Performed by: PHYSICAL MEDICINE & REHABILITATION

## 2022-01-13 NOTE — TELEPHONE ENCOUNTER
PER DORYS @ [facility] [phone] PATIENT IS ACTIVE, EFFECTIVE 1/1/22.     CALL REF # [id number]

## 2022-01-19 ENCOUNTER — HOSPITAL ENCOUNTER (OUTPATIENT)
Dept: MRI IMAGING | Age: 47
Discharge: HOME | End: 2022-01-21
Payer: COMMERCIAL

## 2022-01-19 DIAGNOSIS — Z98.890 HISTORY OF BACK SURGERY: ICD-10-CM

## 2022-01-19 DIAGNOSIS — M54.16 LUMBAR RADICULOPATHY: ICD-10-CM

## 2022-01-19 PROCEDURE — 72148 MRI LUMBAR SPINE W/O DYE: CPT

## 2022-01-20 ENCOUNTER — OFFICE VISIT (OUTPATIENT)
Dept: PAIN MANAGEMENT | Age: 47
End: 2022-01-20
Payer: COMMERCIAL

## 2022-01-20 VITALS — HEIGHT: 64 IN | TEMPERATURE: 96.9 F | WEIGHT: 223 LBS | BODY MASS INDEX: 38.07 KG/M2

## 2022-01-20 DIAGNOSIS — M41.9 SCOLIOSIS, UNSPECIFIED SCOLIOSIS TYPE, UNSPECIFIED SPINAL REGION: ICD-10-CM

## 2022-01-20 DIAGNOSIS — M54.6 RIGHT-SIDED THORACIC BACK PAIN, UNSPECIFIED CHRONICITY: ICD-10-CM

## 2022-01-20 DIAGNOSIS — M54.16 LUMBAR RADICULOPATHY: Primary | ICD-10-CM

## 2022-01-20 DIAGNOSIS — M96.1 POSTLAMINECTOMY SYNDROME, LUMBAR REGION: ICD-10-CM

## 2022-01-20 PROCEDURE — 99214 OFFICE O/P EST MOD 30 MIN: CPT | Performed by: NURSE PRACTITIONER

## 2022-01-20 RX ORDER — CYCLOBENZAPRINE HCL 10 MG
10 TABLET ORAL NIGHTLY PRN
Qty: 30 TABLET | Refills: 0 | Status: SHIPPED | OUTPATIENT
Start: 2022-01-20 | End: 2022-02-15 | Stop reason: SDUPTHER

## 2022-01-20 RX ORDER — DICLOFENAC SODIUM 30 MG/G
2 GEL TOPICAL 4 TIMES DAILY PRN
Qty: 100 G | Refills: 0 | Status: SHIPPED | OUTPATIENT
Start: 2022-01-20 | End: 2022-02-01

## 2022-01-20 RX ORDER — LIDOCAINE 50 MG/G
1 PATCH TOPICAL DAILY
Qty: 30 PATCH | Refills: 0 | Status: SHIPPED | OUTPATIENT
Start: 2022-01-20 | End: 2022-02-15 | Stop reason: SDUPTHER

## 2022-01-20 ASSESSMENT — ENCOUNTER SYMPTOMS
BACK PAIN: 1
SORE THROAT: 0
SHORTNESS OF BREATH: 0
ABDOMINAL PAIN: 0

## 2022-01-20 NOTE — PROGRESS NOTES
William Eugene  (1975)    1/20/2022    Subjective:     William Eugene is 51 y.o. female who complains today of:    Chief Complaint   Patient presents with    Back Pain         Allergies:  Adhesive tape, Macrobid [nitrofurantoin monohydrate macrocrystals], and Sulfa antibiotics    Past Medical History:   Diagnosis Date    Anxiety and depression     Arthritis     Asthma     childhood only    Chronic back pain     Depression     Gastrointestinal problem     Irritable bowel syndrome     Mental disorder     PCOS (polycystic ovarian syndrome)     Urinary incontinence     stress     Past Surgical History:   Procedure Laterality Date    BACK SURGERY  [id number],2170    lumbar microdiscectomy x 3 -- last 2006    BACK SURGERY  2016    cervical fusion     BLADDER SUSPENSION  2013    BREAST BIOPSY Bilateral 1/16/13    U/S Guided core bx of 2 solid nodules int he right and left breast    CERVICAL FUSION N/A 10/28/2016    ACDF ANTERIOR CERVICAL DISKECTOMY FUSION C5-6 USING CC TRIAD HELIX MINI-PLATE, 1.5 HRS, Caryn Cantu REP  performed by Roma Valera MD at [facility]. [place] [address] & 2007    CHOLECYSTECTOMY  2005    COLONOSCOPY      ELBOW SURGERY      pins in and out, pt broke tip of elbow    ENDOMETRIAL ABLATION  2014    ENDOSCOPY, COLON, DIAGNOSTIC      FRACTURE SURGERY  1985    fx / left elbow / pins in & then removed   [facility]  12/2018    Repair recurrent VH with mesh    HYSTERECTOMY, TOTAL ABDOMINAL  04/07/16    DR RAYA    NERVE SURGERY N/A 4/27/2021    PERMANENT DORSAL COLUMN STIMULATOR PLACEMENT performed by Jameson Douglass MD at [facility]  2001, 2005, 2006    lumbar    STIMULATOR SURGERY N/A 3/11/2021    DORSAL COLUMN STIMULATOR TRIAL performed by Jameson Douglass MD at [facility]      at age 39   [address]  10/20/15    [facility] N/A 12/13/2018    With mesh     Family History   Problem Relation Age of Onset    Cancer Mother 47        breast & uterine cancer    Hypertension Mother     High Blood Pressure Mother    [facility] Breast Cancer Mother     Other Father         car accident at age 22    Cancer Maternal Grandmother         colon    Diabetes Paternal Grandfather     No Known Problems Sister     No Known Problems Daughter      Social History     Socioeconomic History    Marital status:      Spouse name: Not on file    Number of children: Not on file    Years of education: Not on file    Highest education level: Not on file   Occupational History    Not on file   Tobacco Use    Smoking status: Former Smoker     Packs/day: 0.50     Years: 10.00     Pack years: 5.00     Types: Cigarettes     Quit date: 2004     Years since quittin.1    Smokeless tobacco: Never Used   Vaping Use    Vaping Use: Never used   Substance and Sexual Activity    Alcohol use: Yes     Comment: socially    Drug use: No    Sexual activity: Yes   Other Topics Concern    Not on file   Social History Narrative    Not on file     Social Determinants of Health     Financial Resource Strain: Low Risk     Difficulty of Paying Living Expenses: Not hard at all   Food Insecurity: No Food Insecurity    Worried About [address] in the Last Year: Never true    [facility] in the Last Year: Never true   Transportation Needs: No Transportation Needs    Lack of Transportation (Medical): No    Lack of Transportation (Non-Medical):  No   Physical Activity:     Days of Exercise per Week: Not on file    Minutes of Exercise per Session: Not on file   Stress:     Feeling of Stress : Not on file   Social Connections:     Frequency of Communication with Friends and Family: Not on file    Frequency of Social Gatherings with Friends and Family: Not on file    Attends Yazdanism Services: Not on file    Active Member of Clubs or Organizations: Not on file    Attends Club or Organization Meetings: Not on file    Marital Status: Not on file   Intimate Partner Violence:     Fear of Current or Ex-Partner: Not on file    Emotionally Abused: Not on file    Physically Abused: Not on file    Sexually Abused: Not on file   Housing Stability:     Unable to Pay for Housing in the Last Year: Not on file    Number of [place] in the Last Year: Not on file    Unstable Housing in the Last Year: Not on file       Current Outpatient Medications on File Prior to Visit   Medication Sig Dispense Refill    phentermine (ADIPEX-P) 37.5 MG tablet Take 1 tablet by mouth every morning (before breakfast) for 30 days. 30 tablet 0    fluticasone (FLONASE) 50 MCG/ACT nasal spray 1 spray by Each Nostril route daily 1 Bottle 1    acetaminophen (TYLENOL) 325 MG tablet Take 650 mg by mouth every 6 hours as needed for Pain      cetirizine (ZYRTEC) 10 MG tablet Take 10 mg by mouth daily as needed        No current facility-administered medications on file prior to visit. Pt presents today for a f/u of chronic low back and BL leg Pain. EMG result no available. MRI reviewed with patient below. Pt feels pain level and functioning improves with prescribed medications and is able to perform ADLs. Pt feels that prolonged positions aggravates the pain. Pt c/o L>RLE radiating numbness and tingling, down to the foot on the left and to the knee on the right. The stimulator helps. Previously tried RFA which did not last long. PSH: large herniated disc at L3-4 - surgery in 2001. She had a recurrent disc herniation- surgery 2005 with Dr Tyra Vergara. ACDF G881 10/2016 with Dr Tyra Vergara. MRI lumbar spine 1/19/2022 shows neurostimulator, L1-2 cyst; L2-3 mild to moderate canal stenosis; L3-4 mild canal and mild to moderate right to left stenosis; L5-S1 minimal disc bulge, no stenosis; incidental findings on localizer include C5-6 fusion and multiple ovarian cysts. Reviewed with patient.  She previously was aware of cysts. Patient had consult with Dr. Sheron Diaz 12/22/2021 for back and leg pain, and was recommended pain medications and physical therapy but the MRI was not reviewed at that visit. Patient has history of ACDF 2016 by Dr. Avis Pandey a total of 3 lumbar surgeries most recently in 2006, DCS placement 4/27/2021. Patient was given a Medrol pack last month which did not help much. She is using some OTCs, aleve and tylenol. Gabapentin caused weight gain. 11/23/21 L L4-5 MARSHA  7/20/20 BL L4-5 MARSHA         Review of Systems   Constitutional: Negative for fever. HENT: Negative for congestion and sore throat. Respiratory: Negative for shortness of breath. Gastrointestinal: Negative for abdominal pain. Genitourinary: Negative for difficulty urinating. Musculoskeletal: Positive for back pain. Neurological: Negative for speech difficulty and headaches. Hematological: Negative for adenopathy. Psychiatric/Behavioral: Negative for agitation. All other systems reviewed and are negative. Objective:     Vitals:  Temp 96.9 °F (36.1 °C)   Ht 5' 4\" (1.626 m)   Wt 223 lb (101.2 kg)   LMP 03/05/2016   BMI 38.28 kg/m² Pain Score:   4      Physical Exam  Vitals and nursing note reviewed. Pt is alert and oriented x 3. Recent and remote memory is intact. Mood, judgement and affect are normal.  No signs of distress or SOB noted. Visualized skin intact. Sensation intact to light touch. Decreased ROM with flexion and extension of low back. Tender with palpation to bilateral lumbar spine with positive provacative maneuvers noted. Positive BL SLR. Strength, balance, and coordination are functional for ambulation. Assessment:      Diagnosis Orders   1. Lumbar radiculopathy  CO NJX AA&/STRD TFRML EPI LUMBAR/SACRAL 1 LEVEL   2. Right-sided thoracic back pain, unspecified chronicity     3.  Postlaminectomy syndrome, lumbar region  diclofenac (VOLTAREN) 50 MG EC tablet cyclobenzaprine (FLEXERIL) 10 MG tablet    lidocaine (LIDODERM) 5 %    Diclofenac Sodium 3 % GEL   4. Scoliosis, unspecified scoliosis type, unspecified spinal region         Plan:          Orders Placed This Encounter   Medications    diclofenac (VOLTAREN) 50 MG EC tablet     Sig: Take 1 tablet by mouth 3 times daily (with meals)     Dispense:  60 tablet     Refill:  0    cyclobenzaprine (FLEXERIL) 10 MG tablet     Sig: Take 1 tablet by mouth nightly as needed for Muscle spasms     Dispense:  30 tablet     Refill:  0    lidocaine (LIDODERM) 5 %     Sig: Place 1 patch onto the skin daily 12 hours on, 12 hours off. Dispense:  30 patch     Refill:  0    Diclofenac Sodium 3 % GEL     Sig: Apply 2 g topically 4 times daily as needed (pain)     Dispense:  100 g     Refill:  0       Orders Placed This Encounter   Procedures    [place] NJX AA&/STRD TFRML EPI LUMBAR/SACRAL 1 LEVEL     BL L3-4 MARSHA with SK     Standing Status:   Future     Standing Expiration Date:   4/20/2022     Discussed options with the patient today. Reviewed lumbar MRI in detail with patient. EMG report not available yet. She is open for any medications or procedures. Previous RFA did not give lasting relief but she is willing to retry this in the future as well. We will try bilateral L3-4 MARSHA for her lumbar radicular pain. Will send diclofenac gel, Lidoderm patches, oral diclofenac, and Flexeril 10 mg for her chronic pain. She is following up with Dr. Berny Stinson next month to further review MRI. All questions were answered. Pt verbalized understanding and agrees with above plan. Will continue medications for chronic pain as they help pt function with ADL and improve quality of life. Discussed possible risks of opiate medication with pt, including but not limited to, constipation, nausea or vomiting, sedation, urinary retention, dependence and possible addiction. Pt agrees to use medication as directed.  Pt advised to not use opiates while

## 2022-01-21 ENCOUNTER — TELEPHONE (OUTPATIENT)
Dept: PAIN MANAGEMENT | Age: 47
End: 2022-01-21

## 2022-01-21 NOTE — TELEPHONE ENCOUNTER
BILAT L3-4 MARSHA    NO AUTH REQUIRED    OK to schedule procedure approved as above. Please note sides/levels approved and date range.    (If applicable, sides/levels approved may differ from those ordered)     [address]

## 2022-01-21 NOTE — TELEPHONE ENCOUNTER
Diclofenac Sodium 3% gel prior authorization request submitted online ([email or web address]), clinical uploaded, Tuyet Gonzales pending.       (Rik Long) - [id number]

## 2022-01-21 NOTE — TELEPHONE ENCOUNTER
BENEFITS:    Insurance: Acacia Alvarado  Phone: [phone]  Contact Name: SHIREEN Stallworth Date: 1/1/22     Plan year: CAL YEAR  Deductible: N/A      Deductible Met: N/A  Allowed/benefits paid at: 100% AFTER $40 COPAY  OOP: N/A  Freq Limits: MEDICAL NECESSITY  Prior Auth Requirement: NO AUTH REQUIRED    Notes:     Call Reference #: [id number]    Time of call:

## 2022-01-21 NOTE — TELEPHONE ENCOUNTER
ORDER PLACED:    Date: 1/20/22  Description: BILAT L3-4 MARSHA  Order Number: [id number]  Ordering Provider: [facility] MAURO  Performing Provider: MAICO  CPT Codes: 61930  ICD10 Codes: M54.16

## 2022-01-22 NOTE — PROGRESS NOTES
Electromyography (EMG)/Nerve conduction studies (NCS) Report: Lower Extremity    Name: Ngoc Harris   YOB: 1975  Date of Service: 1/13/2022   Provider: Rivas Dawn MD        INDICATIONS:  Ngoc Harris is a 51 y.o. female who presents for electrodiagnostic evaluation for bilateral leg pain by request of Rin Alicea CNP working with Dr. Hannah Silva. She confirms a history of lumbar spine surgery. Both limbs are necessary to examine in order to evaluate for any evidence of systemic disease as well as establish normal baseline values from which to compare any abnormal unilateral findings. The study is explained and verbal consent to proceed is obtained. NERVE CONDUCTION STUDIES:   Sensory nerve conduction studies: These studies are limited due to the presence of lotion. Bilateral sural and superficial peroneal sensory nerve conduction studies demonstrate normal distal latencies and amplitudes. Waveforms are limited in all studies. Bilateral lower limb temperatures are normal.     Motor nerve conduction studies: Bilateral peroneal motor nerve conduction studies with pickup over the extensor digitorum brevis demonstrate normal distal latencies and amplitudes. Bilateral tibial motor nerve conduction studies with pickup over the abductor hallucis demonstrate normal distal latencies and amplitudes. H reflex: Bilateral H reflexes are symmetric and not prolonged, waveforms are limited bilaterally. ELECTROMYOGRAPHY: A disposable monopolar needle is used to evaluate bilateral vastus medialis, tibialis anterior, extensor hallucis longus, flexor digitorum longus, peroneus longus, medial gastrocnemius, and lateral gastrocnemius. All of the muscles sampled are free of any increased insertional activity or any abnormal spontaneous activity. Motor unit recruitment is unremarkable. Lumbar paraspinal muscle sampling is deferred given history of lumbar spine surgery.      SUMMARY:  This study is limited, but normal. There is no current electrodiagnostic evidence for an active bilateral lumbosacral motor radiculopathy. Although limited, there is no clear evidence for a generalized large fiber sensorimotor peripheral polyneuropathy. RECOMMENDATIONS: Today's study does not explain the patient's bilateral leg pain. The patient should follow up with Mannie Avelar CNP and/or Dr. Chel Casarez as previously instructed. If her symptoms persist or worsen, further electrodiagnostic evaluation may be considered if the patient is agreeable. Clinical correlation is recommended.

## 2022-01-25 ENCOUNTER — PROCEDURE VISIT (OUTPATIENT)
Dept: PAIN MANAGEMENT | Age: 47
End: 2022-01-25
Payer: COMMERCIAL

## 2022-01-25 DIAGNOSIS — M54.16 LUMBAR RADICULOPATHY: ICD-10-CM

## 2022-01-25 PROCEDURE — 64483 NJX AA&/STRD TFRM EPI L/S 1: CPT | Performed by: PAIN MEDICINE

## 2022-01-25 RX ORDER — SODIUM CHLORIDE 9 MG/ML
10 INJECTION INTRAVENOUS ONCE
Status: COMPLETED | OUTPATIENT
Start: 2022-01-25 | End: 2022-01-25

## 2022-01-25 RX ORDER — DEXAMETHASONE SODIUM PHOSPHATE 10 MG/ML
10 INJECTION, EMULSION INTRAMUSCULAR; INTRAVENOUS ONCE
Status: COMPLETED | OUTPATIENT
Start: 2022-01-25 | End: 2022-01-25

## 2022-01-25 RX ADMIN — DEXAMETHASONE SODIUM PHOSPHATE 10 MG: 10 INJECTION, EMULSION INTRAMUSCULAR; INTRAVENOUS at 08:00

## 2022-01-25 RX ADMIN — SODIUM CHLORIDE 10 ML: 9 INJECTION INTRAVENOUS at 07:59

## 2022-01-25 NOTE — PROGRESS NOTES
[email or web address].  Spine Surgery  Advanced Pain Management      Patient Name: Marialuisa Ivory : 1975  Date: 2022   Physician: Nitin Graham DO      Marialuisa Vegata  is here today for interventional pain management. Preoperatively, the patient presents with radicular pain in the affected area as per history and exam. Patient has failed NSAIDs, PT, and conservative treatment. Patient has significant psychological and functional impairment due to this condition. Standard ASI guidelines were followed and sterile technique used. Area was cleaned with Betadine x3. Informed consent was obtained. Fluoroscopic guidance was used for this procedure. TRANSFORAMINAL EPIDURAL  There was appropriate spread of contrast in the anterior epidural space and around the exiting nerve root. The 6 oclock position of the pedicle was identified. Multiple views of fluoroscopy including lateral were used to confirm accurate needle placement of depth. Live fluoroscopy was used when injecting contrast to ensure no subdural or vascular spread. In total, approximately 5 mg of Dexamethasone and 1.0 ml of 0.9cc of normal saline was injected slowly without difficulty. Patient tolerated the procedure well, no obvious complications occurred during the procedure. Patient was appropriately monitored and discharged home in stable condition with their usual motor strength. Post Op instructions were given to patient. Patient will resume blood thinners after procedure if they stopped them before procedure. Relevant imaging was reviewed with patient. [x] Bilateral [] T12-L1 [x] L3-4        [] L1-2 [] L4-5       [] Right [] L2-3 [] L5-S1                [] Left                  Nitin Graham DO      [address] [facility] [address]  Phone [phone]/[facility] [email or web address]

## 2022-01-26 NOTE — TELEPHONE ENCOUNTER
TRIED TO CALL THE PATIENT AND THE PHONE JUST RANG AND RANG IF PATIENT CALLS BACK PLEASE INFORM HER OF MEDICATION DENIAL

## 2022-01-26 NOTE — TELEPHONE ENCOUNTER
Patient's insurance has denied coverage of Diclofenac Sodium 3% gel for the following reason(s):    -This drug is not being used to treat actinic keratosis. This is a skin issue caused by too much sun. It causes scaly, rough or bumpy spots on the skin. How would Thresea Course like to proceed?   Please respond to Pain Clinical Pool

## 2022-01-27 NOTE — TELEPHONE ENCOUNTER
I called and spoke with pt to let her know Diclofenac 3% gel has been denied through her insurance. Pt understood.

## 2022-02-01 ENCOUNTER — OFFICE VISIT (OUTPATIENT)
Dept: FAMILY MEDICINE CLINIC | Age: 47
End: 2022-02-01
Payer: COMMERCIAL

## 2022-02-01 VITALS
WEIGHT: 232 LBS | HEART RATE: 96 BPM | OXYGEN SATURATION: 97 % | DIASTOLIC BLOOD PRESSURE: 74 MMHG | SYSTOLIC BLOOD PRESSURE: 136 MMHG | HEIGHT: 64 IN | TEMPERATURE: 96.8 F | BODY MASS INDEX: 39.61 KG/M2

## 2022-02-01 DIAGNOSIS — E28.2 POLYCYSTIC OVARY SYNDROME: Primary | ICD-10-CM

## 2022-02-01 DIAGNOSIS — E66.09 CLASS 2 OBESITY DUE TO EXCESS CALORIES WITHOUT SERIOUS COMORBIDITY WITH BODY MASS INDEX (BMI) OF 38.0 TO 38.9 IN ADULT: ICD-10-CM

## 2022-02-01 PROCEDURE — 99213 OFFICE O/P EST LOW 20 MIN: CPT | Performed by: NURSE PRACTITIONER

## 2022-02-01 RX ORDER — PHENTERMINE HYDROCHLORIDE 37.5 MG/1
37.5 TABLET ORAL
Qty: 30 TABLET | Refills: 0 | Status: SHIPPED | OUTPATIENT
Start: 2022-02-01 | End: 2022-03-01 | Stop reason: SDUPTHER

## 2022-02-01 ASSESSMENT — ENCOUNTER SYMPTOMS
COUGH: 0
SHORTNESS OF BREATH: 0
BACK PAIN: 1

## 2022-02-01 ASSESSMENT — PATIENT HEALTH QUESTIONNAIRE - PHQ9
SUM OF ALL RESPONSES TO PHQ QUESTIONS 1-9: 0
SUM OF ALL RESPONSES TO PHQ QUESTIONS 1-9: 0
5. POOR APPETITE OR OVEREATING: 0
3. TROUBLE FALLING OR STAYING ASLEEP: 0
4. FEELING TIRED OR HAVING LITTLE ENERGY: 0
SUM OF ALL RESPONSES TO PHQ QUESTIONS 1-9: 0
6. FEELING BAD ABOUT YOURSELF - OR THAT YOU ARE A FAILURE OR HAVE LET YOURSELF OR YOUR FAMILY DOWN: 0
8. MOVING OR SPEAKING SO SLOWLY THAT OTHER PEOPLE COULD HAVE NOTICED. OR THE OPPOSITE, BEING SO FIGETY OR RESTLESS THAT YOU HAVE BEEN MOVING AROUND A LOT MORE THAN USUAL: 0
SUM OF ALL RESPONSES TO PHQ QUESTIONS 1-9: 0
9. THOUGHTS THAT YOU WOULD BE BETTER OFF DEAD, OR OF HURTING YOURSELF: 0
SUM OF ALL RESPONSES TO PHQ9 QUESTIONS 1 & 2: 0
7. TROUBLE CONCENTRATING ON THINGS, SUCH AS READING THE NEWSPAPER OR WATCHING TELEVISION: 0
10. IF YOU CHECKED OFF ANY PROBLEMS, HOW DIFFICULT HAVE THESE PROBLEMS MADE IT FOR YOU TO DO YOUR WORK, TAKE CARE OF THINGS AT HOME, OR GET ALONG WITH OTHER PEOPLE: 0
1. LITTLE INTEREST OR PLEASURE IN DOING THINGS: 0
2. FEELING DOWN, DEPRESSED OR HOPELESS: 0
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
## 2022-02-15 ENCOUNTER — OFFICE VISIT (OUTPATIENT)
Dept: PAIN MANAGEMENT | Age: 47
End: 2022-02-15
Payer: COMMERCIAL

## 2022-02-15 VITALS
HEIGHT: 64 IN | SYSTOLIC BLOOD PRESSURE: 122 MMHG | BODY MASS INDEX: 38.41 KG/M2 | DIASTOLIC BLOOD PRESSURE: 80 MMHG | TEMPERATURE: 97.5 F | WEIGHT: 225 LBS

## 2022-02-15 DIAGNOSIS — M47.816 LUMBAR SPONDYLOSIS: Primary | ICD-10-CM

## 2022-02-15 DIAGNOSIS — M96.1 POSTLAMINECTOMY SYNDROME, LUMBAR REGION: ICD-10-CM

## 2022-02-15 PROCEDURE — 99214 OFFICE O/P EST MOD 30 MIN: CPT | Performed by: NURSE PRACTITIONER

## 2022-02-15 RX ORDER — LIDOCAINE 50 MG/G
1 PATCH TOPICAL DAILY
Qty: 30 PATCH | Refills: 2 | Status: SHIPPED | OUTPATIENT
Start: 2022-02-15 | End: 2022-03-29 | Stop reason: SDUPTHER

## 2022-02-15 RX ORDER — CYCLOBENZAPRINE HCL 10 MG
10 TABLET ORAL NIGHTLY PRN
Qty: 30 TABLET | Refills: 2 | Status: SHIPPED | OUTPATIENT
Start: 2022-02-15 | End: 2022-03-29 | Stop reason: SDUPTHER

## 2022-02-15 ASSESSMENT — ENCOUNTER SYMPTOMS
BACK PAIN: 1
ABDOMINAL PAIN: 0
SHORTNESS OF BREATH: 0
SORE THROAT: 0

## 2022-02-15 NOTE — PROGRESS NOTES
Jonah Lee  (1975)    2/15/2022    Subjective:     Jonah Lee is 51 y.o. female who complains today of:    Chief Complaint   Patient presents with    Follow-up     Lumbar - Bilateral Leg Pain         Allergies:  Adhesive tape, Macrobid [nitrofurantoin monohydrate macrocrystals], and Sulfa antibiotics    Past Medical History:   Diagnosis Date    Anxiety and depression     Arthritis     Asthma     childhood only    Chronic back pain     Depression     Gastrointestinal problem     Irritable bowel syndrome     Mental disorder     PCOS (polycystic ovarian syndrome)     Urinary incontinence     stress     Past Surgical History:   Procedure Laterality Date    BACK SURGERY  9559,[id number]    lumbar microdiscectomy x 3 -- last 2006    BACK SURGERY  2016    cervical fusion     BLADDER SUSPENSION  2013    BREAST BIOPSY Bilateral 1/16/13    U/S Guided core bx of 2 solid nodules int he right and left breast    CERVICAL FUSION N/A 10/28/2016    ACDF ANTERIOR CERVICAL DISKECTOMY FUSION C5-6 USING CC TRIAD HELIX MINI-PLATE, 1.5 [employer] REP  performed by Didier King MD at [facility]. [address] & 2007    CHOLECYSTECTOMY  2005    COLONOSCOPY      ELBOW SURGERY      pins in and out, pt broke tip of elbow    ENDOMETRIAL ABLATION  2014    ENDOSCOPY, COLON, DIAGNOSTIC      FRACTURE SURGERY  1985    fx / left elbow / pins in & then removed   [facility]  12/2018    Repair recurrent VH with mesh    HYSTERECTOMY, TOTAL ABDOMINAL  04/07/16    DR RAYA    NERVE SURGERY N/A 4/27/2021    PERMANENT DORSAL COLUMN STIMULATOR PLACEMENT performed by Marita Long MD at [address] [place] [address]  2001, 2005, 2006    lumbar    STIMULATOR SURGERY N/A 3/11/2021    DORSAL COLUMN STIMULATOR TRIAL performed by Marita Long MD at [facility]      at age 39   [address]  10/20/15    Etienne Lewis MD   [address] [place] [address] 2018    With mesh     Family History   Problem Relation Age of Onset   Gautam Enriquez Cancer Mother 47        breast & uterine cancer    Hypertension Mother     High Blood Pressure Mother    Gautam Enriquez Breast Cancer Mother     Other Father         car accident at age 22    Cancer Maternal Grandmother         colon    Diabetes Paternal Grandfather     No Known Problems Sister     No Known Problems Daughter      Social History     Socioeconomic History    Marital status:      Spouse name: Not on file    Number of children: Not on file    Years of education: Not on file    Highest education level: Not on file   Occupational History    Not on file   Tobacco Use    Smoking status: Former Smoker     Packs/day: 0.50     Years: 10.00     Pack years: 5.00     Types: Cigarettes     Quit date: 2004     Years since quittin.2    Smokeless tobacco: Never Used   Vaping Use    Vaping Use: Never used   Substance and Sexual Activity    Alcohol use: Yes     Comment: socially    Drug use: No    Sexual activity: Yes   Other Topics Concern    Not on file   Social History Narrative    Not on file     Social Determinants of Health     Financial Resource Strain: Low Risk     Difficulty of Paying Living Expenses: Not hard at all   Food Insecurity: No Food Insecurity    Worried About [address] in the Last Year: Never true    [address] in the Last Year: Never true   Transportation Needs: No Transportation Needs    Lack of Transportation (Medical): No    Lack of Transportation (Non-Medical):  No   Physical Activity:     Days of Exercise per Week: Not on file    Minutes of Exercise per Session: Not on file   Stress:     Feeling of Stress : Not on file   Social Connections:     Frequency of Communication with Friends and Family: Not on file    Frequency of Social Gatherings with Friends and Family: Not on file    Attends Sabianism Services: Not on file    Active Member of Clubs or Organizations: Not on file    Attends Club or Organization Meetings: Not on file    Marital Status: Not on file   Intimate Partner Violence:     Fear of Current or Ex-Partner: Not on file    Emotionally Abused: Not on file    Physically Abused: Not on file    Sexually Abused: Not on file   Housing Stability:     Unable to Pay for Housing in the Last Year: Not on file    Number of [place] in the Last Year: Not on file    Unstable Housing in the Last Year: Not on file       Current Outpatient Medications on File Prior to Visit   Medication Sig Dispense Refill    phentermine (ADIPEX-P) 37.5 MG tablet Take 1 tablet by mouth every morning (before breakfast) for 30 days. 30 tablet 0    fluticasone (FLONASE) 50 MCG/ACT nasal spray 1 spray by Each Nostril route daily 1 Bottle 1    acetaminophen (TYLENOL) 325 MG tablet Take 650 mg by mouth every 6 hours as needed for Pain      cetirizine (ZYRTEC) 10 MG tablet Take 10 mg by mouth daily as needed        No current facility-administered medications on file prior to visit. Pt presents today for a f/u of chronic low back and BL leg Pain. Recent MARSHA helped but she feels it has already completely wore off. Insurance would not cover diclofenac gel. EMG BLE 1/13/2022 was limited but normal.  Pt feels pain level and functioning improves with prescribed medications and is able to perform ADLs. Pt feels that prolonged positions aggravates the pain. Pt c/o L>RLE radiating numbness and tingling, down to the foot on the left and to the knee on the right. The stimulator helps. Previously tried RFA which did not last long. PSH: large herniated disc at L3-4 - surgery in 2001. She had a recurrent disc herniation- surgery 2005 with Dr Marisabel Ivan. ACDF C111 10/2016 with Dr Marisabel Ivan. MRI lumbar spine 1/19/2022 shows neurostimulator, L1-2 cyst; L2-3 mild to moderate canal stenosis; L3-4 mild canal and mild to moderate right to left stenosis;  L5-S1 minimal disc bulge, no stenosis; incidental findings on localizer include C5-6 fusion and multiple ovarian cysts. Reviewed with patient. She previously was aware of cysts. Patient had consult with Dr. Aristides Fuchs 12/22/2021 for back and leg pain, and was recommended pain medications and physical therapy but the MRI was not reviewed at that visit. Patient has history of ACDF 2016 by Dr. Josefa Connelly a total of 3 lumbar surgeries most recently in 2006, DCS placement 4/27/2021. Patient was given a Medrol pack two months ago which did not help much. She is using some OTCs, aleve and tylenol. Gabapentin caused weight gain. 1/25/22 BL L3-4 MARSHA  11/23/21 L L4-5 MARSHA  7/20/20 BL L4-5 MARSHA         Review of Systems   Constitutional: Negative for fever. HENT: Negative for congestion and sore throat. Respiratory: Negative for shortness of breath. Gastrointestinal: Negative for abdominal pain. Genitourinary: Negative for difficulty urinating. Musculoskeletal: Positive for back pain. Neurological: Negative for speech difficulty and headaches. Hematological: Negative for adenopathy. Psychiatric/Behavioral: Negative for agitation. All other systems reviewed and are negative. Objective:     Vitals:  /80 (Site: Right Upper Arm)   Temp 97.5 °F (36.4 °C) (Infrared)   Ht 5' 4\" (1.626 m)   Wt 225 lb (102.1 kg)   LMP 03/05/2016   BMI 38.62 kg/m² Pain Score:   4      Physical Exam  Vitals and nursing note reviewed. Pt is alert and oriented x 3. Recent and remote memory is intact. Mood, judgement and affect are normal.  No signs of distress or SOB noted. Visualized skin intact. Sensation intact to light touch. Decreased ROM with flexion and extension of low back. Tender with palpation to bilateral lumbar spine with positive provacative maneuvers noted. Positive BL SLR. Strength, balance, and coordination are functional for ambulation. Assessment:      Diagnosis Orders   1.  Lumbar spondylosis  VT and improvement with past RF ablations. she has failed conservative treatment in the past. Anatomic model of pathology was shown. Risks and benefits of the procedure were discussed. All questions were answered and patient understands and agrees with the plan.         Will continue medications for chronic pain as they help pt function with ADL and improve quality of life. OARRS was reviewed. This NP saw pt under direct supervision of Dr. Jhoana Bettencourt. Follow up:  Return if symptoms worsen or fail to improve.     MAURICIO Coy - CNP

## 2022-02-16 ENCOUNTER — TELEPHONE (OUTPATIENT)
Dept: PAIN MANAGEMENT | Age: 47
End: 2022-02-16

## 2022-02-18 NOTE — PROGRESS NOTES
Patient Name: Pamella Crump : 1975        Date: 2022      Type of Appt: Follow up     Reason for appt: RETURN AFTER STUDIES 30 MINS  [address]    Last seen by Dr. Pippa Hahn 21    Studies done: 22 L/S MRI @ [facility]    Surgeries: Dorsal column stimulator placement 21 by Dr. Vi Noel  3 back surgeries, most recent 2006  10-28-16 ACDF ANTERIOR CERVICAL DISKECTOMY FUSION C5-6 USING [place] Jay Dr. Abigail Fisher     Physical therapy: therapy done at [facility] for thoracic & shoulder    Conservative Tx tried: Pain management injections with Dr. Chang Anaya, Tylenol, Ibuprofen, Medrol Dosepak    Smoking: No - Former    The patient is a 51 y.o. female who presents today for evaluation of the following problems:          Chief Complaint   Patient presents with    Back Pain         Referred by MAURICIO Palmer - *                    Estimated body mass index is 38.28 kg/m² as calculated from the following:    Height as of this encounter: 5' 4\" (1.626 m). Weight as of this encounter: 223 lb (101.2 kg).      PAST MEDICAL, FAMILY AND SOCIAL HISTORY:  Past Medical History        Past Medical History:   Diagnosis Date    Anxiety and depression      Arthritis      Asthma       childhood only    Chronic back pain      Depression      Gastrointestinal problem      Irritable bowel syndrome      Mental disorder      PCOS (polycystic ovarian syndrome)      Urinary incontinence       stress         Past Surgical History         Past Surgical History:   Procedure Laterality Date    BACK SURGERY   [id number]     lumbar microdiscectomy x 3 -- last     BACK SURGERY   2016     cervical fusion     BLADDER SUSPENSION   2013    BREAST BIOPSY Bilateral 13     U/S Guided core bx of 2 solid nodules int he right and left breast    CERVICAL FUSION N/A 10/28/2016     ACDF ANTERIOR CERVICAL DISKECTOMY FUSION C5-6 USING CC TRIAD HELIX MINI-PLATE, 1.5 HRS, NUVASIVE REP  performed by Harless Bamberger, MD at [address] 2007    CHOLECYSTECTOMY   2005    COLONOSCOPY        ELBOW SURGERY         pins in and out, pt broke tip of elbow    ENDOMETRIAL ABLATION   2014    ENDOSCOPY, COLON, DIAGNOSTIC        FRACTURE SURGERY   1985     fx / left elbow / pins in & then removed    GALLBLADDER SURGERY        HERNIA REPAIR   12/2018     Repair recurrent VH with mesh    HYSTERECTOMY, TOTAL ABDOMINAL   04/07/16     DR RAYA    NERVE SURGERY N/A 4/27/2021     PERMANENT DORSAL COLUMN STIMULATOR PLACEMENT performed by Bel Matthews MD at [facility]   2001, 2005, 2006     lumbar    STIMULATOR SURGERY N/A 3/11/2021     DORSAL COLUMN STIMULATOR TRIAL performed by Bel Matthews MD at [facility]         at age 39   [address]   10/20/15     Luz Daniels MD   [address] [facility] N/A 12/13/2018     With mesh         Family History         Family History   Problem Relation Age of Onset    Cancer Mother 47         breast & uterine cancer    Hypertension Mother      High Blood Pressure Mother      Breast Cancer Mother      Other Father           car accident at age 22    Cancer Maternal Grandmother           colon    Diabetes Paternal Grandfather      No Known Problems Sister      No Known Problems Daughter                  Current Outpatient Medications on File Prior to Visit   Medication Sig Dispense Refill    methylPREDNISolone (MEDROL DOSEPACK) 4 MG tablet Take by mouth. 1 kit 0    naltrexone-buPROPion (CONTRAVE) 8-90 MG per extended release tablet Take 2 tablets by mouth 2 times daily 120 tablet 2    fluticasone (FLONASE) 50 MCG/ACT nasal spray 1 spray by Each Nostril route daily 1 Bottle 1    acetaminophen (TYLENOL) 325 MG tablet Take 650 mg by mouth every 6 hours as needed for Pain        cetirizine (ZYRTEC) 10 MG tablet Take 10 mg by mouth daily as needed           No current facility-administered medications on file prior to visit.    Social History            Tobacco Use    Smoking status: Former Smoker       Packs/day: 0.50       Years: 10.00       Pack years: 5.00       Types: Cigarettes       Quit date: 2004       Years since quittin.1    Smokeless tobacco: Never Used   Vaping Use    Vaping Use: Never used   Substance Use Topics    Alcohol use: Yes       Comment: socially    Drug use: No         ALLERGIES       Allergies   Allergen Reactions    Adhesive Tape Rash    Macrobid [Nitrofurantoin Monohydrate Macrocrystals] Rash    Sulfa Antibiotics Rash            I have personally reviewed the PMH, PSH, family history, home medications, social history, allergies, ROS.     Physical Exam:       Vitals       Vitals:     21 1322   BP: 114/76   Site: Right Upper Arm   Temp: 96.8 °F (36 °C)   TempSrc: Temporal   Weight: 223 lb (101.2 kg)   Height: 5' 4\" (1.626 m)            Physical Exam:  Vitals and notes reviewed. Constitutional:  See above height, weight, pulse rate, and blood pressure. BMI 38     Appearance: Normal appearance. Head:      Normocephalic and atraumatic. Ears, Nose, Mouth, and Throat:      Normal shape, no discharge, deglutition intact  Eyes:      Extraocular Movements: Extraocular movements intact.      Pupils: Pupils are equal, round, and reactive to light. Cardiovascular:      Pulses: Normal radialis pulses.      Heart sounds: Normal heart sounds, regular rate, no rubs or murmurs. Respiratory:      lungs clear to auscultation  Abdomen:        Soft to palpation. Bowel sounds present. Genitourinary:      Normal for sex  Musculoskeletal:      Gait: Regular walk Intact. Toe walking to walk shows give way strength 4/5 left lower extremity. Numbness both lower extremities normal on the right objectively and decreased on the left. Deep tendon reflexes absent left knee and ankle.     General: Normal range of motion. Extremities well developed and symmetric.  Muscle tone normal with no spasticity or fasciculations. Skin:     General: Skin is warm and dry.      Capillary Refill: Capillary refill less than 2 seconds. Neurological:      Mental Status: Alert and oriented to person, place, and time. Cranial nerves individually tested 2 through 12 normal  Hematologic/Lymphatic/Immunologic:      Negative for lymphadenopathy, hematomas. Psychiatric:         Mood and Affect: Mood normal. Appropriate affect     I have reviewed all laboratory studies, reports, data, and pertinent images.     11/11/2021 x-ray thoracic spine noms       [facility] uses the name Maria Teresa Bailey, not Vitaliy Huertas which is correct     12/28/2021 x-ray lumbar spine  EXAMINATION: XR LUMBAR SPINE (MIN 4 VIEWS)       CLINICAL HISTORY: LUMBAR RADICULOPATHY. HISTORY OF BACK SURGERY.       COMPARISONS: OCTOBER 28, 2020.       FINDINGS: Lumbar vertebral bodies normal in height. 1.5 mm anterolisthesis L4 on L5 in neutral, flexion, and extension. Diffuse disc space narrowing, L3-4 and L5-S1. Increased facet sclerosis L2-L5. Neural stimulator within left posterior pelvic soft    tissues, with no stimulating wires entering central canal at T12-L1 level. Tip of neural stimulating wires identified at base of T8. No fracture. No bone lesion. Surgical clips gallbladder fossa.           Impression   MULTILEVEL DEGENERATIVE CHANGE, LUMBAR SPINE WITH 1.5 MM L4 ANTEROLISTHESIS ON FLEXION, EXTENSION, AND NEUTRAL POSITION.       1/19/2022 MRI lumbar spine  MRI lumbar spine       HISTORY: Chronic low back pain radiating to both lower extremities greater on the left than right.       COMPARISON: 12/20/2021 lumbar spine x-ray. 7/20/2020 lumbar spine MRI.       TECHNIQUE: Sagittal T1, T2, and inversion recovery (STIR). Axial and coronal T2. Sagittal T2 whole spine localizer. Sagittal T2 cervicothoracic localizer.  3 plane abdominal pelvic T2 localizer.       FINDINGS:       Suboptimal image quality due to motion, particularly on the sagittal inversion recovery sequence.     For the purposes of enumerating the lumbar disc levels the lowest lumbar-type disc will be referred to as L5-S1.       Line of susceptibility artifact extending from the subcutaneous fat into the posterior epidural space at the thoracolumbar junction compatible with epidural neurostimulator wire. Punctate adjacent postoperative susceptibility artifact in the adjacent    fat. The wire connected device is in the posterior left upper buttocks.        Conus medullaris terminates at approximately the T12-L1 level. Mild intrasubstance signal is considered due to motion, or adjacent epidural stimulator related, artifact but the conus is otherwise unremarkable.       Lumbar spine subtle levoscoliosis may also be positional.       Lumbar vertebral body heights maintained. No sign of spondylolysis.        L2 posterior vertebral body right-sided subcentimeter bone island. Scattered small vertebral body focal fatty marrow rests.       T10-11 through the T12-L1 disc levels without significant degeneration. No posterior disc bulging, canal or foraminal stenosis. T12-L1 left superior facet spurring causing mild mass effect on the posterior lateral aspect of the thecal sac but no    significant canal stenosis.       L1-2 (L1): Disc without significant degeneration having maintained height and degree of hydration. Very subtle loss of the posterior disc concavity without significant anterior thecal sac mass effect, canal or foraminal stenosis. Mild ligamentum flavum    hypertrophy. Tiny 4.6 x 2.3 mm cyst hugging the left anterior spinous process margin.       L2-3 (L2): Mild disc space narrowing more pronounced posteriorly. Diffuse mild posterior disc bulging more pronounced centrally. A very subtle protrusion of the disc below the superior L3 endplate. Mild mass effect on the anterior thecal sac. Moderate    ligamentum flavum hypertrophy greater on the right than left.  Mild-moderate associated thecal sac narrowing measuring 7 mm in AP dimension accentuated by posterior epidural lipomatosis. No significant foraminal narrowing. Very mild facet arthrosis.       L3-4 (L3): Moderate to severe disc space narrowing and desiccation. Mild degenerative type II (fat) endplate signal changes posteriorly. Mild posterior disc bulging with a superimposed right paracentral and slightly inferior disc protrusion; disc    material extending approximately 6 mm below the superior L4 endplate. Mild associated anterior thecal sac mass effect. Ligamentum flavum hypertrophy on the left, and bilateral facet arthrosis along with the disc protrusion, causes very mild thecal sac AP    dimension narrowing measuring just under 10 mm and mild to moderate right to left transverse dimension narrowing measuring 7.2 mm. Suspect at least mild left greater than right superior L4 lateral recess narrowing mainly due to ligamentous hypertrophy    and facet arthrosis.       L3-4 mild right foraminal narrowing due to disc space height loss, minimal foraminal bulging of the desiccated disc, and superior facet joint degenerative spurring. Very mild medial left foraminal narrowing. Overall facet arthrosis is mild to moderate.       L4-5 (L4): Mild to moderate disc space narrowing. No significant disc bulging. No anterior thecal sac mass effect or canal stenosis. Right moderate to severe facet arthrosis with most of the spurring laterally, not encroaching on the neural foramina. Mild left facet arthrosis without foraminal stenosis.       L5-S1 (L5): Mild to moderate disc space narrowing. Minimal posterior disc bulging and anterior thecal sac mass effect without significant canal stenosis. No significant foraminal stenosis. Moderate left and minimal right facet arthrosis.         Incidental findings including incompletely characterized on the localizer:       T7-8 right paracentral focal disc bulge or shallow broad-based protrusion partly included on the localizer.  Suspect any associated canal narrowing is mild although evaluation is limited without axial sequences. T8-9 mild medial right foraminal focal disc    bulge or shallow protrusion without sign of significant canal or foraminal narrowing based on the limited images. Generalized mild-moderate mid to upper thoracic spine disc space narrowing.       C5-6 cervical anterior plate and screw fusion. Suspect very mild posterior bulging of the desiccated disc-endplate osteophyte at H5-4 below the fusion level.       Pelvic bilateral adnexal well-defined bright T2 signal structures, on the right measuring 26 x 21 x 24 mm and the left 28 x 23 x 30 mm. Hysterectomy.               Impression       Epidural stimulator.       Degenerative disc disease most pronounced at L3-4 where there is a posterior right paracentral and inferior disc protrusion contributing to mild-moderate canal stenosis; mild right and minimal left foraminal stenosis due to facet arthrosis. Left greater    than right L4 superior lateral recess narrowing as detailed above.       L2-3 mild-moderate thecal sac narrowing predominantly due to posterior disc bulging, and ligamentum flavum hypertrophy.       L4-5 severe right facet arthrosis with most of the spurring laterally not encroaching on the neural foramina. Milder facet arthrosis at other levels described above.       C5-6 cervical fusion, T7-8 right paracentral focal posterior disc bulge or shallow protrusion, and T8-9 minimal right foraminal bulging, partly included on the exam.       Probable ovarian cysts within the pelvis bilaterally, and hysterectomy. Correlate with pelvic ultrasound. 1/19/2022 MRI lumbar spine             HISTORY OF PRESENT ILLNESS:      DCS placement 4-27-21. History of three back surgeries with most recent one in 2006.     History of ACDF 10-28-16 by Dr. Ruby Burgess.     5 months patient's had progressive increasing back and left lower extremity pain.   The some on the right side but not significant. On the right from the hip to the knee but can be managed. The pain on the left side is increasingly worse interfering with her ability to walk. She can't stand more than 20 minutes before the pain becomes so severe she has to sit down or lay down. This is a new pain for her. Patient is undergone full course of medications anti-inflammatory medications physical therapy pain management modalities.     Lumbar spine lateral flexion-extension 12/28/2021 does not show any evidence of instability  MRI lumbar 1/19/2022 does show bulging disc L2-3-4 small protrusion to the right side opposite the side of her pain. All of the foramens are open on the left side. I showed the patient all of the studies on the computer screen answered all of her questions.   At this time she is not a candidate for neuro spine surgery     Piper Cunningham MD

## 2022-02-22 ENCOUNTER — OFFICE VISIT (OUTPATIENT)
Dept: NEUROSURGERY | Age: 47
End: 2022-02-22
Payer: COMMERCIAL

## 2022-02-22 VITALS
BODY MASS INDEX: 38.07 KG/M2 | WEIGHT: 223 LBS | TEMPERATURE: 97.4 F | HEIGHT: 64 IN | SYSTOLIC BLOOD PRESSURE: 118 MMHG | DIASTOLIC BLOOD PRESSURE: 78 MMHG

## 2022-02-22 DIAGNOSIS — M54.16 LUMBAR RADICULOPATHY: Primary | ICD-10-CM

## 2022-02-22 DIAGNOSIS — M47.816 LUMBAR SPONDYLOSIS: ICD-10-CM

## 2022-02-22 PROCEDURE — 99214 OFFICE O/P EST MOD 30 MIN: CPT | Performed by: NEUROLOGICAL SURGERY

## 2022-02-28 ENCOUNTER — OFFICE VISIT (OUTPATIENT)
Dept: PAIN MANAGEMENT | Age: 47
End: 2022-02-28
Payer: COMMERCIAL

## 2022-02-28 DIAGNOSIS — M47.816 LUMBAR SPONDYLOSIS: ICD-10-CM

## 2022-02-28 PROCEDURE — 64636 DESTROY L/S FACET JNT ADDL: CPT | Performed by: PAIN MEDICINE

## 2022-02-28 PROCEDURE — 64635 DESTROY LUMB/SAC FACET JNT: CPT | Performed by: PAIN MEDICINE

## 2022-02-28 RX ORDER — BETAMETHASONE SODIUM PHOSPHATE AND BETAMETHASONE ACETATE 3; 3 MG/ML; MG/ML
6 INJECTION, SUSPENSION INTRA-ARTICULAR; INTRALESIONAL; INTRAMUSCULAR; SOFT TISSUE ONCE
Status: COMPLETED | OUTPATIENT
Start: 2022-02-28 | End: 2022-02-28

## 2022-02-28 RX ORDER — LIDOCAINE HYDROCHLORIDE 10 MG/ML
10 INJECTION, SOLUTION EPIDURAL; INFILTRATION; INTRACAUDAL; PERINEURAL ONCE
Status: COMPLETED | OUTPATIENT
Start: 2022-02-28 | End: 2022-02-28

## 2022-02-28 RX ADMIN — LIDOCAINE HYDROCHLORIDE 10 MG: 10 INJECTION, SOLUTION EPIDURAL; INFILTRATION; INTRACAUDAL; PERINEURAL at 08:30

## 2022-02-28 RX ADMIN — BETAMETHASONE SODIUM PHOSPHATE AND BETAMETHASONE ACETATE 6 MG: 3; 3 INJECTION, SUSPENSION INTRA-ARTICULAR; INTRALESIONAL; INTRAMUSCULAR; SOFT TISSUE at 08:30

## 2022-02-28 NOTE — PROGRESS NOTES
[facility]) Physicians  Neurosurgery and Pain [address] [facility] [place] [address]: [phone]  F: [phone]      Lumbar Radio Frequency Ablation     Provider: Natalia Adams DO          Patient Name: Marilyn Rodriguez : 1975        Date: 2022      Marilyn Rodriguez is here today for interventional pain management. Standard ASIPP guidelines were followed and sterile technique used. Area was cleaned with Betadine x3. Informed consent was obtained. Fluoroscopic guidance was used for this procedure. Multiple views of fluoroscopy were used during procedure to assist with needle placement. Appropriate sized RF 10mm active tip needle was used and advance to appropriate anatomic location. There was appropriate multifidus contraction noted with motor stimulation at 2 Hz between 0.5-1.5 volts. No limb or gluteal contraction was noted taking it up to 3.5 volts. Prior to lesioning at 80 degrees Celsius for 90 seconds, approximately 0.75mg/1mg of Celestone and ½ cc of 1% preservative free Lidocaine was injected. Impedance was between 200-500 ohms during the procedure. Patient tolerated the procedure well, no obvious complications occurred during the procedure. Patient was appropriately monitored and discharged home in stable condition with their usual motor strength. Post Op instructions were given to patient.           [] Bilateral [] T11 [] L1 [] S1     [] T12 [] L2 [] S2    [x] Right  [x] L3 [] S3      [x] L4 [] S4    [] Left  [x] L5                              Natalia Adams DO

## 2022-03-01 ENCOUNTER — OFFICE VISIT (OUTPATIENT)
Dept: FAMILY MEDICINE CLINIC | Age: 47
End: 2022-03-01
Payer: COMMERCIAL

## 2022-03-01 VITALS
HEART RATE: 105 BPM | WEIGHT: 233 LBS | OXYGEN SATURATION: 96 % | SYSTOLIC BLOOD PRESSURE: 122 MMHG | BODY MASS INDEX: 39.78 KG/M2 | DIASTOLIC BLOOD PRESSURE: 74 MMHG | HEIGHT: 64 IN

## 2022-03-01 DIAGNOSIS — E28.2 POLYCYSTIC OVARY SYNDROME: Primary | ICD-10-CM

## 2022-03-01 DIAGNOSIS — E66.09 CLASS 2 OBESITY DUE TO EXCESS CALORIES WITHOUT SERIOUS COMORBIDITY WITH BODY MASS INDEX (BMI) OF 38.0 TO 38.9 IN ADULT: ICD-10-CM

## 2022-03-01 PROCEDURE — 99213 OFFICE O/P EST LOW 20 MIN: CPT | Performed by: NURSE PRACTITIONER

## 2022-03-01 RX ORDER — PHENTERMINE HYDROCHLORIDE 37.5 MG/1
37.5 TABLET ORAL
Qty: 30 TABLET | Refills: 0 | Status: SHIPPED | OUTPATIENT
Start: 2022-03-01 | End: 2022-03-31

## 2022-03-01 ASSESSMENT — ENCOUNTER SYMPTOMS
SHORTNESS OF BREATH: 0
CONSTIPATION: 0
COUGH: 0
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
## 2022-03-14 ENCOUNTER — OFFICE VISIT (OUTPATIENT)
Dept: PAIN MANAGEMENT | Age: 47
End: 2022-03-14
Payer: COMMERCIAL

## 2022-03-14 DIAGNOSIS — M47.816 LUMBAR SPONDYLOSIS: Primary | ICD-10-CM

## 2022-03-14 PROCEDURE — 64635 DESTROY LUMB/SAC FACET JNT: CPT | Performed by: PAIN MEDICINE

## 2022-03-14 PROCEDURE — 64636 DESTROY L/S FACET JNT ADDL: CPT | Performed by: PAIN MEDICINE

## 2022-03-14 RX ORDER — BETAMETHASONE SODIUM PHOSPHATE AND BETAMETHASONE ACETATE 3; 3 MG/ML; MG/ML
6 INJECTION, SUSPENSION INTRA-ARTICULAR; INTRALESIONAL; INTRAMUSCULAR; SOFT TISSUE ONCE
Status: COMPLETED | OUTPATIENT
Start: 2022-03-14 | End: 2022-03-14

## 2022-03-14 RX ORDER — LIDOCAINE HYDROCHLORIDE 10 MG/ML
10 INJECTION, SOLUTION EPIDURAL; INFILTRATION; INTRACAUDAL; PERINEURAL ONCE
Status: COMPLETED | OUTPATIENT
Start: 2022-03-14 | End: 2022-03-14

## 2022-03-14 RX ADMIN — BETAMETHASONE SODIUM PHOSPHATE AND BETAMETHASONE ACETATE 6 MG: 3; 3 INJECTION, SUSPENSION INTRA-ARTICULAR; INTRALESIONAL; INTRAMUSCULAR; SOFT TISSUE at 09:50

## 2022-03-14 RX ADMIN — LIDOCAINE HYDROCHLORIDE 10 MG: 10 INJECTION, SOLUTION EPIDURAL; INFILTRATION; INTRACAUDAL; PERINEURAL at 09:50

## 2022-03-14 NOTE — PROGRESS NOTES
[facility]) Physicians  Neurosurgery and Pain [address] [facility] [place], [place]: [phone]  F: [phone]      Lumbar Radio Frequency Ablation     Provider: Bren Odom DO          Patient Name: Peter Hoffmann : 1975        Date: 3/14/2022      Peter Endo is here today for interventional pain management. Standard ASIPP guidelines were followed and sterile technique used. Area was cleaned with Betadine x3. Informed consent was obtained. Fluoroscopic guidance was used for this procedure. Multiple views of fluoroscopy were used during procedure to assist with needle placement. Appropriate sized RF 10mm active tip needle was used and advance to appropriate anatomic location. There was appropriate multifidus contraction noted with motor stimulation at 2 Hz between 0.5-1.5 volts. No limb or gluteal contraction was noted taking it up to 3.5 volts. Prior to lesioning at 80 degrees Celsius for 90 seconds, approximately 0.75mg/1mg of Celestone and ½ cc of 1% preservative free Lidocaine was injected. Impedance was between 200-500 ohms during the procedure. Patient tolerated the procedure well, no obvious complications occurred during the procedure. Patient was appropriately monitored and discharged home in stable condition with their usual motor strength. Post Op instructions were given to patient.           [] Bilateral [] T11 [] L1 [] S1     [] T12 [] L2 [] S2    [] Right  [x] L3 [] S3      [x] L4 [] S4    [x] Left  [x] L5                              Bren Odom DO

## 2022-03-29 ENCOUNTER — OFFICE VISIT (OUTPATIENT)
Dept: PAIN MANAGEMENT | Age: 47
End: 2022-03-29
Payer: COMMERCIAL

## 2022-03-29 VITALS
BODY MASS INDEX: 40.63 KG/M2 | WEIGHT: 238 LBS | DIASTOLIC BLOOD PRESSURE: 86 MMHG | TEMPERATURE: 97.9 F | SYSTOLIC BLOOD PRESSURE: 118 MMHG | HEIGHT: 64 IN

## 2022-03-29 DIAGNOSIS — M96.1 POSTLAMINECTOMY SYNDROME, LUMBAR REGION: ICD-10-CM

## 2022-03-29 PROCEDURE — 99213 OFFICE O/P EST LOW 20 MIN: CPT | Performed by: NURSE PRACTITIONER

## 2022-03-29 RX ORDER — CYCLOBENZAPRINE HCL 10 MG
10 TABLET ORAL NIGHTLY PRN
Qty: 30 TABLET | Refills: 2 | Status: SHIPPED | OUTPATIENT
Start: 2022-04-12 | End: 2022-07-11

## 2022-03-29 RX ORDER — LIDOCAINE 50 MG/G
1 PATCH TOPICAL DAILY
Qty: 30 PATCH | Refills: 2 | Status: SHIPPED | OUTPATIENT
Start: 2022-04-12 | End: 2022-07-11

## 2022-03-29 RX ORDER — BIOTIN 1 MG
TABLET ORAL
COMMUNITY

## 2022-03-29 RX ORDER — CYCLOBENZAPRINE HCL 10 MG
10 TABLET ORAL DAILY
COMMUNITY
End: 2022-08-23 | Stop reason: SDUPTHER

## 2022-03-29 RX ORDER — LIDOCAINE 50 MG/G
PATCH TOPICAL
COMMUNITY
Start: 2022-03-23

## 2022-03-29 ASSESSMENT — ENCOUNTER SYMPTOMS
COUGH: 0
EYES NEGATIVE: 1
DIARRHEA: 0
TROUBLE SWALLOWING: 0
BACK PAIN: 1
SHORTNESS OF BREATH: 0
CONSTIPATION: 0
ABDOMINAL PAIN: 1

## 2022-03-29 NOTE — PROGRESS NOTES
History   Problem Relation Age of Onset    Cancer Mother 47        breast & uterine cancer    Hypertension Mother     High Blood Pressure Mother    [facility] Breast Cancer Mother     Other Father         car accident at age 22    Cancer Maternal Grandmother         colon    Diabetes Paternal Grandfather     No Known Problems Sister     No Known Problems Daughter      Social History     Socioeconomic History    Marital status:      Spouse name: Not on file    Number of children: Not on file    Years of education: Not on file    Highest education level: Not on file   Occupational History    Not on file   Tobacco Use    Smoking status: Former Smoker     Packs/day: 0.50     Years: 10.00     Pack years: 5.00     Types: Cigarettes     Quit date: 2004     Years since quittin.3    Smokeless tobacco: Never Used   Vaping Use    Vaping Use: Never used   Substance and Sexual Activity    Alcohol use: Yes     Comment: socially    Drug use: No    Sexual activity: Yes   Other Topics Concern    Not on file   Social History Narrative    Not on file     Social Determinants of Health     Financial Resource Strain: Low Risk     Difficulty of Paying Living Expenses: Not hard at all   Food Insecurity: No Food Insecurity    Worried About [address] in the Last Year: Never true    [address]  in the Last Year: Never true   Transportation Needs: No Transportation Needs    Lack of Transportation (Medical): No    Lack of Transportation (Non-Medical):  No   Physical Activity:     Days of Exercise per Week: Not on file    Minutes of Exercise per Session: Not on file   Stress:     Feeling of Stress : Not on file   Social Connections:     Frequency of Communication with Friends and Family: Not on file    Frequency of Social Gatherings with Friends and Family: Not on file    Attends Bahai Services: Not on file    Active Member of Clubs or Organizations: Not on file    Attends Club or Organization Meetings: Not on file    Marital Status: Not on file   Intimate Partner Violence:     Fear of Current or Ex-Partner: Not on file    Emotionally Abused: Not on file    Physically Abused: Not on file    Sexually Abused: Not on file   Housing Stability:     Unable to Pay for Housing in the Last Year: Not on file    Number of [place] in the Last Year: Not on file    Unstable Housing in the Last Year: Not on file       Current Outpatient Medications on File Prior to Visit   Medication Sig Dispense Refill    Biotin 1000 MCG TABS Take by mouth      cyclobenzaprine (FLEXERIL) 10 MG tablet Take 10 mg by mouth daily      lidocaine (LIDODERM) 5 %       phentermine (ADIPEX-P) 37.5 MG tablet Take 1 tablet by mouth every morning (before breakfast) for 30 days. 30 tablet 0    fluticasone (FLONASE) 50 MCG/ACT nasal spray 1 spray by Each Nostril route daily 1 Bottle 1    acetaminophen (TYLENOL) 325 MG tablet Take 650 mg by mouth every 6 hours as needed for Pain      cetirizine (ZYRTEC) 10 MG tablet Take 10 mg by mouth daily as needed        No current facility-administered medications on file prior to visit. Pt presents today for a f/u of her pain. PCP is Rebecca Matos CNP. Patient last saw Dr. Pineda Briseno on 3/14/2022 for left L3-4-5 RF ablation. On 2/28/2022 the right side was done. She says she \"did well\" and feels like her pain is \"more tolerable\" and says >50%. Able to move better. Sleeping still issue with cannot lay on one side too long. She did see Dr. Lloyd Sandhoff on 2/22/2022. She has a dorsum, stimulator that was placed on 4/27/2021 by Dr. Janeth Mosqueda. History of 3 back surgeries. History of ACDF C5-6 with Dr. Apollo Wade. She has a known history of degenerative disc disease. Dr. Ritchie Bonilla note x-rays with flexion-extension views does not show any evidence of instability.   MRI of the lumbar spine report dated 1/19/2022 shows bulging disc at L2-3-4 with a small protrusion to the right which is the opposite side of her pain. She has chronic low back and BL leg pain. Insurance would not cover diclofenac gel. EMG BLE 1/13/2022 was limited but normal.  SCS helps. She continues to have some weakness in Lt leg without change. She continues to have some numbness in leg. Says she does use her SCS which does help.        PSH: large herniated disc at L3-4 - surgery in 2001. She had a recurrent disc herniation- surgery 2005 with Dr Murray Fernandez. ACDF A346 10/2016 with Dr Murray Fernandez. MRI lumbar spine 1/19/2022 shows neurostimulator, L1-2 cyst; L2-3 mild to moderate canal stenosis; L3-4 mild canal and mild to moderate right to left stenosis; L5-S1 minimal disc bulge, no stenosis; incidental findings on localizer include C5-6 fusion and multiple ovarian cysts. Reviewed with patient. She previously was aware of cysts.        Patient has history of ACDF C5-6 in 2016 by Dr. Murray Fernandez a total of 3 lumbar surgeries most recently in 2006, DCS placement 4/27/2021. She is using some OTCs, aleve and tylenol. Gabapentin caused weight gain.      Recent injections/procedures:  1/25/22 BL L3-4 MARSHA  11/23/21 L L4-5 MARSHA  7/20/20 BL L4-5 MARSHA      Lidoderm patches and Flexeril 10mg QHS PRN as well as diclofenac 50 mg twice daily ordered in February with refills - feels this has been helping. She has used old tramadol recently. Pt feels pain level 2/10. Pt feels that laying on Lt, weather change, stair climbing, too much activity makes the pain worse, and medication, RF ablation, occasionally heating pad, getting off feet/reclining makes the pain better. Pt feels her medication helps   her function and improve her quality of life. Pt admits to Lt lateral leg to foot with radiating numbness and tingling. Denies recent falls, injuries or trauma. Pt denies new weakness. Pt reports PT has been done. Review of Systems   Constitutional: Negative. Negative for fatigue. HENT: Negative. Negative for trouble swallowing.     Eyes: Negative. Respiratory: Negative for cough and shortness of breath. Cardiovascular: Negative for chest pain. Gastrointestinal: Positive for abdominal pain. Negative for constipation and diarrhea. Endocrine: Negative. Genitourinary: Negative. Musculoskeletal: Positive for back pain. Skin: Negative. Neurological: Positive for numbness. Negative for dizziness, weakness and headaches. Hematological: Negative. Psychiatric/Behavioral: Negative. Objective:     Vitals:  /86 (Site: Left Upper Arm, Position: Sitting, Cuff Size: Large Adult)   Temp 97.9 °F (36.6 °C) (Infrared)   Ht 5' 4\" (1.626 m)   Wt 238 lb (108 kg) Comment: with shoes  LMP 03/05/2016   BMI 40.85 kg/m² Pain Score:   2      Physical Exam  Vitals and nursing note reviewed. This is a pleasant female who answers questions appropriately and follows commands. Pt is alert and oriented x 3.  Recent and remote memory is intact.  Mood and affect, judgement and insight are normal.  No signs of distress, no dyspnea or SOB noted. HEENT: PERRL.  Neck is supple, trachea midline.  No lymphadenopathy noted.  Decreased ROM with flexion, extension and rotation of cervical spine. Tightness in trapezius with palpation noted.   Strong grasp B/L. Strength is functional in UE bilaterally.  Pulses are intact.  Scar noted. Decreased ROM with flexion and extension of low back.  Non-tender with palpation to low back today.  SLR on Lt increases low back pain.  Tightness in both hamstrings noted.  Pt is able to briefly heel walk and toe walk with pain on Lt - Weakness with ADF on Lt noted.  Surgical scars noted.  Balance and coordination normal.  Strength is functional for ambulation. Cranial nerves II-XII are intact. Assessment:      Diagnosis Orders   1.  Postlaminectomy syndrome, lumbar region  diclofenac (VOLTAREN) 50 MG EC tablet    cyclobenzaprine (FLEXERIL) 10 MG tablet    lidocaine (LIDODERM) 5 %       Plan:          Orders Placed This Encounter   Medications    diclofenac (VOLTAREN) 50 MG EC tablet     Sig: Take 1 tablet by mouth 3 times daily (with meals)     Dispense:  60 tablet     Refill:  2    cyclobenzaprine (FLEXERIL) 10 MG tablet     Sig: Take 1 tablet by mouth nightly as needed for Muscle spasms     Dispense:  30 tablet     Refill:  2    lidocaine (LIDODERM) 5 %     Sig: Place 1 patch onto the skin daily 12 hours on, 12 hours off. Dispense:  30 patch     Refill:  2       No orders of the defined types were placed in this encounter. Discussed options with the patient today. Anatomic model pathology was shown and reviewed with pt. She did well s/p RF ablation - hopefully this is lasting. Continue diclofenac 50 mg up to 2-3 daily with food. Continue Flexeril 10 mg nightly and use Lidoderm patches 5% every 12 hours on 12 hours off as discussed. Refills given. Discussed taking holiday from diclofenac in the future. All questions were answered. Discussed home exercise program.  Relevant imaging and pain generators reviewed. Pt verbalized understanding and agrees with above plan. Pt has chronic pain. Will continue medications for chronic pain that has been previously directed as they do help pt function with ADL and improve quality of life. OARRS was reviewed. This NP saw pt under direct supervision of Dr. Ev Stephens. Follow up:  Return in about 4 months (around 7/29/2022) for review meds and reassess pain.     Yesenia Garcia, MAURICIO - CNP

## 2022-05-02 ENCOUNTER — OFFICE VISIT (OUTPATIENT)
Dept: FAMILY MEDICINE CLINIC | Age: 47
End: 2022-05-02
Payer: COMMERCIAL

## 2022-05-02 VITALS
SYSTOLIC BLOOD PRESSURE: 122 MMHG | HEIGHT: 64 IN | OXYGEN SATURATION: 98 % | DIASTOLIC BLOOD PRESSURE: 68 MMHG | WEIGHT: 244 LBS | BODY MASS INDEX: 41.66 KG/M2 | HEART RATE: 93 BPM

## 2022-05-02 DIAGNOSIS — E28.2 POLYCYSTIC OVARY SYNDROME: Primary | ICD-10-CM

## 2022-05-02 DIAGNOSIS — E66.09 CLASS 2 OBESITY DUE TO EXCESS CALORIES WITHOUT SERIOUS COMORBIDITY WITH BODY MASS INDEX (BMI) OF 38.0 TO 38.9 IN ADULT: ICD-10-CM

## 2022-05-02 PROCEDURE — 99213 OFFICE O/P EST LOW 20 MIN: CPT | Performed by: NURSE PRACTITIONER

## 2022-05-02 RX ORDER — SEMAGLUTIDE 0.25 MG/.5ML
0.25 INJECTION, SOLUTION SUBCUTANEOUS
Qty: 2 ML | Refills: 0 | Status: SHIPPED | OUTPATIENT
Start: 2022-05-02 | End: 2022-06-02 | Stop reason: SDUPTHER

## 2022-05-02 ASSESSMENT — ENCOUNTER SYMPTOMS
COUGH: 0
DIARRHEA: 0
SHORTNESS OF BREATH: 0
CONSTIPATION: 0
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
## 2022-05-10 ENCOUNTER — TELEPHONE (OUTPATIENT)
Dept: FAMILY MEDICINE CLINIC | Age: 47
End: 2022-05-10

## 2022-05-10 NOTE — TELEPHONE ENCOUNTER
Patient called saying that [facility] pharmacy sent script authorization for Semaglutide-Weight management injection last week but pharmacy has not yet received an answer.      Patient uses [address]    Patient [id number]

## 2022-06-02 ENCOUNTER — OFFICE VISIT (OUTPATIENT)
Dept: FAMILY MEDICINE CLINIC | Age: 47
End: 2022-06-02
Payer: COMMERCIAL

## 2022-06-02 VITALS
OXYGEN SATURATION: 98 % | HEIGHT: 64 IN | SYSTOLIC BLOOD PRESSURE: 114 MMHG | HEART RATE: 107 BPM | DIASTOLIC BLOOD PRESSURE: 74 MMHG | BODY MASS INDEX: 41.15 KG/M2 | WEIGHT: 241 LBS

## 2022-06-02 DIAGNOSIS — E28.2 POLYCYSTIC OVARY SYNDROME: Primary | ICD-10-CM

## 2022-06-02 DIAGNOSIS — E66.09 CLASS 2 OBESITY DUE TO EXCESS CALORIES WITHOUT SERIOUS COMORBIDITY WITH BODY MASS INDEX (BMI) OF 38.0 TO 38.9 IN ADULT: ICD-10-CM

## 2022-06-02 PROCEDURE — 99213 OFFICE O/P EST LOW 20 MIN: CPT | Performed by: NURSE PRACTITIONER

## 2022-06-02 RX ORDER — SEMAGLUTIDE 0.25 MG/.5ML
0.25 INJECTION, SOLUTION SUBCUTANEOUS
Qty: 2 ML | Refills: 0 | Status: SHIPPED | OUTPATIENT
Start: 2022-06-02 | End: 2022-08-30

## 2022-06-02 ASSESSMENT — ENCOUNTER SYMPTOMS
DIARRHEA: 0
SHORTNESS OF BREATH: 0
CONSTIPATION: 0
COUGH: 0
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
## 2022-08-23 ENCOUNTER — OFFICE VISIT (OUTPATIENT)
Dept: PAIN MANAGEMENT | Age: 47
End: 2022-08-23
Payer: COMMERCIAL

## 2022-08-23 VITALS
WEIGHT: 237 LBS | HEIGHT: 64 IN | TEMPERATURE: 97.1 F | BODY MASS INDEX: 40.46 KG/M2 | SYSTOLIC BLOOD PRESSURE: 110 MMHG | DIASTOLIC BLOOD PRESSURE: 70 MMHG

## 2022-08-23 DIAGNOSIS — M96.1 POSTLAMINECTOMY SYNDROME, LUMBAR REGION: ICD-10-CM

## 2022-08-23 DIAGNOSIS — M47.816 LUMBAR SPONDYLOSIS: Primary | ICD-10-CM

## 2022-08-23 PROCEDURE — 99213 OFFICE O/P EST LOW 20 MIN: CPT | Performed by: NURSE PRACTITIONER

## 2022-08-23 RX ORDER — CYCLOBENZAPRINE HCL 10 MG
10 TABLET ORAL DAILY
Qty: 30 TABLET | Refills: 2 | Status: SHIPPED | OUTPATIENT
Start: 2022-08-23 | End: 2022-11-21

## 2022-08-23 ASSESSMENT — ENCOUNTER SYMPTOMS
EYES NEGATIVE: 1
GASTROINTESTINAL NEGATIVE: 1
DIARRHEA: 0
COUGH: 0
TROUBLE SWALLOWING: 0
CONSTIPATION: 0
SHORTNESS OF BREATH: 0
BACK PAIN: 1

## 2022-08-23 NOTE — PROGRESS NOTES
Reyna Leon  (1975)    8/23/2022    Subjective:     Reyna Leon is 49 y.o. female who complains today of:    Chief Complaint   Patient presents with    Back Pain         Allergies:  Adhesive tape, Macrobid [nitrofurantoin monohydrate macrocrystals], and Sulfa antibiotics    Past Medical History:   Diagnosis Date    Anxiety and depression     Arthritis     Asthma     childhood only    Chronic back pain     Depression     Gastrointestinal problem     Irritable bowel syndrome     Mental disorder     PCOS (polycystic ovarian syndrome)     Urinary incontinence     stress     Past Surgical History:   Procedure Laterality Date    BACK SURGERY  2001,2005,2006    lumbar microdiscectomy x 3 -- last 2006    BACK SURGERY  2016    cervical fusion     BLADDER SUSPENSION  2013    BREAST BIOPSY Bilateral 1/16/13    U/S Guided core bx of 2 solid nodules int he right and left breast    CERVICAL FUSION N/A 10/28/2016    ACDF ANTERIOR CERVICAL DISKECTOMY FUSION C5-6 USING CC TRIAD HELIX MINI-PLATE, 1.5 HRS, [place] REP  performed by Freida Radford MD at [address] 2007    CHOLECYSTECTOMY  2005    COLONOSCOPY      ELBOW SURGERY      pins in and out, pt broke tip of elbow    ENDOMETRIAL ABLATION  2014    ENDOSCOPY, COLON, DIAGNOSTIC      FRACTURE SURGERY  1985    fx / left elbow / pins in & then removed    [address]  12/2018    Repair recurrent VH with mesh    HYSTERECTOMY, TOTAL ABDOMINAL (CERVIX REMOVED)  04/07/16    DR RAYA    NERVE SURGERY N/A 4/27/2021    PERMANENT DORSAL COLUMN STIMULATOR PLACEMENT [facility]  [facility], 2005, 2006    lumbar    STIMULATOR SURGERY N/A 3/11/2021    DORSAL COLUMN STIMULATOR TRIAL performed by Kamla Luna MD at [address]      at age 39    [address]  10/20/15    Emperatriz Covington MD    [facility] N/A 12/13/2018    With mesh     Family History Problem Relation Age of Onset    Cancer Mother 47        breast & uterine cancer    Hypertension Mother     High Blood Pressure Mother     Breast Cancer Mother     Other Father         car accident at age 22    Cancer Maternal Grandmother         colon    Diabetes Paternal Grandfather     No Known Problems Sister     No Known Problems Daughter      Social History     Socioeconomic History    Marital status:      Spouse name: Not on file    Number of children: Not on file    Years of education: Not on file    Highest education level: Not on file   Occupational History    Not on file   Tobacco Use    Smoking status: Former     Packs/day: 0.50     Years: 10.00     Pack years: 5.00     Types: Cigarettes     Quit date: 2004     Years since quittin.7    Smokeless tobacco: Never   Vaping Use    Vaping Use: Never used   Substance and Sexual Activity    Alcohol use: Yes     Comment: socially    Drug use: No    Sexual activity: Yes   Other Topics Concern    Not on file   Social History Narrative    Not on file     Social Determinants of Health     Financial Resource Strain: Not on file   Food Insecurity: Not on file   Transportation Needs: Not on file   Physical Activity: Not on file   Stress: Not on file   Social Connections: Not on file   Intimate Partner Violence: Not on file   Housing Stability: Not on file       Current Outpatient Medications on File Prior to Visit   Medication Sig Dispense Refill    Semaglutide-Weight Management (WEGOVY) 0.25 MG/0.5ML SOAJ SC injection Inject 0.25 mg into the skin every 7 days 2 mL 0    Biotin 1000 MCG TABS Take by mouth      lidocaine (LIDODERM) 5 %       fluticasone (FLONASE) 50 MCG/ACT nasal spray 1 spray by Each Nostril route daily 1 Bottle 1    acetaminophen (TYLENOL) 325 MG tablet Take 650 mg by mouth every 6 hours as needed for Pain      cetirizine (ZYRTEC) 10 MG tablet Take 10 mg by mouth daily as needed        No current facility-administered medications on file prior to visit. Pt presents today for a f/u of her pain. PCP is Sharon Kwong CNP. Patient last saw this NP. Overall the whole summer \"I haven't really had a lot of pain\". She says she uses her flexeril and says it really helps on the days she over does it. Yard work can aggrevate her pain. Dr. Otilio Rick on 3/14/2022 for left L3-4-5 RF ablation. On 2/28/2022 the right side was done with significant relief. Sleeping overall if better. She did see Dr. Yue Palacio on 2/22/2022. She has a dorsum, stimulator that was placed on 4/27/2021 by Dr. Evie Joyce. History of 3 back surgeries. History of ACDF C5-6 with Dr. Shellie Collins. She has a known history of degenerative disc disease. Dr. Eric Gaviria note x-rays with flexion-extension views does not show any evidence of instability. MRI of the lumbar spine report dated 1/19/2022 shows bulging disc at L2-3-4 with a small protrusion to the right which is the opposite side of her pain. Recently started on wegovy from PCP to help with weight loss. She has chronic low back and BL leg pain. Insurance would not cover diclofenac gel. EMG BLE 1/13/2022 was limited but normal.  SCS helps. She continues to have some weakness in Lt leg without change. She continues to have some numbness in leg. Says she does use her SCS which does help. PSH: large herniated disc at L3-4 - surgery in 2001. She had a recurrent disc herniation- surgery 2005 with Dr Shellie Collins. ACDF O528 10/2016 with Dr Shellie Collins. MRI lumbar spine 1/19/2022 shows neurostimulator, L1-2 cyst; L2-3 mild to moderate canal stenosis; L3-4 mild canal and mild to moderate right to left stenosis; L5-S1 minimal disc bulge, no stenosis; incidental findings on localizer include C5-6 fusion and multiple ovarian cysts. Reviewed with patient. She previously was aware of cysts. Patient has history of ACDF C5-6 in 2016 by Dr. Shellie Collins a total of 3 lumbar surgeries most recently in 2006, DCS placement 4/27/2021.  She is using some OTCs, aleve and tylenol. Gabapentin caused weight gain. Recent injections/procedures:  1/25/22 BL L3-4 MARSHA  11/23/21 L L4-5 MARSHA  7/20/20 BL L4-5 MARSHA      Lidoderm patches and Flexeril 10mg QHS PRN as well as diclofenac 50 mg twice daily ordered in March with refills - feels this has been helping. She says she hasn't really used lidoderm or diclofenac recently and says \"I've been great\". Pt feels pain level 1/10. Pt feels that weather change, yard work makes the pain worse, and warm weather, heating pad, rest/getting off feet, RF ablation makes the pain better. Pt admits to occasional LE radiating numbness and tingling when pain in back bad. Denies recent falls, injuries or trauma. Pt denies new weakness. Pt reports PT has been done. Review of Systems   Constitutional: Negative. Negative for fatigue. HENT: Negative. Negative for trouble swallowing. Eyes: Negative. Respiratory:  Negative for cough and shortness of breath. Cardiovascular:  Negative for chest pain. Gastrointestinal: Negative. Negative for constipation and diarrhea. Endocrine: Negative. Genitourinary: Negative. Musculoskeletal:  Positive for back pain. Skin: Negative. Neurological:  Negative for dizziness, weakness and headaches. Hematological: Negative. Psychiatric/Behavioral: Negative. Objective:     Vitals:  /70   Temp 97.1 °F (36.2 °C)   Ht 5' 4\" (1.626 m)   Wt 237 lb (107.5 kg)   LMP 03/05/2016   BMI 40.68 kg/m² Pain Score:   1      Physical Exam  Vitals and nursing note reviewed. This is a pleasant female who answers questions appropriately and follows commands. Pt is alert and oriented x 3. Recent and remote memory is intact. Mood and affect, judgement and insight are normal.  No signs of distress, no dyspnea or SOB noted. HEENT: PERRL. Neck is supple, trachea midline. No lymphadenopathy noted.   Decreased ROM with flexion, extension and rotation of cervical spine. Tightness in trapezius with palpation noted. Strong grasp B/L. Strength is functional in UE bilaterally. Pulses are intact. Scar noted. Decreased ROM with flexion and extension of low back. Non-tender with palpation to low back today. SLR on Lt increases low back pain. Tightness in both hamstrings noted. Pt is able to briefly heel walk and toe walk with pain on Lt - Weakness with ADF on Lt noted without change. Surgical scars noted. Balance and coordination normal.  Strength is functional for ambulation. Cranial nerves II-XII are intact. Assessment:      Diagnosis Orders   1. Lumbar spondylosis  diclofenac (VOLTAREN) 50 MG EC tablet    cyclobenzaprine (FLEXERIL) 10 MG tablet      2. Postlaminectomy syndrome, lumbar region  diclofenac (VOLTAREN) 50 MG EC tablet    cyclobenzaprine (FLEXERIL) 10 MG tablet          Plan:          Orders Placed This Encounter   Medications    diclofenac (VOLTAREN) 50 MG EC tablet     Sig: Take 1 tablet by mouth 2 times daily     Dispense:  60 tablet     Refill:  0    cyclobenzaprine (FLEXERIL) 10 MG tablet     Sig: Take 1 tablet by mouth daily     Dispense:  30 tablet     Refill:  2         No orders of the defined types were placed in this encounter. Discussed options with the patient today. Anatomic model pathology was shown and reviewed with pt. She is doing well s/p RF ablation still. Will continue diclofenac 50mg BID PRN pain to use sparingly and flexeril 10mg daily PRN with refills. F/U if pain worsens. All questions were answered. Discussed home exercise program.  Relevant imaging and pain generators reviewed. Pt verbalized understanding and agrees with above plan. Pt has chronic pain. OARRS was reviewed. This NP saw pt under direct supervision of Dr. Deonna Gaona. Follow up:  Return if symptoms worsen or fail to improve.     Monalisa Garcia, APRN - CNP

## 2022-08-30 ENCOUNTER — OFFICE VISIT (OUTPATIENT)
Dept: FAMILY MEDICINE CLINIC | Age: 47
End: 2022-08-30
Payer: COMMERCIAL

## 2022-08-30 VITALS
SYSTOLIC BLOOD PRESSURE: 122 MMHG | HEIGHT: 64 IN | WEIGHT: 240 LBS | DIASTOLIC BLOOD PRESSURE: 76 MMHG | HEART RATE: 81 BPM | OXYGEN SATURATION: 97 % | BODY MASS INDEX: 40.97 KG/M2

## 2022-08-30 DIAGNOSIS — E28.2 POLYCYSTIC OVARY SYNDROME: Primary | ICD-10-CM

## 2022-08-30 DIAGNOSIS — E66.01 CLASS 3 SEVERE OBESITY DUE TO EXCESS CALORIES WITH SERIOUS COMORBIDITY AND BODY MASS INDEX (BMI) OF 40.0 TO 44.9 IN ADULT (HCC): ICD-10-CM

## 2022-08-30 PROCEDURE — 99213 OFFICE O/P EST LOW 20 MIN: CPT | Performed by: NURSE PRACTITIONER

## 2022-08-30 RX ORDER — SEMAGLUTIDE 0.5 MG/.5ML
0.5 INJECTION, SOLUTION SUBCUTANEOUS
Qty: 2 ML | Refills: 5 | Status: SHIPPED | OUTPATIENT
Start: 2022-08-30 | End: 2022-09-26

## 2022-08-30 SDOH — ECONOMIC STABILITY: FOOD INSECURITY: WITHIN THE PAST 12 MONTHS, YOU WORRIED THAT YOUR FOOD WOULD RUN OUT BEFORE YOU GOT MONEY TO BUY MORE.: NEVER TRUE

## 2022-08-30 SDOH — ECONOMIC STABILITY: FOOD INSECURITY: WITHIN THE PAST 12 MONTHS, THE FOOD YOU BOUGHT JUST DIDN'T LAST AND YOU DIDN'T HAVE MONEY TO GET MORE.: NEVER TRUE

## 2022-08-30 ASSESSMENT — ENCOUNTER SYMPTOMS
SHORTNESS OF BREATH: 0
NAUSEA: 0
COUGH: 0
ABDOMINAL PAIN: 0

## 2022-08-30 ASSESSMENT — SOCIAL DETERMINANTS OF HEALTH (SDOH): HOW HARD IS IT FOR YOU TO PAY FOR THE VERY BASICS LIKE FOOD, HOUSING, MEDICAL CARE, AND HEATING?: NOT HARD AT ALL
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
## 2022-10-06 ENCOUNTER — OFFICE VISIT (OUTPATIENT)
Dept: FAMILY MEDICINE CLINIC | Age: 47
End: 2022-10-06
Payer: COMMERCIAL

## 2022-10-06 VITALS
BODY MASS INDEX: 42 KG/M2 | HEART RATE: 88 BPM | DIASTOLIC BLOOD PRESSURE: 64 MMHG | WEIGHT: 246 LBS | SYSTOLIC BLOOD PRESSURE: 128 MMHG | OXYGEN SATURATION: 98 % | HEIGHT: 64 IN

## 2022-10-06 DIAGNOSIS — Z23 NEED FOR INFLUENZA VACCINATION: ICD-10-CM

## 2022-10-06 DIAGNOSIS — E66.01 CLASS 3 SEVERE OBESITY DUE TO EXCESS CALORIES WITH SERIOUS COMORBIDITY AND BODY MASS INDEX (BMI) OF 40.0 TO 44.9 IN ADULT (HCC): ICD-10-CM

## 2022-10-06 DIAGNOSIS — E28.2 POLYCYSTIC OVARY SYNDROME: Primary | ICD-10-CM

## 2022-10-06 DIAGNOSIS — Z13.220 LIPID SCREENING: ICD-10-CM

## 2022-10-06 DIAGNOSIS — E28.2 POLYCYSTIC OVARY SYNDROME: ICD-10-CM

## 2022-10-06 LAB
ALBUMIN SERPL-MCNC: 4.8 G/DL (ref 3.5–4.6)
ALP BLD-CCNC: 67 U/L (ref 40–130)
ALT SERPL-CCNC: 36 U/L (ref 0–33)
ANION GAP SERPL CALCULATED.3IONS-SCNC: 16 MEQ/L (ref 9–15)
AST SERPL-CCNC: 23 U/L (ref 0–35)
BILIRUB SERPL-MCNC: 0.3 MG/DL (ref 0.2–0.7)
BUN BLDV-MCNC: 13 MG/DL (ref 6–20)
CALCIUM SERPL-MCNC: 9.8 MG/DL (ref 8.5–9.9)
CHLORIDE BLD-SCNC: 103 MEQ/L (ref 95–107)
CHOLESTEROL, TOTAL: 213 MG/DL (ref 0–199)
CO2: 21 MEQ/L (ref 20–31)
CREAT SERPL-MCNC: 0.64 MG/DL (ref 0.5–0.9)
GFR AFRICAN AMERICAN: >60
GFR NON-AFRICAN AMERICAN: >60
GLOBULIN: 2.7 G/DL (ref 2.3–3.5)
GLUCOSE BLD-MCNC: 68 MG/DL (ref 70–99)
HDLC SERPL-MCNC: 58 MG/DL (ref 40–59)
LDL CHOLESTEROL CALCULATED: 135 MG/DL (ref 0–129)
POTASSIUM SERPL-SCNC: 4.1 MEQ/L (ref 3.4–4.9)
SODIUM BLD-SCNC: 140 MEQ/L (ref 135–144)
TOTAL PROTEIN: 7.5 G/DL (ref 6.3–8)
TRIGL SERPL-MCNC: 102 MG/DL (ref 0–150)

## 2022-10-06 PROCEDURE — 90674 CCIIV4 VAC NO PRSV 0.5 ML IM: CPT | Performed by: NURSE PRACTITIONER

## 2022-10-06 PROCEDURE — 99213 OFFICE O/P EST LOW 20 MIN: CPT | Performed by: NURSE PRACTITIONER

## 2022-10-06 PROCEDURE — 90471 IMMUNIZATION ADMIN: CPT | Performed by: NURSE PRACTITIONER

## 2022-10-06 RX ORDER — SEMAGLUTIDE 1.7 MG/.75ML
1.7 INJECTION, SOLUTION SUBCUTANEOUS
Qty: 3 ML | Refills: 2 | Status: SHIPPED | OUTPATIENT
Start: 2022-10-06

## 2022-10-06 NOTE — PROGRESS NOTES
Vaccine Information Sheet, \"Influenza - Inactivated\"  given to Nito Shane, or parent/legal guardian of  Nito Shane and verbalized understanding. Patient responses:    Have you ever had a reaction to a flu vaccine? No  Are you able to eat eggs without adverse effects? Yes  Do you have any current illness? No  Have you ever had Guillian Parchman Syndrome? No    Flu vaccine given per order. Please see immunization tab. After obtaining consent, and per orders of Dr. Krys Warren, injection of FLU given in Left deltoid by Matias Black MA. Patient instructed to remain in clinic for 20 minutes afterwards, and to report any adverse reaction to me immediately.

## 2022-10-06 NOTE — PROGRESS NOTES
10/28/2016    ACDF ANTERIOR CERVICAL DISKECTOMY FUSION C5-6 USING CC TRIAD HELIX MINI-PLATE, 1.5 HRS, Angle Lozano REP  performed by Uche Ricardo MD at [address]     CHOLECYSTECTOMY  2005    COLONOSCOPY      ELBOW SURGERY      pins in and out, pt broke tip of elbow    ENDOMETRIAL ABLATION      ENDOSCOPY, COLON, DIAGNOSTIC      FRACTURE SURGERY  1985    fx / left elbow / pins in & then removed    [address]  2018    Repair recurrent VH with mesh    HYSTERECTOMY, TOTAL ABDOMINAL (CERVIX REMOVED)  16    DR RAYA    NERVE SURGERY N/A 2021    PERMANENT DORSAL COLUMN STIMULATOR PLACEMENT [facility]  , , [facility]    lumbar    STIMULATOR SURGERY N/A 3/11/2021    DORSAL COLUMN STIMULATOR TRIAL performed by Joanne Elder MD at [address]      at age 39    [address]  10/20/15    Altaf Juárez MD    [facility] N/A 2018    With mesh     Family History   Problem Relation Age of Onset    Cancer Mother 47        breast & uterine cancer    Hypertension Mother     High Blood Pressure Mother     Breast Cancer Mother     Other Father         car accident at age 22    Cancer Maternal Grandmother         colon    Diabetes Paternal Grandfather     No Known Problems Sister     No Known Problems Daughter      Social History     Socioeconomic History    Marital status:      Spouse name: None    Number of children: None    Years of education: None    Highest education level: None   Tobacco Use    Smoking status: Former     Packs/day: 0.50     Years: 10.00     Pack years: 5.00     Types: Cigarettes     Quit date: 2004     Years since quittin.9    Smokeless tobacco: Never   Vaping Use    Vaping Use: Never used   Substance and Sexual Activity    Alcohol use: Yes     Comment: socially    Drug use: No    Sexual activity: Yes     Social Determinants of Health Financial Resource Strain: Low Risk     Difficulty of Paying Living Expenses: Not hard at all   Food Insecurity: No Food Insecurity    Worried About Running Out of Food in the Last Year: Never true    Ran Out of Food in the Last Year: Never true     Current Outpatient Medications on File Prior to Visit   Medication Sig Dispense Refill    cyclobenzaprine (FLEXERIL) 10 MG tablet Take 1 tablet by mouth daily 30 tablet 2    Biotin 1000 MCG TABS Take by mouth      lidocaine (LIDODERM) 5 %       fluticasone (FLONASE) 50 MCG/ACT nasal spray 1 spray by Each Nostril route daily 1 Bottle 1    acetaminophen (TYLENOL) 325 MG tablet Take 650 mg by mouth every 6 hours as needed for Pain      cetirizine (ZYRTEC) 10 MG tablet Take 10 mg by mouth daily as needed       diclofenac (VOLTAREN) 50 MG EC tablet Take 1 tablet by mouth 2 times daily 60 tablet 0     No current facility-administered medications on file prior to visit. Allergies   Allergen Reactions    Adhesive Tape Rash    Macrobid [Nitrofurantoin Monohydrate Macrocrystals] Rash    Sulfa Antibiotics Rash       Review of Systems   Constitutional:  Positive for appetite change. All other systems reviewed and are negative. Objective  Vitals:    10/06/22 0813   BP: 128/64   Pulse: 88   SpO2: 98%   Weight: 246 lb (111.6 kg)   Height: 5' 4\" (1.626 m)     Physical Exam  Vitals reviewed. Constitutional:       Appearance: Normal appearance. HENT:      Head: Normocephalic and atraumatic. Cardiovascular:      Rate and Rhythm: Normal rate and regular rhythm. Pulses: Normal pulses. Heart sounds: Normal heart sounds. Pulmonary:      Effort: Pulmonary effort is normal.      Breath sounds: Normal breath sounds. Musculoskeletal:         General: Normal range of motion. Cervical back: Normal range of motion and neck supple. Skin:     General: Skin is warm and dry. Neurological:      General: No focal deficit present.       Mental Status: She is alert and oriented to person, place, and time. Mental status is at baseline. Psychiatric:         Mood and Affect: Mood normal.         Behavior: Behavior normal.       Assessment & Plan     Diagnosis Orders   1. Class 3 severe obesity due to excess calories with serious comorbidity and body mass index (BMI) of 40.0 to 44.9 in adult (HCC)  Semaglutide-Weight Management (WEGOVY) 1.7 MG/0.75ML SOAJ SC injection      2. Polycystic ovary syndrome  Comprehensive Metabolic Panel    Semaglutide-Weight Management (WEGOVY) 1.7 MG/0.75ML SOAJ SC injection      3. Lipid screening  Lipid Panel      4. Need for influenza vaccination  Influenza, FLUCELVAX, (age 10 mo+), IM, Preservative Free, 0.5 mL          Orders Placed This Encounter   Procedures    Influenza, FLUCELVAX, (age 10 mo+), IM, Preservative Free, 0.5 mL    Comprehensive Metabolic Panel     Standing Status:   Future     Standing Expiration Date:   10/6/2023    Lipid Panel     Standing Status:   Future     Standing Expiration Date:   10/6/2023     Order Specific Question:   Is Patient Fasting?/# of Hours     Answer:   15     Order Specific Question:   Has the patient fasted? Answer:   Yes       Orders Placed This Encounter   Medications    Semaglutide-Weight Management (WEGOVY) 1.7 MG/0.75ML SOAJ SC injection     Sig: Inject 1.7 mg into the skin every 7 days     Dispense:  3 mL     Refill:  2       Medications Discontinued During This Encounter   Medication Reason    Semaglutide-Weight Management (Ellijase Mosqueda) 1 MG/0.5ML SOAJ SC injection LIST CLEANUP      Side effects, adverse effects of the medication prescribed today, as well as treatment plan/ rationale and result expectations have been discussed with the patient who expresses understanding and desires to proceed. Close follow up to evaluate treatment results and for coordination of care. I have reviewed the patient's medical history in detail and updated the computerized patient record.     As always, patient is advised that if symptoms worsen in any way they will proceed to the nearest emergency room. Return in about 1 month (around 11/6/2022).       Bettina Finn, MAURICIO - CNP

## 2022-11-08 ENCOUNTER — OFFICE VISIT (OUTPATIENT)
Dept: FAMILY MEDICINE CLINIC | Age: 47
End: 2022-11-08
Payer: COMMERCIAL

## 2022-11-08 VITALS
DIASTOLIC BLOOD PRESSURE: 86 MMHG | HEART RATE: 88 BPM | HEIGHT: 64 IN | SYSTOLIC BLOOD PRESSURE: 134 MMHG | OXYGEN SATURATION: 99 % | WEIGHT: 233 LBS | BODY MASS INDEX: 39.78 KG/M2

## 2022-11-08 DIAGNOSIS — E66.09 CLASS 2 OBESITY DUE TO EXCESS CALORIES WITHOUT SERIOUS COMORBIDITY WITH BODY MASS INDEX (BMI) OF 39.0 TO 39.9 IN ADULT: Primary | ICD-10-CM

## 2022-11-08 DIAGNOSIS — Z12.31 ENCOUNTER FOR SCREENING MAMMOGRAM FOR MALIGNANT NEOPLASM OF BREAST: ICD-10-CM

## 2022-11-08 PROCEDURE — 99213 OFFICE O/P EST LOW 20 MIN: CPT | Performed by: NURSE PRACTITIONER
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
## 2022-11-22 ENCOUNTER — HOSPITAL ENCOUNTER (OUTPATIENT)
Dept: WOMENS IMAGING | Age: 47
Discharge: HOME | End: 2022-11-24
Payer: COMMERCIAL

## 2022-11-22 VITALS — BODY MASS INDEX: 39.99 KG/M2 | HEIGHT: 64 IN

## 2022-11-22 DIAGNOSIS — Z12.31 ENCOUNTER FOR SCREENING MAMMOGRAM FOR MALIGNANT NEOPLASM OF BREAST: ICD-10-CM

## 2022-11-22 PROCEDURE — 77067 SCR MAMMO BI INCL CAD: CPT

## 2022-12-07 ENCOUNTER — OFFICE VISIT (OUTPATIENT)
Dept: FAMILY MEDICINE CLINIC | Age: 47
End: 2022-12-07
Payer: COMMERCIAL

## 2022-12-07 VITALS
WEIGHT: 227 LBS | OXYGEN SATURATION: 99 % | HEIGHT: 64 IN | SYSTOLIC BLOOD PRESSURE: 104 MMHG | BODY MASS INDEX: 38.76 KG/M2 | HEART RATE: 82 BPM | DIASTOLIC BLOOD PRESSURE: 72 MMHG

## 2022-12-07 DIAGNOSIS — E28.2 POLYCYSTIC OVARY SYNDROME: ICD-10-CM

## 2022-12-07 DIAGNOSIS — E66.01 CLASS 3 SEVERE OBESITY DUE TO EXCESS CALORIES WITH SERIOUS COMORBIDITY AND BODY MASS INDEX (BMI) OF 40.0 TO 44.9 IN ADULT (HCC): ICD-10-CM

## 2022-12-07 PROCEDURE — 99213 OFFICE O/P EST LOW 20 MIN: CPT | Performed by: NURSE PRACTITIONER

## 2022-12-07 RX ORDER — SEMAGLUTIDE 1.7 MG/.75ML
1.7 INJECTION, SOLUTION SUBCUTANEOUS
Qty: 3 ML | Refills: 1 | Status: SHIPPED | OUTPATIENT
Start: 2022-12-07

## 2022-12-07 ASSESSMENT — ENCOUNTER SYMPTOMS
DIARRHEA: 0
COUGH: 0
CONSTIPATION: 0
NAUSEA: 1
SHORTNESS OF BREATH: 0
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
## 2023-01-17 ENCOUNTER — OFFICE VISIT (OUTPATIENT)
Dept: FAMILY MEDICINE CLINIC | Age: 48
End: 2023-01-17
Payer: COMMERCIAL

## 2023-01-17 VITALS
HEIGHT: 64 IN | DIASTOLIC BLOOD PRESSURE: 88 MMHG | HEART RATE: 80 BPM | SYSTOLIC BLOOD PRESSURE: 132 MMHG | WEIGHT: 218 LBS | BODY MASS INDEX: 37.22 KG/M2 | OXYGEN SATURATION: 98 %

## 2023-01-17 DIAGNOSIS — E28.2 POLYCYSTIC OVARY SYNDROME: ICD-10-CM

## 2023-01-17 DIAGNOSIS — E66.09 CLASS 2 OBESITY DUE TO EXCESS CALORIES WITHOUT SERIOUS COMORBIDITY WITH BODY MASS INDEX (BMI) OF 37.0 TO 37.9 IN ADULT: Primary | ICD-10-CM

## 2023-01-17 PROCEDURE — 99213 OFFICE O/P EST LOW 20 MIN: CPT | Performed by: NURSE PRACTITIONER

## 2023-01-17 ASSESSMENT — PATIENT HEALTH QUESTIONNAIRE - PHQ9
SUM OF ALL RESPONSES TO PHQ QUESTIONS 1-9: 0
2. FEELING DOWN, DEPRESSED OR HOPELESS: 0
SUM OF ALL RESPONSES TO PHQ9 QUESTIONS 1 & 2: 0
SUM OF ALL RESPONSES TO PHQ QUESTIONS 1-9: 0
1. LITTLE INTEREST OR PLEASURE IN DOING THINGS: 0

## 2023-01-17 ASSESSMENT — ENCOUNTER SYMPTOMS
SHORTNESS OF BREATH: 0
ABDOMINAL PAIN: 0
COUGH: 0
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
## 2023-02-23 DIAGNOSIS — E28.2 POLYCYSTIC OVARY SYNDROME: ICD-10-CM

## 2023-02-23 DIAGNOSIS — E66.01 CLASS 3 SEVERE OBESITY DUE TO EXCESS CALORIES WITH SERIOUS COMORBIDITY AND BODY MASS INDEX (BMI) OF 40.0 TO 44.9 IN ADULT (HCC): ICD-10-CM

## 2023-02-23 RX ORDER — SEMAGLUTIDE 1.7 MG/.75ML
INJECTION, SOLUTION SUBCUTANEOUS
Qty: 4 ML | Refills: 0 | Status: SHIPPED | OUTPATIENT
Start: 2023-02-23

## 2023-03-01 ENCOUNTER — OFFICE VISIT (OUTPATIENT)
Dept: FAMILY MEDICINE CLINIC | Age: 48
End: 2023-03-01
Payer: COMMERCIAL

## 2023-03-01 VITALS
WEIGHT: 215 LBS | OXYGEN SATURATION: 96 % | DIASTOLIC BLOOD PRESSURE: 84 MMHG | HEIGHT: 64 IN | HEART RATE: 95 BPM | SYSTOLIC BLOOD PRESSURE: 122 MMHG | BODY MASS INDEX: 36.7 KG/M2

## 2023-03-01 DIAGNOSIS — E66.09 CLASS 2 OBESITY DUE TO EXCESS CALORIES WITHOUT SERIOUS COMORBIDITY WITH BODY MASS INDEX (BMI) OF 36.0 TO 36.9 IN ADULT: ICD-10-CM

## 2023-03-01 DIAGNOSIS — E28.2 POLYCYSTIC OVARY SYNDROME: Primary | ICD-10-CM

## 2023-03-01 PROCEDURE — 99213 OFFICE O/P EST LOW 20 MIN: CPT | Performed by: NURSE PRACTITIONER

## 2023-03-01 RX ORDER — SEMAGLUTIDE 2.4 MG/.75ML
2.4 INJECTION, SOLUTION SUBCUTANEOUS
Qty: 3 ML | Refills: 2 | Status: SHIPPED | OUTPATIENT
Start: 2023-03-01

## 2023-03-01 SDOH — ECONOMIC STABILITY: HOUSING INSECURITY
IN THE LAST 12 MONTHS, WAS THERE A TIME WHEN YOU DID NOT HAVE A STEADY PLACE TO SLEEP OR SLEPT IN A SHELTER (INCLUDING NOW)?: NO

## 2023-03-01 SDOH — ECONOMIC STABILITY: FOOD INSECURITY: WITHIN THE PAST 12 MONTHS, THE FOOD YOU BOUGHT JUST DIDN'T LAST AND YOU DIDN'T HAVE MONEY TO GET MORE.: NEVER TRUE

## 2023-03-01 SDOH — ECONOMIC STABILITY: FOOD INSECURITY: WITHIN THE PAST 12 MONTHS, YOU WORRIED THAT YOUR FOOD WOULD RUN OUT BEFORE YOU GOT MONEY TO BUY MORE.: NEVER TRUE

## 2023-03-01 SDOH — ECONOMIC STABILITY: INCOME INSECURITY: HOW HARD IS IT FOR YOU TO PAY FOR THE VERY BASICS LIKE FOOD, HOUSING, MEDICAL CARE, AND HEATING?: NOT HARD AT ALL

## 2023-03-01 ASSESSMENT — ENCOUNTER SYMPTOMS
COUGH: 0
SHORTNESS OF BREATH: 0
CONSTIPATION: 0
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
## 2023-05-11 ENCOUNTER — OFFICE VISIT (OUTPATIENT)
Dept: FAMILY MEDICINE CLINIC | Age: 48
End: 2023-05-11
Payer: COMMERCIAL

## 2023-05-11 VITALS
DIASTOLIC BLOOD PRESSURE: 82 MMHG | SYSTOLIC BLOOD PRESSURE: 124 MMHG | OXYGEN SATURATION: 97 % | HEART RATE: 88 BPM | WEIGHT: 210 LBS | BODY MASS INDEX: 35.85 KG/M2 | HEIGHT: 64 IN

## 2023-05-11 DIAGNOSIS — E66.09 CLASS 2 OBESITY DUE TO EXCESS CALORIES WITHOUT SERIOUS COMORBIDITY WITH BODY MASS INDEX (BMI) OF 36.0 TO 36.9 IN ADULT: ICD-10-CM

## 2023-05-11 PROCEDURE — 99213 OFFICE O/P EST LOW 20 MIN: CPT | Performed by: NURSE PRACTITIONER

## 2023-05-11 RX ORDER — SEMAGLUTIDE 2.4 MG/.75ML
2.4 INJECTION, SOLUTION SUBCUTANEOUS
Qty: 3 ML | Refills: 2 | Status: SHIPPED | OUTPATIENT
Start: 2023-05-11

## 2023-05-11 RX ORDER — CYCLOBENZAPRINE HCL 10 MG
10 TABLET ORAL DAILY
COMMUNITY
Start: 2023-05-03

## 2023-05-11 ASSESSMENT — ENCOUNTER SYMPTOMS
DIARRHEA: 0
CONSTIPATION: 0
COUGH: 0
SHORTNESS OF BREATH: 0
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
## 2023-08-05 DIAGNOSIS — E66.09 CLASS 2 OBESITY DUE TO EXCESS CALORIES WITHOUT SERIOUS COMORBIDITY WITH BODY MASS INDEX (BMI) OF 36.0 TO 36.9 IN ADULT: ICD-10-CM

## 2023-08-07 RX ORDER — SEMAGLUTIDE 2.4 MG/.75ML
INJECTION, SOLUTION SUBCUTANEOUS
Qty: 4 ML | Refills: 0 | Status: SHIPPED | OUTPATIENT
Start: 2023-08-07 | End: 2023-08-10 | Stop reason: SDUPTHER

## 2023-08-10 ENCOUNTER — OFFICE VISIT (OUTPATIENT)
Dept: FAMILY MEDICINE CLINIC | Age: 48
End: 2023-08-10
Payer: COMMERCIAL

## 2023-08-10 VITALS
OXYGEN SATURATION: 98 % | HEIGHT: 64 IN | BODY MASS INDEX: 35.85 KG/M2 | HEART RATE: 96 BPM | SYSTOLIC BLOOD PRESSURE: 110 MMHG | WEIGHT: 210 LBS | DIASTOLIC BLOOD PRESSURE: 84 MMHG

## 2023-08-10 DIAGNOSIS — M50.122 DISORDER OF INTERVERTEBRAL DISC AT C5-C6 LEVEL WITH RADICULOPATHY: Primary | ICD-10-CM

## 2023-08-10 DIAGNOSIS — E66.09 CLASS 2 OBESITY DUE TO EXCESS CALORIES WITHOUT SERIOUS COMORBIDITY WITH BODY MASS INDEX (BMI) OF 36.0 TO 36.9 IN ADULT: ICD-10-CM

## 2023-08-10 PROCEDURE — 99213 OFFICE O/P EST LOW 20 MIN: CPT | Performed by: NURSE PRACTITIONER

## 2023-08-10 RX ORDER — SEMAGLUTIDE 2.4 MG/.75ML
INJECTION, SOLUTION SUBCUTANEOUS
Qty: 4 ML | Refills: 2 | Status: SHIPPED | OUTPATIENT
Start: 2023-08-10

## 2023-08-10 ASSESSMENT — ENCOUNTER SYMPTOMS
COUGH: 0
SHORTNESS OF BREATH: 0
BACK PAIN: 1
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
## 2023-08-21 ENCOUNTER — HOSPITAL ENCOUNTER (OUTPATIENT)
Dept: GENERAL RADIOLOGY | Age: 48
Discharge: HOME | End: 2023-08-23
Payer: COMMERCIAL

## 2023-08-21 ENCOUNTER — HOSPITAL ENCOUNTER (OUTPATIENT)
Age: 48
Discharge: HOME | End: 2023-08-23
Payer: COMMERCIAL

## 2023-08-21 ENCOUNTER — OFFICE VISIT (OUTPATIENT)
Dept: PAIN MANAGEMENT | Age: 48
End: 2023-08-21
Payer: COMMERCIAL

## 2023-08-21 VITALS
WEIGHT: 210 LBS | TEMPERATURE: 97.9 F | DIASTOLIC BLOOD PRESSURE: 74 MMHG | BODY MASS INDEX: 35.85 KG/M2 | SYSTOLIC BLOOD PRESSURE: 116 MMHG | HEIGHT: 64 IN

## 2023-08-21 DIAGNOSIS — M47.812 CERVICAL SPONDYLOSIS: ICD-10-CM

## 2023-08-21 DIAGNOSIS — M41.9 SCOLIOSIS, UNSPECIFIED SCOLIOSIS TYPE, UNSPECIFIED SPINAL REGION: ICD-10-CM

## 2023-08-21 DIAGNOSIS — M54.12 CERVICAL RADICULITIS: Primary | ICD-10-CM

## 2023-08-21 DIAGNOSIS — M54.6 THORACIC SPINE PAIN: ICD-10-CM

## 2023-08-21 DIAGNOSIS — Z98.1 HX OF FUSION OF CERVICAL SPINE: ICD-10-CM

## 2023-08-21 DIAGNOSIS — M96.1 POSTLAMINECTOMY SYNDROME, LUMBAR REGION: ICD-10-CM

## 2023-08-21 DIAGNOSIS — M54.12 CERVICAL RADICULITIS: ICD-10-CM

## 2023-08-21 DIAGNOSIS — M47.816 LUMBAR SPONDYLOSIS: ICD-10-CM

## 2023-08-21 PROCEDURE — 99214 OFFICE O/P EST MOD 30 MIN: CPT | Performed by: NURSE PRACTITIONER

## 2023-08-21 PROCEDURE — 72052 X-RAY EXAM NECK SPINE 6/>VWS: CPT

## 2023-08-21 PROCEDURE — 72070 X-RAY EXAM THORAC SPINE 2VWS: CPT

## 2023-08-21 RX ORDER — CYCLOBENZAPRINE HCL 10 MG
10 TABLET ORAL NIGHTLY PRN
Qty: 30 TABLET | Refills: 0 | Status: SHIPPED | OUTPATIENT
Start: 2023-08-21 | End: 2023-09-20

## 2023-08-21 RX ORDER — METHYLPREDNISOLONE 4 MG/1
TABLET ORAL
Qty: 1 KIT | Refills: 0 | Status: SHIPPED | OUTPATIENT
Start: 2023-08-21 | End: 2023-08-27

## 2023-08-21 ASSESSMENT — ENCOUNTER SYMPTOMS
COUGH: 0
GASTROINTESTINAL NEGATIVE: 1
CONSTIPATION: 0
BACK PAIN: 1
SHORTNESS OF BREATH: 0
DIARRHEA: 0
EYES NEGATIVE: 1
TROUBLE SWALLOWING: 0

## 2023-08-21 NOTE — PROGRESS NOTES
Vonnie Hernandez  (1975)    8/21/2023    Subjective:     Vonnie Hernandez is 51 y.o. female who complains today of:    Chief Complaint   Patient presents with    Follow-up    Back Pain    Neck Pain         Allergies:  Adhesive tape, Macrobid [nitrofurantoin monohydrate macrocrystals], and Sulfa antibiotics    Past Medical History:   Diagnosis Date    Anxiety and depression     Arthritis     Asthma     childhood only    Chronic back pain     Depression     Gastrointestinal problem     Irritable bowel syndrome     Mental disorder     PCOS (polycystic ovarian syndrome)     Urinary incontinence     stress     Past Surgical History:   Procedure Laterality Date    BACK SURGERY  2001,2005,2006    lumbar microdiscectomy x 3 -- last 2006    BACK SURGERY  2016    cervical fusion     BLADDER SUSPENSION  2013    BREAST BIOPSY Bilateral 01/16/2013    U/S Guided core bx of 2 solid nodules int he right and left breast    CERVICAL FUSION N/A 10/28/2016    ACDF ANTERIOR CERVICAL DISKECTOMY FUSION C5-6 USING CC TRIAD HELIX MINI-PLATE, 1.5 Randal Gross REP  performed by Devonte Larios MD at [address] [facility] 2007    CHOLECYSTECTOMY  2005    COLONOSCOPY      ELBOW SURGERY      pins in and out, pt broke tip of elbow    ENDOMETRIAL ABLATION  2014    ENDOSCOPY, COLON, DIAGNOSTIC      FRACTURE SURGERY  1985    fx / left elbow / pins in & then removed      [address]  12/2018    Repair recurrent VH with mesh    HYSTERECTOMY, TOTAL ABDOMINAL (CERVIX REMOVED)  04/07/2016    DR RAYA    NERVE SURGERY N/A 04/27/2021    PERMANENT DORSAL COLUMN STIMULATOR PLACEMENT performed by Bernie Polanco MD at [address]  2001, 2005, 2006    lumbar    STIMULATOR SURGERY N/A 03/11/2021    DORSAL COLUMN STIMULATOR TRIAL performed by Bernie Polanco MD at [facility]      at age 39    [address]  10/20/2015    [facility] N/A 12/13/2018

## 2023-08-23 ENCOUNTER — TELEPHONE (OUTPATIENT)
Dept: PAIN MANAGEMENT | Age: 48
End: 2023-08-23

## 2023-08-23 NOTE — TELEPHONE ENCOUNTER
Patient is aware of results. She said she is doing well on the steroids. No bad side effects. Can not yet tell if they are helping with the pain but hopes to see in a few days. She is scheduled with an EMG with Dr. Lev Baker on 9/12/2023.

## 2023-08-23 NOTE — TELEPHONE ENCOUNTER
----- Message from [address], APRN - CNP sent at 8/22/2023  4:01 PM EDT -----  X-ray of neck reviewed showing normal postsurgical.  At C5-6 fusion with advanced disc space narrowing at C6-7 noted. No listhesis. X-ray report of thoracic spine shows multilevel degenerative changes without fracture and neurostimulator tip projects into the mid thoracic spine. Did discuss care with Dr. Marleny Lares who said MBB's/RF ablation is a possibility even with SCS wires. MA, please tell patient x-ray report of neck shows arthritis below the fusion, fusion intact and mild degenerative/arthritis changes noted in her mid thoracic spine. May consider injections. How is she doing with steroids? Please encourage her to get EMG appointment with Dr. Bautista.

## 2023-09-12 ENCOUNTER — PROCEDURE VISIT (OUTPATIENT)
Dept: PAIN MANAGEMENT | Age: 48
End: 2023-09-12
Payer: COMMERCIAL

## 2023-09-12 DIAGNOSIS — R20.0 NUMBNESS OF LEFT HAND: Primary | ICD-10-CM

## 2023-09-12 PROCEDURE — 95912 NRV CNDJ TEST 11-12 STUDIES: CPT | Performed by: PHYSICAL MEDICINE & REHABILITATION

## 2023-09-12 PROCEDURE — 95886 MUSC TEST DONE W/N TEST COMP: CPT | Performed by: PHYSICAL MEDICINE & REHABILITATION

## 2023-09-12 NOTE — PROGRESS NOTES
Electromyography (EMG)/Nerve conduction studies (NCS) Report: Upper Extremities    Name: Nikki Hair   : 1975  Date: 2023   Physician: Devang Wiley MD        INDICATIONS: Nikki Hair is a 51 y.o. female who presents for electrodiagnostic evaluation for cervical radiculitis by request of Carlos Lira CNP working with Dr. Luisa Membreno. Her main symptom for evaluation is left hand numbness. She is right-handed. She confirms a history of cervical spine and left elbow surgery. Both limbs are necessary to examine in order to evaluate for any evidence of systemic disease as well as establish normal baseline values from which to compare any abnormal unilateral findings. The study is explained and verbal consent to proceed is obtained. NERVE CONDUCTION STUDIES:  Sensory nerve conduction studies: Bilateral median sensory nerve conduction studies to the second digit demonstrate normal peak latencies and amplitudes. Bilateral ulnar sensory nerve conduction studies to the fifth digit demonstrate normal peak latencies and amplitudes. Bilateral radial sensory nerve conduction studies to the base of the thumb demonstrate normal peak latencies and amplitudes. Bilateral upper limb temperatures are normal.     Motor nerve conduction studies: Bilateral median motor nerve conduction studies with pickup over the abductor pollicis brevis demonstrate normal distal latencies and amplitudes. Bilateral ulnar motor nerve conduction studies with pickup over the abductor digiti minimi demonstrate normal distal latencies and amplitudes. Conduction velocities in the left forearm and across the left elbow are normal. Given the clinical suspicion for ulnar neuropathy at the elbow and prior left elbow surgery, an additional motor study is performed.  Left ulnar motor nerve conduction study with pickup over the first dorsal interosseous demonstrates normal distal latency, normal amplitudes, and normal conduction

## 2023-09-19 ENCOUNTER — OFFICE VISIT (OUTPATIENT)
Dept: PAIN MANAGEMENT | Age: 48
End: 2023-09-19
Payer: COMMERCIAL

## 2023-09-19 VITALS
HEART RATE: 75 BPM | SYSTOLIC BLOOD PRESSURE: 120 MMHG | HEIGHT: 64 IN | BODY MASS INDEX: 35.85 KG/M2 | OXYGEN SATURATION: 98 % | WEIGHT: 210 LBS | DIASTOLIC BLOOD PRESSURE: 78 MMHG | TEMPERATURE: 96.9 F

## 2023-09-19 DIAGNOSIS — Z98.1 HX OF FUSION OF CERVICAL SPINE: ICD-10-CM

## 2023-09-19 DIAGNOSIS — R20.0 NUMBNESS OF LEFT HAND: ICD-10-CM

## 2023-09-19 DIAGNOSIS — M96.1 POSTLAMINECTOMY SYNDROME, LUMBAR REGION: ICD-10-CM

## 2023-09-19 DIAGNOSIS — M54.6 THORACIC SPINE PAIN: ICD-10-CM

## 2023-09-19 DIAGNOSIS — M47.816 LUMBAR SPONDYLOSIS: ICD-10-CM

## 2023-09-19 DIAGNOSIS — M41.9 SCOLIOSIS, UNSPECIFIED SCOLIOSIS TYPE, UNSPECIFIED SPINAL REGION: ICD-10-CM

## 2023-09-19 DIAGNOSIS — M47.812 CERVICAL SPONDYLOSIS: Primary | ICD-10-CM

## 2023-09-19 PROCEDURE — 99214 OFFICE O/P EST MOD 30 MIN: CPT | Performed by: NURSE PRACTITIONER

## 2023-09-19 RX ORDER — CYCLOBENZAPRINE HCL 10 MG
10 TABLET ORAL NIGHTLY PRN
Qty: 30 TABLET | Refills: 0 | Status: SHIPPED | OUTPATIENT
Start: 2023-09-19 | End: 2023-10-19

## 2023-09-19 ASSESSMENT — ENCOUNTER SYMPTOMS
EYES NEGATIVE: 1
CONSTIPATION: 0
SHORTNESS OF BREATH: 0
GASTROINTESTINAL NEGATIVE: 1
TROUBLE SWALLOWING: 0
DIARRHEA: 0
COUGH: 0
BACK PAIN: 1

## 2023-09-19 NOTE — PROGRESS NOTES
Minnie Vega  (1975)    9/19/2023    Subjective:     Minnie Vega is 51 y.o. female who complains today of:    Chief Complaint   Patient presents with    1 Month Follow-Up         Allergies:  Adhesive tape, Macrobid [nitrofurantoin monohydrate macrocrystals], and Sulfa antibiotics    Past Medical History:   Diagnosis Date    Anxiety and depression     Arthritis     Asthma     childhood only    Chronic back pain     Depression     Gastrointestinal problem     Irritable bowel syndrome     Mental disorder     PCOS (polycystic ovarian syndrome)     Urinary incontinence     stress     Past Surgical History:   Procedure Laterality Date    BACK SURGERY  2001,2005,2006    lumbar microdiscectomy x 3 -- last 2006    BACK SURGERY  2016    cervical fusion     BLADDER SUSPENSION  2013    BREAST BIOPSY Bilateral 01/16/2013    U/S Guided core bx of 2 solid nodules int he right and left breast    CERVICAL FUSION N/A 10/28/2016    ACDF ANTERIOR CERVICAL DISKECTOMY FUSION C5-6 USING CC TRIAD HELIX MINI-PLATE, 1.5 HRS, Debbie Harsh REP  performed by Idania Love MD at [address] [facility] 2007    CHOLECYSTECTOMY  2005    COLONOSCOPY      ELBOW SURGERY      pins in and out, pt broke tip of elbow    ENDOMETRIAL ABLATION  2014    ENDOSCOPY, COLON, DIAGNOSTIC      FRACTURE SURGERY  1985    fx / left elbow / pins in & then removed      [address]  12/2018    Repair recurrent VH with mesh    HYSTERECTOMY, TOTAL ABDOMINAL (CERVIX REMOVED)  04/07/2016    DR RAYA    NERVE SURGERY N/A 04/27/2021    PERMANENT DORSAL COLUMN STIMULATOR PLACEMENT performed by Neftali Ho MD at [address]  2001, 2005, 2006    lumbar    STIMULATOR SURGERY N/A 03/11/2021    DORSAL COLUMN STIMULATOR TRIAL performed by Neftali Ho MD at [address]      at age 39    [address]  10/20/2015    [address] N/A 12/13/2018    With mesh     Family

## 2023-09-21 ENCOUNTER — TELEPHONE (OUTPATIENT)
Dept: PAIN MANAGEMENT | Age: 48
End: 2023-09-21

## 2023-09-21 NOTE — TELEPHONE ENCOUNTER
BILAT C6,7,T1 MBB    NO AUTH REQUIRED    OK to schedule procedure approved as above. Please note sides/levels approved and date range.    (If applicable, sides/levels approved may differ from those ordered)    TO [phone]

## 2023-09-25 DIAGNOSIS — F40.240 CLAUSTROPHOBIA: Primary | ICD-10-CM

## 2023-09-25 RX ORDER — DIAZEPAM 5 MG/1
TABLET ORAL
Qty: 2 TABLET | Refills: 1 | Status: SHIPPED | OUTPATIENT
Start: 2023-09-25 | End: 2023-10-04

## 2023-10-02 ENCOUNTER — PROCEDURE VISIT (OUTPATIENT)
Dept: PAIN MANAGEMENT | Age: 48
End: 2023-10-02

## 2023-10-02 DIAGNOSIS — M47.812 CERVICAL SPONDYLOSIS: ICD-10-CM

## 2023-10-02 RX ORDER — LIDOCAINE HYDROCHLORIDE 10 MG/ML
3 INJECTION, SOLUTION EPIDURAL; INFILTRATION; INTRACAUDAL; PERINEURAL ONCE
Status: COMPLETED | OUTPATIENT
Start: 2023-10-02 | End: 2023-10-02

## 2023-10-02 RX ORDER — DEXAMETHASONE SODIUM PHOSPHATE 10 MG/ML
10 INJECTION, SOLUTION INTRAMUSCULAR; INTRAVENOUS ONCE
Status: COMPLETED | OUTPATIENT
Start: 2023-10-02 | End: 2023-10-02

## 2023-10-02 RX ADMIN — DEXAMETHASONE SODIUM PHOSPHATE 10 MG: 10 INJECTION, SOLUTION INTRAMUSCULAR; INTRAVENOUS at 09:51

## 2023-10-02 RX ADMIN — LIDOCAINE HYDROCHLORIDE 3 ML: 10 INJECTION, SOLUTION EPIDURAL; INFILTRATION; INTRACAUDAL; PERINEURAL at 09:50

## 2023-10-02 NOTE — PROGRESS NOTES
[facility] ([facility]) Physicians  Neurosurgery and Pain [facility]  [address] [place], [address] [address] [facility], [address] [place] [address] [place]: [phone]  F: [phone]        [address] [place] [address] Road BLOCK    Provider: Patricia Ralph DO          Patient Name: Keisha Gómez : 1975        Date: 10/2/2023         Keisha Gómez is here today for interventional pain management. Sterile technique was used. Fluoroscopic guidance was used. Patient positioned in prone position. Area was cleaned with Betadine. Informed consent was obtained. Appropriate length spinal needle was advanced to the anatomic location of the medial branch at the lateral mass utilizing intermittent fluoroscopy. Approximately 5mg Dexamethasone was divided equally at each level and 0.5cc of 1% PF Lidocaine was injected at each anatomic level without difficulty. Patient tolerated the procedure well, no obvious complications occurred during the procedure. Patient was appropriately monitored and discharged home in stable condition with their usual motor strength. The patient will keep close track of pain over the next several hours and call our office tomorrow and let us know what percentage of pain relief is experienced. Post op Instructions were given to patient. If on blood thiners patient was told to resume them post-procedure. Relevant and recent imaging reviewed with patient today.          B T1 also done    [x] Bilateral [] C2 [] C3      [] C4 [] C5     [] Right [x] C6 [x] C7            [] Left                                            Patricia Ralph DO

## 2023-10-03 ENCOUNTER — HOSPITAL ENCOUNTER (OUTPATIENT)
Dept: MRI IMAGING | Age: 48
Discharge: HOME | End: 2023-10-05
Payer: COMMERCIAL

## 2023-10-03 DIAGNOSIS — M47.812 CERVICAL SPONDYLOSIS: ICD-10-CM

## 2023-10-03 DIAGNOSIS — R20.0 NUMBNESS OF LEFT HAND: ICD-10-CM

## 2023-10-03 PROCEDURE — 72141 MRI NECK SPINE W/O DYE: CPT

## 2023-10-17 ENCOUNTER — OFFICE VISIT (OUTPATIENT)
Dept: PAIN MANAGEMENT | Age: 48
End: 2023-10-17
Payer: COMMERCIAL

## 2023-10-17 VITALS
HEIGHT: 64 IN | WEIGHT: 210 LBS | TEMPERATURE: 97.8 F | BODY MASS INDEX: 35.85 KG/M2 | SYSTOLIC BLOOD PRESSURE: 124 MMHG | DIASTOLIC BLOOD PRESSURE: 72 MMHG

## 2023-10-17 DIAGNOSIS — M96.1 POSTLAMINECTOMY SYNDROME, LUMBAR REGION: ICD-10-CM

## 2023-10-17 DIAGNOSIS — M47.816 LUMBAR SPONDYLOSIS: ICD-10-CM

## 2023-10-17 DIAGNOSIS — M47.812 CERVICAL SPONDYLOSIS: Primary | ICD-10-CM

## 2023-10-17 PROCEDURE — 99214 OFFICE O/P EST MOD 30 MIN: CPT | Performed by: NURSE PRACTITIONER

## 2023-10-17 RX ORDER — CYCLOBENZAPRINE HCL 10 MG
10 TABLET ORAL NIGHTLY PRN
Qty: 30 TABLET | Refills: 2 | Status: SHIPPED | OUTPATIENT
Start: 2023-10-17 | End: 2024-01-15

## 2023-10-17 ASSESSMENT — ENCOUNTER SYMPTOMS
DIARRHEA: 0
CONSTIPATION: 0
TROUBLE SWALLOWING: 0
GASTROINTESTINAL NEGATIVE: 1
SHORTNESS OF BREATH: 0
COUGH: 0
EYES NEGATIVE: 1

## 2023-10-17 NOTE — PROGRESS NOTES
mid thoracic spine bilaterally along bra line. Negative Spurling's maneuver. Strong grasp B/L. Strength is functional in UE bilaterally. Pulses are intact. Scar noted. Decreased ROM with flexion and extension of low back. NonTTP to low back\. SLR on Lt increases low back pain. Tightness in both hamstrings noted. Pt is able to briefly heel walk and toe walk with pain on Lt - Weakness with ADF on Lt noted without change. Surgical scars noted. Balance and coordination normal.  Strength is functional for ambulation. Cranial nerves II-XII are intact. No change from previous exam.      Assessment:      Diagnosis Orders   1. Cervical spondylosis  diclofenac (VOLTAREN) 50 MG EC tablet    CHG FLUOR NEEDLE/CATH SPINE/PARASPINAL DX/THER ADDON    CT NJX DX/THER AGT PVRT FACET JT CRV/THRC 1 LEVEL    CT NJX DX/THER AGT PVRT FACET JT CRV/THRC 2ND LEVEL      2. Lumbar spondylosis  diclofenac (VOLTAREN) 50 MG EC tablet      3. Postlaminectomy syndrome, lumbar region  diclofenac (VOLTAREN) 50 MG EC tablet          Plan:          Orders Placed This Encounter   Medications    cyclobenzaprine (FLEXERIL) 10 MG tablet     Sig: Take 1 tablet by mouth nightly as needed for Muscle spasms     Dispense:  30 tablet     Refill:  2    diclofenac (VOLTAREN) 50 MG EC tablet     Sig: Take 1 tablet by mouth 2 times daily     Dispense:  60 tablet     Refill:  2       Orders Placed This Encounter   Procedures    CHG FLUOR NEEDLE/CATH SPINE/PARASPINAL DX/THER ADDON     Standing Status:   Future     Standing Expiration Date:   1/15/2024    CT NJX DX/THER AGT PVRT FACET JT CRV/THRC 1 LEVEL     B/L C6,7,T1 MBB with SK - 2nd set     Standing Status:   Future     Standing Expiration Date:   1/15/2024    CT NJX DX/THER AGT PVRT FACET JT CRV/THRC 2ND LEVEL     Standing Status:   Future     Standing Expiration Date:   1/15/2024     Discussed options with the patient today. Anatomic model pathology was shown and reviewed with pt.  Patient had

## 2023-10-18 ENCOUNTER — TELEPHONE (OUTPATIENT)
Dept: PAIN MANAGEMENT | Age: 48
End: 2023-10-18

## 2023-10-18 NOTE — TELEPHONE ENCOUNTER
SECOND BILAT C6,7,T1 MBB    NO AUTH REQUIRED    OK to schedule procedure approved as above. Please note sides/levels approved and date range.    (If applicable, sides/levels approved may differ from those ordered)        TO [phone]

## 2023-10-19 ENCOUNTER — PROCEDURE VISIT (OUTPATIENT)
Dept: PAIN MANAGEMENT | Age: 48
End: 2023-10-19
Payer: COMMERCIAL

## 2023-10-19 DIAGNOSIS — M47.812 CERVICAL SPONDYLOSIS: ICD-10-CM

## 2023-10-19 PROCEDURE — 64491 INJ PARAVERT F JNT C/T 2 LEV: CPT | Performed by: PAIN MEDICINE

## 2023-10-19 PROCEDURE — 64490 INJ PARAVERT F JNT C/T 1 LEV: CPT | Performed by: PAIN MEDICINE

## 2023-10-19 RX ORDER — LIDOCAINE HYDROCHLORIDE 10 MG/ML
3 INJECTION, SOLUTION EPIDURAL; INFILTRATION; INTRACAUDAL; PERINEURAL ONCE
Status: COMPLETED | OUTPATIENT
Start: 2023-10-19 | End: 2023-10-19

## 2023-10-19 RX ADMIN — LIDOCAINE HYDROCHLORIDE 3 ML: 10 INJECTION, SOLUTION EPIDURAL; INFILTRATION; INTRACAUDAL; PERINEURAL at 10:19

## 2023-10-19 NOTE — PROGRESS NOTES
[facility] ([facility]) Physicians  Neurosurgery and Pain [facility]  [address] [place], [address] [address] [facility], [address] [place], [place] [address]vard: [phone]  F: [phone]        [address] [place] [address] Road BLOCK    Provider: Merlinda Gibney, DO          Patient Name: Minnie Vega : 1975        Date: 10/19/2023         Minnie Vega is here today for interventional pain management. Sterile technique was used. Fluoroscopic guidance was used. Patient positioned in prone position. Area was cleaned with Betadine. Informed consent was obtained. Appropriate length spinal needle was advanced to the anatomic location of the medial branch at the lateral mass utilizing intermittent fluoroscopy. Approximately  0.5cc of 1% PF Lidocaine was injected at each anatomic level without difficulty. Patient tolerated the procedure well, no obvious complications occurred during the procedure. Patient was appropriately monitored and discharged home in stable condition with their usual motor strength. The patient will keep close track of pain over the next several hours and call our office tomorrow and let us know what percentage of pain relief is experienced. Post op Instructions were given to patient. If on blood thiners patient was told to resume them post-procedure. Relevant and recent imaging reviewed with patient today. Bilateral T1 also performed    [x] Bilateral [] C2 [] C3      [] C4 [] C5     [] Right [x] C6 [x] C7            [] Left                                      Patient had >80% pain relief following procedure with positive facet joint provocativemaneuvers, schedule RF ablation.       Merlinda Gibney, DO

## 2023-10-25 ENCOUNTER — OFFICE VISIT (OUTPATIENT)
Dept: FAMILY MEDICINE CLINIC | Age: 48
End: 2023-10-25
Payer: COMMERCIAL

## 2023-10-25 VITALS
HEIGHT: 64 IN | BODY MASS INDEX: 34.83 KG/M2 | OXYGEN SATURATION: 99 % | DIASTOLIC BLOOD PRESSURE: 80 MMHG | HEART RATE: 90 BPM | SYSTOLIC BLOOD PRESSURE: 120 MMHG | WEIGHT: 204 LBS

## 2023-10-25 DIAGNOSIS — E28.2 POLYCYSTIC OVARY SYNDROME: Primary | ICD-10-CM

## 2023-10-25 PROCEDURE — 99213 OFFICE O/P EST LOW 20 MIN: CPT | Performed by: NURSE PRACTITIONER

## 2023-10-25 RX ORDER — SEMAGLUTIDE 2.4 MG/.75ML
INJECTION, SOLUTION SUBCUTANEOUS
Qty: 4 ML | Refills: 2 | Status: SHIPPED | OUTPATIENT
Start: 2023-10-25

## 2023-10-25 ASSESSMENT — ENCOUNTER SYMPTOMS
COUGH: 0
SHORTNESS OF BREATH: 0
CONSTIPATION: 0
DIARRHEA: 0

## 2023-10-25 NOTE — PROGRESS NOTES
No Food Insecurity (3/1/2023)    Hunger Vital Sign     Worried About Running Out of Food in the Last Year: Never true     Ran Out of Food in the Last Year: Never true   Transportation Needs: Unknown (3/1/2023)    PRAPARE - Transportation     Lack of Transportation (Non-Medical): No   Housing Stability: Unknown (3/1/2023)    Housing Stability Vital Sign     Unstable Housing in the Last Year: No     Current Outpatient Medications on File Prior to Visit   Medication Sig Dispense Refill    cyclobenzaprine (FLEXERIL) 10 MG tablet Take 1 tablet by mouth nightly as needed for Muscle spasms 30 tablet 2    diclofenac (VOLTAREN) 50 MG EC tablet Take 1 tablet by mouth 2 times daily 60 tablet 2    Biotin 1000 MCG TABS Take by mouth      fluticasone (FLONASE) 50 MCG/ACT nasal spray 1 spray by Each Nostril route daily 1 Bottle 1    acetaminophen (TYLENOL) 325 MG tablet Take 2 tablets by mouth every 6 hours as needed for Pain      cetirizine (ZYRTEC) 10 MG tablet Take 1 tablet by mouth daily as needed       No current facility-administered medications on file prior to visit. Allergies   Allergen Reactions    Adhesive Tape Rash    Macrobid [Nitrofurantoin Monohydrate Macrocrystals] Rash    Sulfa Antibiotics Rash       Review of Systems   Constitutional:  Negative for fatigue. Respiratory:  Negative for cough and shortness of breath. Cardiovascular:  Negative for chest pain. Gastrointestinal:  Negative for constipation and diarrhea. Objective  Vitals:    10/25/23 0809   BP: 120/80   Site: Left Upper Arm   Position: Sitting   Cuff Size: Medium Adult   Pulse: 90   SpO2: 99%   Weight: 92.5 kg (204 lb)   Height: 1.626 m (5' 4\")     Physical Exam  Vitals and nursing note reviewed. Constitutional:       Appearance: Normal appearance. HENT:      Head: Normocephalic. Nose: Nose normal.      Mouth/Throat:      Mouth: Mucous membranes are moist.      Pharynx: Oropharynx is clear.    Eyes:      Extraocular Movements:

## 2023-10-30 ENCOUNTER — OFFICE VISIT (OUTPATIENT)
Dept: PAIN MANAGEMENT | Age: 48
End: 2023-10-30
Payer: COMMERCIAL

## 2023-10-30 DIAGNOSIS — M47.812 CERVICAL SPONDYLOSIS: ICD-10-CM

## 2023-10-30 PROCEDURE — 64634 DESTROY C/TH FACET JNT ADDL: CPT | Performed by: PAIN MEDICINE

## 2023-10-30 PROCEDURE — 64633 DESTROY CERV/THOR FACET JNT: CPT | Performed by: PAIN MEDICINE

## 2023-10-30 RX ORDER — LIDOCAINE HYDROCHLORIDE 10 MG/ML
3 INJECTION, SOLUTION EPIDURAL; INFILTRATION; INTRACAUDAL; PERINEURAL ONCE
Status: COMPLETED | OUTPATIENT
Start: 2023-10-30 | End: 2023-10-30

## 2023-10-30 RX ORDER — DEXAMETHASONE SODIUM PHOSPHATE 10 MG/ML
10 INJECTION, SOLUTION INTRAMUSCULAR; INTRAVENOUS ONCE
Status: COMPLETED | OUTPATIENT
Start: 2023-10-30 | End: 2023-10-30

## 2023-10-30 RX ADMIN — DEXAMETHASONE SODIUM PHOSPHATE 10 MG: 10 INJECTION, SOLUTION INTRAMUSCULAR; INTRAVENOUS at 08:47

## 2023-10-30 RX ADMIN — LIDOCAINE HYDROCHLORIDE 3 ML: 10 INJECTION, SOLUTION EPIDURAL; INFILTRATION; INTRACAUDAL; PERINEURAL at 08:46

## 2023-10-30 NOTE — PROGRESS NOTES
Procedure: B C6,C7,T1 cervical radiofrequency medial branch ablation using fluoroscopic needle guidance. Timeout taken to identify correct patient, procedure and side. Patient lying in the prone position the patient was prepped and draped in the usual sterile fashion using Betadine. The levels were determined under fluoroscopy. 1% preservative-free lidocaine was used to numb the skin using a 27-gauge 1-1/2 inch needle. Radiofrequency needle was introduced to the anatomic location of the medial branch at the lateral masses utilizing intermittent fluoroscopy. Motor stimulation up to 2 V was done to confirm no ablation of the ventral ramus at each level. Prior to lesioning at [address] [place] [address] for 90 seconds 1.5 mL of 1% preservative-free lidocaine along with 10 mg dexamethasone preservative-free was divided equally amongst the levels and injected with negative aspiration. This was done at each level. The procedure was tolerated well without complications. The patient was monitored after procedure, had their usual motor strength. And discharged home in stable condition. Patient will ice the area and  take it easy. All questions answered, chart was reviewed.

## 2023-11-14 ENCOUNTER — OFFICE VISIT (OUTPATIENT)
Dept: FAMILY MEDICINE CLINIC | Age: 48
End: 2023-11-14
Payer: COMMERCIAL

## 2023-11-14 VITALS
BODY MASS INDEX: 35 KG/M2 | SYSTOLIC BLOOD PRESSURE: 110 MMHG | DIASTOLIC BLOOD PRESSURE: 72 MMHG | WEIGHT: 205 LBS | HEIGHT: 64 IN | HEART RATE: 92 BPM | OXYGEN SATURATION: 98 %

## 2023-11-14 DIAGNOSIS — R53.83 OTHER FATIGUE: ICD-10-CM

## 2023-11-14 DIAGNOSIS — Z12.31 ENCOUNTER FOR SCREENING MAMMOGRAM FOR MALIGNANT NEOPLASM OF BREAST: ICD-10-CM

## 2023-11-14 DIAGNOSIS — Z13.220 LIPID SCREENING: ICD-10-CM

## 2023-11-14 DIAGNOSIS — Z00.00 WELL ADULT EXAM: Primary | ICD-10-CM

## 2023-11-14 LAB
ALBUMIN SERPL-MCNC: 4.4 G/DL (ref 3.5–4.6)
ALP SERPL-CCNC: 53 U/L (ref 40–130)
ALT SERPL-CCNC: 33 U/L (ref 0–33)
ANION GAP SERPL CALCULATED.3IONS-SCNC: 11 MEQ/L (ref 9–15)
AST SERPL-CCNC: 22 U/L (ref 0–35)
BILIRUB SERPL-MCNC: 0.3 MG/DL (ref 0.2–0.7)
BUN SERPL-MCNC: 11 MG/DL (ref 6–20)
CALCIUM SERPL-MCNC: 9.5 MG/DL (ref 8.5–9.9)
CHLORIDE SERPL-SCNC: 104 MEQ/L (ref 95–107)
CHOLEST SERPL-MCNC: 170 MG/DL (ref 0–199)
CO2 SERPL-SCNC: 24 MEQ/L (ref 20–31)
CREAT SERPL-MCNC: 0.77 MG/DL (ref 0.5–0.9)
GLOBULIN SER CALC-MCNC: 2.7 G/DL (ref 2.3–3.5)
GLUCOSE SERPL-MCNC: 79 MG/DL (ref 70–99)
HDLC SERPL-MCNC: 57 MG/DL (ref 40–59)
LDLC SERPL CALC-MCNC: 100 MG/DL (ref 0–129)
POTASSIUM SERPL-SCNC: 5.1 MEQ/L (ref 3.4–4.9)
PROT SERPL-MCNC: 7.1 G/DL (ref 6.3–8)
SODIUM SERPL-SCNC: 139 MEQ/L (ref 135–144)
TRIGL SERPL-MCNC: 63 MG/DL (ref 0–150)

## 2023-11-14 PROCEDURE — 99396 PREV VISIT EST AGE 40-64: CPT | Performed by: NURSE PRACTITIONER

## 2023-11-14 RX ORDER — SEMAGLUTIDE 2.4 MG/.75ML
INJECTION, SOLUTION SUBCUTANEOUS
Qty: 4 ML | Refills: 2 | Status: SHIPPED | OUTPATIENT
Start: 2023-11-14

## 2023-11-14 ASSESSMENT — ENCOUNTER SYMPTOMS
COUGH: 0
CONSTIPATION: 0
DIARRHEA: 0
SHORTNESS OF BREATH: 0
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
## 2023-11-24 ENCOUNTER — HOSPITAL ENCOUNTER (OUTPATIENT)
Dept: WOMENS IMAGING | Age: 48
Discharge: HOME | End: 2023-11-24
Payer: COMMERCIAL

## 2023-11-24 VITALS — HEIGHT: 64 IN | BODY MASS INDEX: 35.17 KG/M2

## 2023-11-24 DIAGNOSIS — Z12.31 ENCOUNTER FOR SCREENING MAMMOGRAM FOR MALIGNANT NEOPLASM OF BREAST: ICD-10-CM

## 2023-11-24 PROCEDURE — 77063 BREAST TOMOSYNTHESIS BI: CPT

## 2023-11-28 ENCOUNTER — OFFICE VISIT (OUTPATIENT)
Dept: PAIN MANAGEMENT | Age: 48
End: 2023-11-28
Payer: COMMERCIAL

## 2023-11-28 VITALS
DIASTOLIC BLOOD PRESSURE: 78 MMHG | TEMPERATURE: 97.5 F | SYSTOLIC BLOOD PRESSURE: 118 MMHG | WEIGHT: 205 LBS | HEIGHT: 64 IN | BODY MASS INDEX: 35 KG/M2

## 2023-11-28 DIAGNOSIS — M47.812 CERVICAL SPONDYLOSIS: Primary | ICD-10-CM

## 2023-11-28 DIAGNOSIS — M47.816 LUMBAR SPONDYLOSIS: ICD-10-CM

## 2023-11-28 PROCEDURE — 99213 OFFICE O/P EST LOW 20 MIN: CPT | Performed by: NURSE PRACTITIONER

## 2023-11-28 ASSESSMENT — ENCOUNTER SYMPTOMS
EYES NEGATIVE: 1
BACK PAIN: 1
DIARRHEA: 0
CONSTIPATION: 0
SHORTNESS OF BREATH: 0
GASTROINTESTINAL NEGATIVE: 1
TROUBLE SWALLOWING: 0
COUGH: 0

## 2023-11-28 NOTE — PROGRESS NOTES
Dean Cease  (1975)    11/28/2023    Subjective:     Dean Cease is 51 y.o. female who complains today of:    Chief Complaint   Patient presents with    Follow-up         Allergies:  Adhesive tape, Macrobid [nitrofurantoin monohydrate macrocrystals], and Sulfa antibiotics    Past Medical History:   Diagnosis Date    Anxiety and depression     Arthritis     Asthma     childhood only    Chronic back pain     Depression     Gastrointestinal problem     Irritable bowel syndrome     Mental disorder     PCOS (polycystic ovarian syndrome)     Urinary incontinence     stress     Past Surgical History:   Procedure Laterality Date    BACK SURGERY  2001,2005,2006    lumbar microdiscectomy x 3 -- last 2006    BACK SURGERY  2016    cervical fusion     BLADDER SUSPENSION  2013    BREAST BIOPSY Bilateral 01/16/2013    U/S Guided core bx of 2 solid nodules in the right and left breast    CERVICAL FUSION N/A 10/28/2016    ACDF ANTERIOR CERVICAL DISKECTOMY FUSION C5-6 USING CC TRIAD HELIX MINI-PLATE, 1.5 Francisca Double REP  performed by Sid Vee MD at [address] [facility],[address] 2007    CHOLECYSTECTOMY  2005    COLONOSCOPY      ELBOW SURGERY      pins in and out, pt broke tip of elbow    ENDOMETRIAL ABLATION  2014    ENDOSCOPY, COLON, DIAGNOSTIC      FRACTURE SURGERY  1985    fx / left elbow / pins in & then removed      [address]  12/2018    Repair recurrent VH with mesh    HYSTERECTOMY, TOTAL ABDOMINAL (CERVIX REMOVED)  04/07/2016    DR RAYA (partial)    NERVE SURGERY N/A 04/27/2021    PERMANENT DORSAL COLUMN STIMULATOR PLACEMENT performed by Donn Mak MD at [address]  2001, 2005, 2006    lumbar    STIMULATOR SURGERY N/A 03/11/2021    DORSAL COLUMN STIMULATOR TRIAL performed by Donn Mak MD at [facility]      at age 39    [address]  10/20/2015    Jeannie Chou MD    [facility] N/A 12/13/2018    With mesh

## 2024-01-20 DIAGNOSIS — M96.1 POSTLAMINECTOMY SYNDROME, LUMBAR REGION: ICD-10-CM

## 2024-01-20 DIAGNOSIS — M47.812 CERVICAL SPONDYLOSIS: ICD-10-CM

## 2024-01-20 DIAGNOSIS — M47.816 LUMBAR SPONDYLOSIS: ICD-10-CM

## 2024-01-22 NOTE — TELEPHONE ENCOUNTER
Requested Prescriptions     Pending Prescriptions Disp Refills    diclofenac (VOLTAREN) 50 MG EC tablet [Pharmacy Med Name: Diclofenac Sodium 50 MG Oral Tablet Delayed Release] 60 tablet 0     Sig: Take 1 tablet by mouth twice daily       Patient last seen on:  11/28/23    Date of last refill:  10/17/23    Next office visit date: 1/25/2024    Adhesive tape, Macrobid [nitrofurantoin monohydrate macrocrystals], and Sulfa antibiotics

## 2024-01-25 ENCOUNTER — OFFICE VISIT (OUTPATIENT)
Dept: PAIN MANAGEMENT | Age: 49
End: 2024-01-25
Payer: COMMERCIAL

## 2024-01-25 VITALS
DIASTOLIC BLOOD PRESSURE: 74 MMHG | SYSTOLIC BLOOD PRESSURE: 122 MMHG | WEIGHT: 202 LBS | TEMPERATURE: 97.8 F | HEIGHT: 64 IN | BODY MASS INDEX: 34.49 KG/M2

## 2024-01-25 DIAGNOSIS — M96.1 POSTLAMINECTOMY SYNDROME, LUMBAR REGION: ICD-10-CM

## 2024-01-25 DIAGNOSIS — M47.812 CERVICAL SPONDYLOSIS: ICD-10-CM

## 2024-01-25 DIAGNOSIS — M47.816 LUMBAR SPONDYLOSIS: Primary | ICD-10-CM

## 2024-01-25 DIAGNOSIS — M62.838 MUSCLE SPASM: ICD-10-CM

## 2024-01-25 PROCEDURE — 99213 OFFICE O/P EST LOW 20 MIN: CPT | Performed by: NURSE PRACTITIONER

## 2024-01-25 RX ORDER — CYCLOBENZAPRINE HCL 10 MG
10 TABLET ORAL NIGHTLY PRN
Qty: 30 TABLET | Refills: 2 | Status: SHIPPED | OUTPATIENT
Start: 2024-01-25 | End: 2024-04-24

## 2024-01-25 ASSESSMENT — ENCOUNTER SYMPTOMS
TROUBLE SWALLOWING: 0
SHORTNESS OF BREATH: 0
GASTROINTESTINAL NEGATIVE: 1
COUGH: 0
CONSTIPATION: 0
BACK PAIN: 1
DIARRHEA: 0
EYES NEGATIVE: 1

## 2024-01-25 NOTE — PROGRESS NOTES
Sabine Rosenberg  (1975)    2024    Subjective:     Sabine Rosenberg is 48 y.o. female who complains today of:    Chief Complaint   Patient presents with    Neck Pain         Allergies:  Adhesive tape, Macrobid [nitrofurantoin monohydrate macrocrystals], and Sulfa antibiotics    Past Medical History:   Diagnosis Date    Anxiety and depression     Arthritis     Asthma     childhood only    Chronic back pain     Depression     Gastrointestinal problem     Irritable bowel syndrome     Mental disorder     PCOS (polycystic ovarian syndrome)     Urinary incontinence     stress     Past Surgical History:   Procedure Laterality Date    BACK SURGERY  ,,2006    lumbar microdiscectomy x 3 -- last 2006    BACK SURGERY  2016    cervical fusion     BLADDER SUSPENSION  2013    BREAST BIOPSY Bilateral 2013    U/S Guided core bx of 2 solid nodules in the right and left breast    CERVICAL FUSION N/A 10/28/2016    ACDF ANTERIOR CERVICAL DISKECTOMY FUSION C5-6 USING CC TRIAD HELIX MINI-PLATE, 1.5 HRS, NUVASIVE REP  performed by Dillon Eddy MD at [facility] [place]     SECTION   &     CHOLECYSTECTOMY      COLONOSCOPY      ELBOW SURGERY      pins in and out, pt broke tip of elbow    ENDOMETRIAL ABLATION      ENDOSCOPY, COLON, DIAGNOSTIC      FRACTURE SURGERY  1985    fx / left elbow / pins in & then removed    GALLBLADDER SURGERY      HERNIA REPAIR  2018    Repair recurrent VH with mesh    HYSTERECTOMY, TOTAL ABDOMINAL (CERVIX REMOVED)  2016    DR RAYA (partial)    NERVE SURGERY N/A 2021    PERMANENT DORSAL COLUMN STIMULATOR PLACEMENT performed by Eliceo Caballero MD at [facility] [place]    SPINE SURGERY  , ,     lumbar    STIMULATOR SURGERY N/A 2021    DORSAL COLUMN STIMULATOR TRIAL performed by Eliceo Caballero MD at [facility] [place]    TUBAL LIGATION      at age 36    UMBILICAL HERNIA REPAIR  10/20/2015    ADEEL JEFFREY MD    VENTRAL HERNIA REPAIR N/A 2018    With mesh

## 2024-01-29 ENCOUNTER — TELEPHONE (OUTPATIENT)
Dept: PAIN MANAGEMENT | Age: 49
End: 2024-01-29

## 2024-01-29 NOTE — TELEPHONE ENCOUNTER
JUSTIN L345 RFA    NO AUTH REQURIED    OK to schedule procedure approved as above.   Please note sides/levels approved and date range.   (If applicable, sides/levels approved may differ from those ordered)    TO BE SCHEDULED WITH DR PAUL

## 2024-02-05 ENCOUNTER — OFFICE VISIT (OUTPATIENT)
Dept: PAIN MANAGEMENT | Age: 49
End: 2024-02-05
Payer: COMMERCIAL

## 2024-02-05 DIAGNOSIS — M47.816 LUMBAR SPONDYLOSIS: ICD-10-CM

## 2024-02-05 PROCEDURE — 64636 DESTROY L/S FACET JNT ADDL: CPT | Performed by: PAIN MEDICINE

## 2024-02-05 PROCEDURE — 64635 DESTROY LUMB/SAC FACET JNT: CPT | Performed by: PAIN MEDICINE

## 2024-02-05 RX ORDER — LIDOCAINE HYDROCHLORIDE 10 MG/ML
3 INJECTION, SOLUTION EPIDURAL; INFILTRATION; INTRACAUDAL; PERINEURAL ONCE
Status: COMPLETED | OUTPATIENT
Start: 2024-02-05 | End: 2024-02-05

## 2024-02-05 RX ORDER — BETAMETHASONE SODIUM PHOSPHATE AND BETAMETHASONE ACETATE 3; 3 MG/ML; MG/ML
6 INJECTION, SUSPENSION INTRA-ARTICULAR; INTRALESIONAL; INTRAMUSCULAR; SOFT TISSUE ONCE
Status: COMPLETED | OUTPATIENT
Start: 2024-02-05 | End: 2024-02-05

## 2024-02-05 RX ADMIN — LIDOCAINE HYDROCHLORIDE 3 ML: 10 INJECTION, SOLUTION EPIDURAL; INFILTRATION; INTRACAUDAL; PERINEURAL at 08:40

## 2024-02-05 RX ADMIN — BETAMETHASONE SODIUM PHOSPHATE AND BETAMETHASONE ACETATE 6 MG: 3; 3 INJECTION, SUSPENSION INTRA-ARTICULAR; INTRALESIONAL; INTRAMUSCULAR; SOFT TISSUE at 08:41

## 2024-02-05 NOTE — PROGRESS NOTES
[facility]  Neurosurgery [facility]  [address], [address]  [place], [place]  P: [phone]  F: [phone]      Lumbar Radio Frequency Ablation     Provider: BRIELLE PAUL DO          Patient Name: Sabine Rosenberg : 1975        Date: 2024      Sabine Rosenberg is here today for interventional pain management.  Standard ASI guidelines were followed and sterile technique used.  Area was cleaned with Betadine x3.  Informed consent was obtained.  Fluoroscopic guidance was used for this procedure. Multiple views of fluoroscopy were used during procedure to assist with needle placement. Appropriate sized RF 10mm active tip needle was used and advance to appropriate anatomic location.    There was appropriate multifidus contraction noted with motor stimulation at 2 Hz between 0.5-1.5 volts. No limb or gluteal contraction was noted taking it up to 3.5 volts. Prior to lesioning at 80 degrees Celsius for 90 seconds, approximately 0.75mg/1mg of Celestone and ½ cc of 1% preservative free Lidocaine was injected. Impedance was between 200-500 ohms during the procedure.     Patient tolerated the procedure well, no obvious complications occurred during the procedure.  Patient was appropriately monitored and discharged home in stable condition with their usual motor strength. Post Op instructions were given to patient.          [x] Bilateral [] T11 [] L1 [] S1     [] T12 [] L2 [] S2    [] Right  [x] L3 [] S3      [x] L4 [] S4    [] Left  [x] L5                              BRIELLE PAUL DO

## 2024-02-21 ENCOUNTER — OFFICE VISIT (OUTPATIENT)
Dept: FAMILY MEDICINE CLINIC | Age: 49
End: 2024-02-21
Payer: COMMERCIAL

## 2024-02-21 VITALS
SYSTOLIC BLOOD PRESSURE: 108 MMHG | HEIGHT: 64 IN | TEMPERATURE: 98.2 F | HEART RATE: 78 BPM | OXYGEN SATURATION: 98 % | WEIGHT: 207.1 LBS | BODY MASS INDEX: 35.36 KG/M2 | DIASTOLIC BLOOD PRESSURE: 68 MMHG

## 2024-02-21 PROCEDURE — G8427 DOCREV CUR MEDS BY ELIG CLIN: HCPCS | Performed by: NURSE PRACTITIONER

## 2024-02-21 PROCEDURE — G8417 CALC BMI ABV UP PARAM F/U: HCPCS | Performed by: NURSE PRACTITIONER

## 2024-02-21 PROCEDURE — 1036F TOBACCO NON-USER: CPT | Performed by: NURSE PRACTITIONER

## 2024-02-21 PROCEDURE — G8484 FLU IMMUNIZE NO ADMIN: HCPCS | Performed by: NURSE PRACTITIONER

## 2024-02-21 PROCEDURE — 99213 OFFICE O/P EST LOW 20 MIN: CPT | Performed by: NURSE PRACTITIONER

## 2024-02-21 RX ORDER — SEMAGLUTIDE 2.4 MG/.75ML
INJECTION, SOLUTION SUBCUTANEOUS
Qty: 4 ML | Refills: 2 | Status: SHIPPED | OUTPATIENT
Start: 2024-02-21

## 2024-02-21 RX ORDER — PHENTERMINE HYDROCHLORIDE 37.5 MG/1
37.5 TABLET ORAL
Qty: 30 TABLET | Refills: 0 | Status: SHIPPED | OUTPATIENT
Start: 2024-02-21 | End: 2024-03-22

## 2024-02-21 ASSESSMENT — ENCOUNTER SYMPTOMS
SHORTNESS OF BREATH: 0
COUGH: 0

## 2024-02-21 ASSESSMENT — PATIENT HEALTH QUESTIONNAIRE - PHQ9
7. TROUBLE CONCENTRATING ON THINGS, SUCH AS READING THE NEWSPAPER OR WATCHING TELEVISION: 0
SUM OF ALL RESPONSES TO PHQ9 QUESTIONS 1 & 2: 0
4. FEELING TIRED OR HAVING LITTLE ENERGY: 0
5. POOR APPETITE OR OVEREATING: 0
SUM OF ALL RESPONSES TO PHQ QUESTIONS 1-9: 0
SUM OF ALL RESPONSES TO PHQ QUESTIONS 1-9: 0
8. MOVING OR SPEAKING SO SLOWLY THAT OTHER PEOPLE COULD HAVE NOTICED. OR THE OPPOSITE, BEING SO FIGETY OR RESTLESS THAT YOU HAVE BEEN MOVING AROUND A LOT MORE THAN USUAL: 0
3. TROUBLE FALLING OR STAYING ASLEEP: 0
9. THOUGHTS THAT YOU WOULD BE BETTER OFF DEAD, OR OF HURTING YOURSELF: 0
2. FEELING DOWN, DEPRESSED OR HOPELESS: 0
SUM OF ALL RESPONSES TO PHQ QUESTIONS 1-9: 0
1. LITTLE INTEREST OR PLEASURE IN DOING THINGS: 0
SUM OF ALL RESPONSES TO PHQ QUESTIONS 1-9: 0
10. IF YOU CHECKED OFF ANY PROBLEMS, HOW DIFFICULT HAVE THESE PROBLEMS MADE IT FOR YOU TO DO YOUR WORK, TAKE CARE OF THINGS AT HOME, OR GET ALONG WITH OTHER PEOPLE: 0
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
## 2024-03-20 ENCOUNTER — OFFICE VISIT (OUTPATIENT)
Dept: FAMILY MEDICINE CLINIC | Age: 49
End: 2024-03-20
Payer: COMMERCIAL

## 2024-03-20 VITALS
DIASTOLIC BLOOD PRESSURE: 70 MMHG | SYSTOLIC BLOOD PRESSURE: 108 MMHG | WEIGHT: 200.8 LBS | OXYGEN SATURATION: 97 % | HEIGHT: 64 IN | TEMPERATURE: 97.7 F | HEART RATE: 86 BPM | BODY MASS INDEX: 34.28 KG/M2

## 2024-03-20 DIAGNOSIS — E66.09 CLASS 1 OBESITY DUE TO EXCESS CALORIES WITH SERIOUS COMORBIDITY AND BODY MASS INDEX (BMI) OF 34.0 TO 34.9 IN ADULT: Primary | ICD-10-CM

## 2024-03-20 PROCEDURE — G8417 CALC BMI ABV UP PARAM F/U: HCPCS | Performed by: NURSE PRACTITIONER

## 2024-03-20 PROCEDURE — G8484 FLU IMMUNIZE NO ADMIN: HCPCS | Performed by: NURSE PRACTITIONER

## 2024-03-20 PROCEDURE — 99213 OFFICE O/P EST LOW 20 MIN: CPT | Performed by: NURSE PRACTITIONER

## 2024-03-20 PROCEDURE — G8427 DOCREV CUR MEDS BY ELIG CLIN: HCPCS | Performed by: NURSE PRACTITIONER

## 2024-03-20 PROCEDURE — 1036F TOBACCO NON-USER: CPT | Performed by: NURSE PRACTITIONER

## 2024-03-20 RX ORDER — PHENTERMINE HYDROCHLORIDE 37.5 MG/1
37.5 TABLET ORAL
Qty: 30 TABLET | Refills: 0 | Status: SHIPPED | OUTPATIENT
Start: 2024-03-20 | End: 2024-04-19

## 2024-03-20 SDOH — ECONOMIC STABILITY: FOOD INSECURITY: WITHIN THE PAST 12 MONTHS, YOU WORRIED THAT YOUR FOOD WOULD RUN OUT BEFORE YOU GOT MONEY TO BUY MORE.: NEVER TRUE

## 2024-03-20 SDOH — ECONOMIC STABILITY: FOOD INSECURITY: WITHIN THE PAST 12 MONTHS, THE FOOD YOU BOUGHT JUST DIDN'T LAST AND YOU DIDN'T HAVE MONEY TO GET MORE.: NEVER TRUE

## 2024-03-20 SDOH — ECONOMIC STABILITY: INCOME INSECURITY: HOW HARD IS IT FOR YOU TO PAY FOR THE VERY BASICS LIKE FOOD, HOUSING, MEDICAL CARE, AND HEATING?: NOT HARD AT ALL

## 2024-03-20 ASSESSMENT — ENCOUNTER SYMPTOMS
CONSTIPATION: 0
COUGH: 0
DIARRHEA: 0
SHORTNESS OF BREATH: 0
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
## 2024-04-18 ENCOUNTER — OFFICE VISIT (OUTPATIENT)
Dept: FAMILY MEDICINE CLINIC | Age: 49
End: 2024-04-18
Payer: COMMERCIAL

## 2024-04-18 VITALS
BODY MASS INDEX: 33.8 KG/M2 | DIASTOLIC BLOOD PRESSURE: 70 MMHG | OXYGEN SATURATION: 99 % | SYSTOLIC BLOOD PRESSURE: 104 MMHG | TEMPERATURE: 98.7 F | WEIGHT: 198 LBS | HEART RATE: 96 BPM | HEIGHT: 64 IN

## 2024-04-18 DIAGNOSIS — E66.1 CLASS 1 DRUG-INDUCED OBESITY WITH BODY MASS INDEX (BMI) OF 33.0 TO 33.9 IN ADULT, UNSPECIFIED WHETHER SERIOUS COMORBIDITY PRESENT: Primary | ICD-10-CM

## 2024-04-18 PROCEDURE — 1036F TOBACCO NON-USER: CPT | Performed by: NURSE PRACTITIONER

## 2024-04-18 PROCEDURE — 99213 OFFICE O/P EST LOW 20 MIN: CPT | Performed by: NURSE PRACTITIONER

## 2024-04-18 PROCEDURE — G8417 CALC BMI ABV UP PARAM F/U: HCPCS | Performed by: NURSE PRACTITIONER

## 2024-04-18 PROCEDURE — G8427 DOCREV CUR MEDS BY ELIG CLIN: HCPCS | Performed by: NURSE PRACTITIONER

## 2024-04-18 RX ORDER — PHENTERMINE HYDROCHLORIDE 37.5 MG/1
37.5 TABLET ORAL
Qty: 30 TABLET | Refills: 0 | Status: SHIPPED | OUTPATIENT
Start: 2024-04-18 | End: 2024-05-18

## 2024-04-18 ASSESSMENT — ENCOUNTER SYMPTOMS
COUGH: 0
SHORTNESS OF BREATH: 0
CONSTIPATION: 0
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
## 2024-04-23 DIAGNOSIS — M96.1 POSTLAMINECTOMY SYNDROME, LUMBAR REGION: ICD-10-CM

## 2024-04-23 DIAGNOSIS — M47.816 LUMBAR SPONDYLOSIS: ICD-10-CM

## 2024-04-23 DIAGNOSIS — M47.812 CERVICAL SPONDYLOSIS: ICD-10-CM

## 2024-04-25 ENCOUNTER — OFFICE VISIT (OUTPATIENT)
Dept: PAIN MANAGEMENT | Age: 49
End: 2024-04-25
Payer: COMMERCIAL

## 2024-04-25 VITALS
SYSTOLIC BLOOD PRESSURE: 104 MMHG | DIASTOLIC BLOOD PRESSURE: 70 MMHG | HEIGHT: 64 IN | WEIGHT: 198 LBS | BODY MASS INDEX: 33.8 KG/M2 | TEMPERATURE: 96.9 F

## 2024-04-25 DIAGNOSIS — M47.812 CERVICAL SPONDYLOSIS: Primary | ICD-10-CM

## 2024-04-25 DIAGNOSIS — M96.1 POSTLAMINECTOMY SYNDROME, LUMBAR REGION: ICD-10-CM

## 2024-04-25 DIAGNOSIS — M54.14 THORACIC RADICULOPATHY: ICD-10-CM

## 2024-04-25 DIAGNOSIS — Z98.1 HX OF FUSION OF CERVICAL SPINE: ICD-10-CM

## 2024-04-25 DIAGNOSIS — M62.838 MUSCLE SPASM: ICD-10-CM

## 2024-04-25 DIAGNOSIS — M47.816 LUMBAR SPONDYLOSIS: ICD-10-CM

## 2024-04-25 DIAGNOSIS — F40.240 CLAUSTROPHOBIA: ICD-10-CM

## 2024-04-25 DIAGNOSIS — M54.6 THORACIC SPINE PAIN: ICD-10-CM

## 2024-04-25 PROCEDURE — G8427 DOCREV CUR MEDS BY ELIG CLIN: HCPCS | Performed by: NURSE PRACTITIONER

## 2024-04-25 PROCEDURE — 99214 OFFICE O/P EST MOD 30 MIN: CPT | Performed by: NURSE PRACTITIONER

## 2024-04-25 PROCEDURE — G8417 CALC BMI ABV UP PARAM F/U: HCPCS | Performed by: NURSE PRACTITIONER

## 2024-04-25 PROCEDURE — 1036F TOBACCO NON-USER: CPT | Performed by: NURSE PRACTITIONER

## 2024-04-25 RX ORDER — CYCLOBENZAPRINE HCL 10 MG
10 TABLET ORAL NIGHTLY PRN
Qty: 30 TABLET | Refills: 2 | Status: SHIPPED | OUTPATIENT
Start: 2024-04-25 | End: 2024-07-24

## 2024-04-25 RX ORDER — DIAZEPAM 5 MG/1
TABLET ORAL
Qty: 2 TABLET | Refills: 1 | Status: SHIPPED | OUTPATIENT
Start: 2024-04-25 | End: 2024-05-04

## 2024-04-25 ASSESSMENT — ENCOUNTER SYMPTOMS
DIARRHEA: 0
CONSTIPATION: 0
EYES NEGATIVE: 1
COUGH: 0
BACK PAIN: 1
TROUBLE SWALLOWING: 0
SHORTNESS OF BREATH: 0
GASTROINTESTINAL NEGATIVE: 1

## 2024-04-25 NOTE — PROGRESS NOTES
DC DSTR NROLYTC AGNT PARVERTEB FCT SNGL CRVCL/THORA    DC DSTR NROLYTC AGNT PARVERTEB FCT ADDL CRVCL/THORA    diclofenac (VOLTAREN) 50 MG EC tablet      2. Hx of fusion of cervical spine        3. Thoracic spine pain  MRI THORACIC SPINE WO CONTRAST      4. Postlaminectomy syndrome, lumbar region  diclofenac (VOLTAREN) 50 MG EC tablet      5. Thoracic radiculopathy  MRI THORACIC SPINE WO CONTRAST      6. Lumbar spondylosis  diclofenac (VOLTAREN) 50 MG EC tablet      7. Muscle spasm  cyclobenzaprine (FLEXERIL) 10 MG tablet      8. Claustrophobia  diazePAM (VALIUM) 5 MG tablet          Plan:          Orders Placed This Encounter   Medications    diclofenac (VOLTAREN) 50 MG EC tablet     Sig: Take 1 tablet by mouth 2 times daily     Dispense:  60 tablet     Refill:  2    cyclobenzaprine (FLEXERIL) 10 MG tablet     Sig: Take 1 tablet by mouth nightly as needed for Muscle spasms     Dispense:  30 tablet     Refill:  2    diazePAM (VALIUM) 5 MG tablet     Sig: Take 30 min prior to MRI, may repeat x1  - pt needs  to and from MRI d/t medication     Dispense:  2 tablet     Refill:  1       Orders Placed This Encounter   Procedures    MRI THORACIC SPINE WO CONTRAST     Standing Status:   Future     Standing Expiration Date:   7/24/2024     Order Specific Question:   Reason for exam:     Answer:   radiating thoracic spine pain    DC DSTR NROLYTC AGNT PARVERTEB FCT SNGL CRVCL/THORA     B/L C6,7,T1 RFA with SK     Standing Status:   Future     Standing Expiration Date:   7/24/2024    DC DSTR NROLYTC AGNT PARVERTEB FCT ADDL CRVCL/THORA     Standing Status:   Future     Standing Expiration Date:   7/24/2024     Discussed options with the patient today. Anatomic model pathology was shown and reviewed with pt.  Will continue flexeril 10mg QHS PRN and voltaren 50mg BID with refills.  She is doing better s/p lumbar RF ablation.  We will go ahead and order  bilateral C6,7,T1 RF ablation with Dr. Guidry as pt has had >80% pain

## 2024-04-29 ENCOUNTER — TELEPHONE (OUTPATIENT)
Dept: PAIN MANAGEMENT | Age: 49
End: 2024-04-29

## 2024-04-29 NOTE — TELEPHONE ENCOUNTER
PLEASE CONTACT PATIENT AND OBTAIN THE PERCENTAGE OF PAIN RELIEF PATIENT HAD FROM PREVIOUS CERVICAL RFA

## 2024-04-29 NOTE — TELEPHONE ENCOUNTER
Patient stated that she got 75% relief the first 4 months and then about 50% for months 5 and 6 and then it dwindled down to 25% not to longer after the 6th month.

## 2024-04-30 ENCOUNTER — TELEPHONE (OUTPATIENT)
Dept: PAIN MANAGEMENT | Age: 49
End: 2024-04-30

## 2024-04-30 NOTE — TELEPHONE ENCOUNTER
JUSTIN C6,7,T1 RFA     NO AUTH REQUIRED    OK to schedule procedure approved as above.   Please note sides/levels approved and date range.   (If applicable, sides/levels approved may differ from those ordered)    TO BE SCHEDULED WITH

## 2024-05-03 ENCOUNTER — HOSPITAL ENCOUNTER (OUTPATIENT)
Dept: MRI IMAGING | Age: 49
Discharge: HOME | End: 2024-05-03
Payer: COMMERCIAL

## 2024-05-03 DIAGNOSIS — M54.14 THORACIC RADICULOPATHY: ICD-10-CM

## 2024-05-03 DIAGNOSIS — M54.6 THORACIC SPINE PAIN: ICD-10-CM

## 2024-05-03 PROCEDURE — 72146 MRI CHEST SPINE W/O DYE: CPT

## 2024-05-06 ENCOUNTER — TELEPHONE (OUTPATIENT)
Dept: PAIN MANAGEMENT | Age: 49
End: 2024-05-06

## 2024-05-06 NOTE — TELEPHONE ENCOUNTER
----- Message from MAURICIO Bush CNP sent at 5/6/2024  8:07 AM EDT -----  MRI report of the thoracic spine dated 5/4/2024 reviewed.  No evidence of fracture.  No evidence of mass or hematoma.  There is a left paracentral broad-based disc herniation T6-7 with moderate left neuroforaminal narrowing present.  Left paracentral broad-based disc herniation at T7-8 and a right paracental broad-based disc  herniation at T8-9.  Report notes these as mild with no central canal stenosis noted.    MA, please tell patient MRI report of her thoracic spine does show some mild disc herniations without any significant narrowing.  She does have some moderate narrowing at T6-7 on the Lt, however.  Will discuss further at follow-up and discuss treatment options.

## 2024-05-07 ENCOUNTER — OFFICE VISIT (OUTPATIENT)
Dept: PAIN MANAGEMENT | Age: 49
End: 2024-05-07
Payer: COMMERCIAL

## 2024-05-07 DIAGNOSIS — M47.812 CERVICAL SPONDYLOSIS: ICD-10-CM

## 2024-05-07 PROCEDURE — 64634 DESTROY C/TH FACET JNT ADDL: CPT | Performed by: PAIN MEDICINE

## 2024-05-07 PROCEDURE — 64633 DESTROY CERV/THOR FACET JNT: CPT | Performed by: PAIN MEDICINE

## 2024-05-07 RX ORDER — DEXAMETHASONE SODIUM PHOSPHATE 10 MG/ML
10 INJECTION, SOLUTION INTRAMUSCULAR; INTRAVENOUS ONCE
Status: COMPLETED | OUTPATIENT
Start: 2024-05-07 | End: 2024-05-07

## 2024-05-07 RX ORDER — LIDOCAINE HYDROCHLORIDE 10 MG/ML
3 INJECTION, SOLUTION EPIDURAL; INFILTRATION; INTRACAUDAL; PERINEURAL ONCE
Status: COMPLETED | OUTPATIENT
Start: 2024-05-07 | End: 2024-05-07

## 2024-05-07 RX ADMIN — LIDOCAINE HYDROCHLORIDE 3 ML: 10 INJECTION, SOLUTION EPIDURAL; INFILTRATION; INTRACAUDAL; PERINEURAL at 08:46

## 2024-05-07 RX ADMIN — DEXAMETHASONE SODIUM PHOSPHATE 10 MG: 10 INJECTION, SOLUTION INTRAMUSCULAR; INTRAVENOUS at 08:46

## 2024-05-07 NOTE — PROGRESS NOTES
Procedure: B c6,c7,T1 cervical radiofrequency medial branch ablation using fluoroscopic needle guidance.    Timeout taken to identify correct patient, procedure and side.    Patient lying in the prone position the patient was prepped and draped in the usual sterile fashion using Betadine.  The levels were determined under fluoroscopy.  1% preservative-free lidocaine was used to numb the skin using a 27-gauge 1-1/2 inch needle.  Radiofrequency needle was introduced to the anatomic location of the medial branch at the lateral masses utilizing intermittent fluoroscopy.  Motor stimulation up to 2 V was done to confirm no ablation of the ventral ramus at each level.  Prior to lesioning at 80 °C for 90 seconds 1.5 mL of 1% preservative-free lidocaine along with 10 mg dexamethasone preservative-free was divided equally amongst the levels and injected with negative aspiration.  This was done at each level.    The procedure was tolerated well without complications.  The patient was monitored after procedure, had their usual motor strength.  And discharged home in stable condition.  Patient will ice the area and  take it easy.  All questions answered, chart was reviewed.

## 2024-05-16 ENCOUNTER — OFFICE VISIT (OUTPATIENT)
Dept: FAMILY MEDICINE CLINIC | Age: 49
End: 2024-05-16
Payer: COMMERCIAL

## 2024-05-16 VITALS
DIASTOLIC BLOOD PRESSURE: 84 MMHG | WEIGHT: 194 LBS | BODY MASS INDEX: 33.12 KG/M2 | HEIGHT: 64 IN | TEMPERATURE: 97.9 F | HEART RATE: 98 BPM | OXYGEN SATURATION: 100 % | SYSTOLIC BLOOD PRESSURE: 118 MMHG

## 2024-05-16 DIAGNOSIS — M54.9 UPPER BACK PAIN: ICD-10-CM

## 2024-05-16 DIAGNOSIS — M51.24 THORACIC DISC HERNIATION: ICD-10-CM

## 2024-05-16 DIAGNOSIS — M54.9 UPPER BACK PAIN: Primary | ICD-10-CM

## 2024-05-16 DIAGNOSIS — E66.1 CLASS 1 DRUG-INDUCED OBESITY WITH BODY MASS INDEX (BMI) OF 33.0 TO 33.9 IN ADULT, UNSPECIFIED WHETHER SERIOUS COMORBIDITY PRESENT: ICD-10-CM

## 2024-05-16 LAB
ALBUMIN SERPL-MCNC: 4.6 G/DL (ref 3.5–4.6)
ALP SERPL-CCNC: 58 U/L (ref 40–130)
ALT SERPL-CCNC: 25 U/L (ref 0–33)
ANION GAP SERPL CALCULATED.3IONS-SCNC: 13 MEQ/L (ref 9–15)
AST SERPL-CCNC: 19 U/L (ref 0–35)
BASOPHILS # BLD: 0 K/UL (ref 0–0.2)
BASOPHILS NFR BLD: 0.5 %
BILIRUB SERPL-MCNC: 0.4 MG/DL (ref 0.2–0.7)
BUN SERPL-MCNC: 10 MG/DL (ref 6–20)
CALCIUM SERPL-MCNC: 9.6 MG/DL (ref 8.5–9.9)
CHLORIDE SERPL-SCNC: 103 MEQ/L (ref 95–107)
CO2 SERPL-SCNC: 24 MEQ/L (ref 20–31)
CREAT SERPL-MCNC: 0.69 MG/DL (ref 0.5–0.9)
EOSINOPHIL # BLD: 0.2 K/UL (ref 0–0.7)
EOSINOPHIL NFR BLD: 3.1 %
ERYTHROCYTE [DISTWIDTH] IN BLOOD BY AUTOMATED COUNT: 13.3 % (ref 11.5–14.5)
GLOBULIN SER CALC-MCNC: 3.1 G/DL (ref 2.3–3.5)
GLUCOSE SERPL-MCNC: 87 MG/DL (ref 70–99)
HCT VFR BLD AUTO: 43.5 % (ref 37–47)
HGB BLD-MCNC: 14.3 G/DL (ref 12–16)
LIPASE SERPL-CCNC: 39 U/L (ref 12–95)
LYMPHOCYTES # BLD: 2.8 K/UL (ref 1–4.8)
LYMPHOCYTES NFR BLD: 36.7 %
MCH RBC QN AUTO: 29.3 PG (ref 27–31.3)
MCHC RBC AUTO-ENTMCNC: 32.9 % (ref 33–37)
MCV RBC AUTO: 89.1 FL (ref 79.4–94.8)
MONOCYTES # BLD: 0.5 K/UL (ref 0.2–0.8)
MONOCYTES NFR BLD: 6.3 %
NEUTROPHILS # BLD: 4.1 K/UL (ref 1.4–6.5)
NEUTS SEG NFR BLD: 53.3 %
PLATELET # BLD AUTO: 362 K/UL (ref 130–400)
POTASSIUM SERPL-SCNC: 4.8 MEQ/L (ref 3.4–4.9)
PROT SERPL-MCNC: 7.7 G/DL (ref 6.3–8)
RBC # BLD AUTO: 4.88 M/UL (ref 4.2–5.4)
SODIUM SERPL-SCNC: 140 MEQ/L (ref 135–144)
WBC # BLD AUTO: 7.6 K/UL (ref 4.8–10.8)

## 2024-05-16 PROCEDURE — G8417 CALC BMI ABV UP PARAM F/U: HCPCS | Performed by: NURSE PRACTITIONER

## 2024-05-16 PROCEDURE — 99214 OFFICE O/P EST MOD 30 MIN: CPT | Performed by: NURSE PRACTITIONER

## 2024-05-16 PROCEDURE — G8427 DOCREV CUR MEDS BY ELIG CLIN: HCPCS | Performed by: NURSE PRACTITIONER

## 2024-05-16 PROCEDURE — 1036F TOBACCO NON-USER: CPT | Performed by: NURSE PRACTITIONER

## 2024-05-16 RX ORDER — PHENTERMINE HYDROCHLORIDE 37.5 MG/1
37.5 TABLET ORAL
Qty: 30 TABLET | Refills: 2 | Status: SHIPPED | OUTPATIENT
Start: 2024-05-16 | End: 2024-08-14

## 2024-05-16 ASSESSMENT — ENCOUNTER SYMPTOMS
BACK PAIN: 1
SHORTNESS OF BREATH: 0
COUGH: 0
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
## 2024-05-20 NOTE — PROGRESS NOTES
atraumatic.      Right Ear: External ear normal.      Left Ear: External ear normal.      Nose: Nose normal.      Mouth/Throat:      Pharynx: Oropharynx is clear.   Eyes:      Extraocular Movements: Extraocular movements intact.   Cardiovascular:      Pulses: Normal pulses.   Pulmonary:      Effort: Pulmonary effort is normal.   Abdominal:      Palpations: Abdomen is soft.   Genitourinary:     Rectum: Normal.   Musculoskeletal:         General: Normal range of motion.      Cervical back: Normal range of motion.      Comments: Point tenderness on the inferior medial aspect of the scaphoid bone.  No winging of this shoulder blade.  Right arm extended pushing back recreates some of the pain.  Area of the rhomboid muscle   Skin:     General: Skin is warm.      Comments: Well-healed lumbar scars   Neurological:      General: No focal deficit present.   Psychiatric:         Mood and Affect: Mood normal.          I have reviewed all laboratory studies, reports, data, and pertinent images.    MRI OF THE THORACIC SPINE WITHOUT CONTRAST  5/3/2024 1:40 pm     TECHNIQUE:  Multiplanar multisequence MRI of the thoracic spine was performed without the  administration of intravenous contrast.     COMPARISON:  None.     HISTORY:  ORDERING SYSTEM PROVIDED HISTORY: Thoracic spine pain, Thoracic radiculopathy  TECHNOLOGIST PROVIDED HISTORY:  Reason for exam:->radiating thoracic spine pain  What reading provider will be dictating this exam?->CRC     FINDINGS:  No evidence of fracture or malalignment.  Moderate disc space narrowing is  present at multiple midthoracic segments.  No prevertebral soft tissue edema.  Normal signal associated with the thoracic spinal cord.  No evidence of  epidural mass or hematoma.  There is a left paracentral, broad-based disc  herniation at T6/T7.  Moderate left neural foraminal narrowing is also  present at this level.  Left paracentral broad-based disc herniation is also  present at T7/T8 and broad-based

## 2024-05-28 ENCOUNTER — INITIAL CONSULT (OUTPATIENT)
Dept: NEUROSURGERY | Age: 49
End: 2024-05-28
Payer: COMMERCIAL

## 2024-05-28 VITALS
SYSTOLIC BLOOD PRESSURE: 116 MMHG | HEIGHT: 64 IN | DIASTOLIC BLOOD PRESSURE: 68 MMHG | BODY MASS INDEX: 33.12 KG/M2 | WEIGHT: 194 LBS | TEMPERATURE: 97 F

## 2024-05-28 DIAGNOSIS — Z98.1 HISTORY OF FUSION OF CERVICAL SPINE: ICD-10-CM

## 2024-05-28 DIAGNOSIS — S29.019A THORACIC MYOFASCIAL STRAIN, INITIAL ENCOUNTER: Primary | ICD-10-CM

## 2024-05-28 DIAGNOSIS — M47.812 CERVICAL SPONDYLOSIS: ICD-10-CM

## 2024-05-28 PROCEDURE — 99213 OFFICE O/P EST LOW 20 MIN: CPT | Performed by: NEUROLOGICAL SURGERY

## 2024-05-28 PROCEDURE — 1036F TOBACCO NON-USER: CPT | Performed by: NEUROLOGICAL SURGERY

## 2024-05-28 PROCEDURE — G8417 CALC BMI ABV UP PARAM F/U: HCPCS | Performed by: NEUROLOGICAL SURGERY

## 2024-05-28 PROCEDURE — G8427 DOCREV CUR MEDS BY ELIG CLIN: HCPCS | Performed by: NEUROLOGICAL SURGERY

## 2024-05-28 ASSESSMENT — ENCOUNTER SYMPTOMS
EYE PAIN: 0
BACK PAIN: 1
CONSTIPATION: 1
SHORTNESS OF BREATH: 0
NAUSEA: 0

## 2024-07-11 ENCOUNTER — OFFICE VISIT (OUTPATIENT)
Dept: PAIN MANAGEMENT | Age: 49
End: 2024-07-11
Payer: COMMERCIAL

## 2024-07-11 VITALS
HEIGHT: 64 IN | SYSTOLIC BLOOD PRESSURE: 114 MMHG | BODY MASS INDEX: 32.27 KG/M2 | WEIGHT: 189 LBS | DIASTOLIC BLOOD PRESSURE: 68 MMHG | TEMPERATURE: 97.5 F

## 2024-07-11 DIAGNOSIS — M47.814 THORACIC SPONDYLOSIS: Primary | ICD-10-CM

## 2024-07-11 DIAGNOSIS — M47.812 CERVICAL SPONDYLOSIS: ICD-10-CM

## 2024-07-11 DIAGNOSIS — M47.816 LUMBAR SPONDYLOSIS: ICD-10-CM

## 2024-07-11 DIAGNOSIS — M62.838 MUSCLE SPASM: ICD-10-CM

## 2024-07-11 DIAGNOSIS — M96.1 POSTLAMINECTOMY SYNDROME, LUMBAR REGION: ICD-10-CM

## 2024-07-11 PROCEDURE — 1036F TOBACCO NON-USER: CPT | Performed by: NURSE PRACTITIONER

## 2024-07-11 PROCEDURE — 99214 OFFICE O/P EST MOD 30 MIN: CPT | Performed by: NURSE PRACTITIONER

## 2024-07-11 PROCEDURE — G8417 CALC BMI ABV UP PARAM F/U: HCPCS | Performed by: NURSE PRACTITIONER

## 2024-07-11 PROCEDURE — G8427 DOCREV CUR MEDS BY ELIG CLIN: HCPCS | Performed by: NURSE PRACTITIONER

## 2024-07-11 RX ORDER — CYCLOBENZAPRINE HCL 10 MG
10 TABLET ORAL NIGHTLY PRN
Qty: 30 TABLET | Refills: 2 | Status: SHIPPED | OUTPATIENT
Start: 2024-07-11 | End: 2024-10-09

## 2024-07-11 ASSESSMENT — ENCOUNTER SYMPTOMS
GASTROINTESTINAL NEGATIVE: 1
BACK PAIN: 1
SHORTNESS OF BREATH: 0
DIARRHEA: 0
TROUBLE SWALLOWING: 0
CONSTIPATION: 0
EYES NEGATIVE: 1
COUGH: 0

## 2024-07-11 NOTE — PROGRESS NOTES
functioning, and can impact sleep, mood,  and relationships at times, but medication seems to help with these.   Pt feels pain level using NRS 4/10.  Pt feels that prolonged activity/standing, work makes the pain worse, and medication, procedures, rest, change of position, laying down makes the pain better.   Pt feels her medication helps   her function and improve her quality of life. Pt denies radiating numbness and tingling.  Denies recent falls, injuries or trauma.  Pt denies new weakness. Pt reports PT has been done.           Review of Systems   Constitutional: Negative.  Negative for fatigue.   HENT: Negative.  Negative for trouble swallowing.    Eyes: Negative.    Respiratory:  Negative for cough and shortness of breath.    Cardiovascular:  Negative for chest pain.   Gastrointestinal: Negative.  Negative for constipation and diarrhea.   Endocrine: Negative.    Genitourinary: Negative.    Musculoskeletal:  Positive for back pain and neck pain.   Skin: Negative.    Neurological:  Negative for dizziness, weakness and headaches.   Hematological: Negative.    Psychiatric/Behavioral: Negative.           Objective:     Vitals:  /68 (Site: Left Upper Arm, Position: Sitting, Cuff Size: Large Adult)   Temp 97.5 °F (36.4 °C) (Temporal)   Ht 1.626 m (5' 4\")   Wt 85.7 kg (189 lb)   LMP 03/05/2016   BMI 32.44 kg/m² Pain Score:   4      Physical Exam  Vitals and nursing note reviewed.       This is a pleasant female who answers questions appropriately and follows commands. Pt is alert and oriented x 3.  Recent and remote memory is intact.  Mood and affect, judgement and insight are normal.  No signs of distress, no dyspnea or SOB noted. HEENT: PERRL.  Neck is supple, trachea midline.  No lymphadenopathy noted.  Decreased ROM with flexion, extension and rotation of cervical spine. Tightness in trapezius with palpation noted.    None TTP over  cervical spine, but tightness appreciated over cervical paraspinal

## 2024-07-12 ENCOUNTER — TELEPHONE (OUTPATIENT)
Dept: PAIN MANAGEMENT | Age: 49
End: 2024-07-12

## 2024-07-12 NOTE — TELEPHONE ENCOUNTER
JUSTIN T678 SIMÓN    NO AUTH REQUIRED    OK to schedule procedure approved as above.   Please note sides/levels approved and date range.   (If applicable, sides/levels approved may differ from those ordered)    TO BE SCHEDULED WITH

## 2024-07-17 ENCOUNTER — OFFICE VISIT (OUTPATIENT)
Dept: FAMILY MEDICINE CLINIC | Age: 49
End: 2024-07-17
Payer: COMMERCIAL

## 2024-07-17 VITALS
HEIGHT: 64 IN | DIASTOLIC BLOOD PRESSURE: 82 MMHG | OXYGEN SATURATION: 98 % | BODY MASS INDEX: 32.13 KG/M2 | TEMPERATURE: 98.4 F | HEART RATE: 88 BPM | WEIGHT: 188.2 LBS | SYSTOLIC BLOOD PRESSURE: 116 MMHG

## 2024-07-17 DIAGNOSIS — E66.1 CLASS 1 DRUG-INDUCED OBESITY WITH BODY MASS INDEX (BMI) OF 33.0 TO 33.9 IN ADULT, UNSPECIFIED WHETHER SERIOUS COMORBIDITY PRESENT: ICD-10-CM

## 2024-07-17 PROCEDURE — G8427 DOCREV CUR MEDS BY ELIG CLIN: HCPCS | Performed by: NURSE PRACTITIONER

## 2024-07-17 PROCEDURE — 1036F TOBACCO NON-USER: CPT | Performed by: NURSE PRACTITIONER

## 2024-07-17 PROCEDURE — 99213 OFFICE O/P EST LOW 20 MIN: CPT | Performed by: NURSE PRACTITIONER

## 2024-07-17 PROCEDURE — G8417 CALC BMI ABV UP PARAM F/U: HCPCS | Performed by: NURSE PRACTITIONER

## 2024-07-17 RX ORDER — SEMAGLUTIDE 2.4 MG/.75ML
INJECTION, SOLUTION SUBCUTANEOUS
Qty: 4 ML | Refills: 2 | Status: SHIPPED | OUTPATIENT
Start: 2024-07-17

## 2024-07-17 RX ORDER — PHENTERMINE HYDROCHLORIDE 37.5 MG/1
37.5 TABLET ORAL
Qty: 30 TABLET | Refills: 2 | Status: CANCELLED | OUTPATIENT
Start: 2024-07-17 | End: 2024-10-15

## 2024-07-17 ASSESSMENT — ENCOUNTER SYMPTOMS
CONSTIPATION: 0
COUGH: 0
SHORTNESS OF BREATH: 0
DIARRHEA: 0

## 2024-07-17 NOTE — PROGRESS NOTES
MG/0.75ML SOAJ SC injection REORDER      Side effects, adverse effects of the medication prescribed today, as well as treatment plan/ rationale and result expectations have been discussed with the patient who expresses understanding and desires to proceed.    Close follow up to evaluate treatment results and for coordination of care.  I have reviewed the patient's medical history in detail and updated the computerized patient record.    As always, patient is advised that if symptoms worsen in any way they will proceed to the nearest emergency room.     FU 1 mos.       Flavia Wilkins, APRN - CNP

## 2024-07-22 ENCOUNTER — PROCEDURE VISIT (OUTPATIENT)
Age: 49
End: 2024-07-22
Payer: COMMERCIAL

## 2024-07-22 VITALS
WEIGHT: 188 LBS | OXYGEN SATURATION: 99 % | HEART RATE: 89 BPM | HEIGHT: 64 IN | SYSTOLIC BLOOD PRESSURE: 100 MMHG | DIASTOLIC BLOOD PRESSURE: 70 MMHG | BODY MASS INDEX: 32.1 KG/M2 | TEMPERATURE: 97.8 F

## 2024-07-22 DIAGNOSIS — M47.814 THORACIC SPONDYLOSIS: Primary | ICD-10-CM

## 2024-07-22 PROCEDURE — 64490 INJ PARAVERT F JNT C/T 1 LEV: CPT | Performed by: PAIN MEDICINE

## 2024-07-22 PROCEDURE — 64491 INJ PARAVERT F JNT C/T 2 LEV: CPT | Performed by: PAIN MEDICINE

## 2024-07-22 RX ORDER — BETAMETHASONE SODIUM PHOSPHATE AND BETAMETHASONE ACETATE 3; 3 MG/ML; MG/ML
6 INJECTION, SUSPENSION INTRA-ARTICULAR; INTRALESIONAL; INTRAMUSCULAR; SOFT TISSUE ONCE
Status: COMPLETED | OUTPATIENT
Start: 2024-07-22 | End: 2024-07-22

## 2024-07-22 RX ORDER — LIDOCAINE HYDROCHLORIDE 10 MG/ML
3 INJECTION, SOLUTION EPIDURAL; INFILTRATION; INTRACAUDAL; PERINEURAL ONCE
Status: COMPLETED | OUTPATIENT
Start: 2024-07-22 | End: 2024-07-22

## 2024-07-22 RX ADMIN — LIDOCAINE HYDROCHLORIDE 3 ML: 10 INJECTION, SOLUTION EPIDURAL; INFILTRATION; INTRACAUDAL; PERINEURAL at 08:43

## 2024-07-22 RX ADMIN — BETAMETHASONE SODIUM PHOSPHATE AND BETAMETHASONE ACETATE 6 MG: 3; 3 INJECTION, SUSPENSION INTRA-ARTICULAR; INTRALESIONAL; INTRAMUSCULAR at 08:42

## 2024-07-22 NOTE — PROGRESS NOTES
[facility]  Neurosurgery and [facility]  [address], [address]  [place], [place]  P: [phone]  F: [phone]        Thoracic MEDIAL BRANCH BLOCK    Provider: BRIELLE PAUL DO          Patient Name: Sabine Rosenberg : 1975        Date: 2024         Sabine Rosenberg is here today for interventional pain management.  Sterile technique was used.  Fluoroscopic guidance was used. Patient positioned in prone position. Area was cleaned with Betadine. Informed consent was obtained.  Appropriate length spinal needle was advanced to the appropriate thoracic medial branch anatomic utilizing intermittent fluoroscopy. Approximately 6mg betamethasone was divided equally at each level and 0.5cc of 1% PF Lidocaine was injected at each anatomic level without difficulty.    Patient tolerated the procedure well, no obvious complications occurred during the procedure.  Patient was appropriately monitored and discharged home in stable condition with their usual motor strength. The patient will keep close track of pain over the next several hours and call our office tomorrow and let us know what percentage of pain relief is experienced.  Post op Instructions were given to patient. If on blood thiners patient was told to resume them post-procedure. Relevant and recent imaging reviewed with patient today.       Bilateral T6-T7-T8 medial branch performed today      [] Bilateral [] C2 [] C3      [] C4 [] C5     [] Right [] C6 [] C7            [] Left                                      Patient had >80% pain relief following procedure with positive facet joint provocativemaneuvers, schedule second MBB      BRIELLE PAUL DO

## 2024-07-23 ENCOUNTER — TELEPHONE (OUTPATIENT)
Dept: PAIN MANAGEMENT | Age: 49
End: 2024-07-23

## 2024-07-23 NOTE — TELEPHONE ENCOUNTER
SECOND BILAT T6,7,8 MBB     NO AUTH REQUIRED    OK to schedule procedure approved as above.   Please note sides/levels approved and date range.   (If applicable, sides/levels approved may differ from those ordered)    TO BE SCHEDULED WITH DR PAUL

## 2024-07-24 DIAGNOSIS — M96.1 POSTLAMINECTOMY SYNDROME, LUMBAR REGION: ICD-10-CM

## 2024-07-24 DIAGNOSIS — M47.812 CERVICAL SPONDYLOSIS: ICD-10-CM

## 2024-07-24 DIAGNOSIS — M47.816 LUMBAR SPONDYLOSIS: ICD-10-CM

## 2024-08-05 ENCOUNTER — PROCEDURE VISIT (OUTPATIENT)
Age: 49
End: 2024-08-05
Payer: COMMERCIAL

## 2024-08-05 VITALS
TEMPERATURE: 97.8 F | SYSTOLIC BLOOD PRESSURE: 104 MMHG | HEART RATE: 97 BPM | WEIGHT: 188 LBS | DIASTOLIC BLOOD PRESSURE: 78 MMHG | HEIGHT: 64 IN | OXYGEN SATURATION: 98 % | BODY MASS INDEX: 32.1 KG/M2

## 2024-08-05 DIAGNOSIS — M47.814 THORACIC SPONDYLOSIS: Primary | ICD-10-CM

## 2024-08-05 PROCEDURE — 64490 INJ PARAVERT F JNT C/T 1 LEV: CPT | Performed by: PAIN MEDICINE

## 2024-08-05 PROCEDURE — 64491 INJ PARAVERT F JNT C/T 2 LEV: CPT | Performed by: PAIN MEDICINE

## 2024-08-05 RX ORDER — LIDOCAINE HYDROCHLORIDE 10 MG/ML
3 INJECTION, SOLUTION EPIDURAL; INFILTRATION; INTRACAUDAL; PERINEURAL ONCE
Status: COMPLETED | OUTPATIENT
Start: 2024-08-05 | End: 2024-08-05

## 2024-08-05 RX ADMIN — LIDOCAINE HYDROCHLORIDE 3 ML: 10 INJECTION, SOLUTION EPIDURAL; INFILTRATION; INTRACAUDAL; PERINEURAL at 08:48

## 2024-08-05 NOTE — PROGRESS NOTES
[facility]  Neurosurgery and Pain Management Center  [address], [address]  [place], [place]  P: [phone]  F: [phone]      Thoracic MEDIAL BRANCH BLOCK      Provider: BRIELLE PAUL DO          Patient Name: Sabine Rosenberg : 1975        Date: 2024       Sabine Rosenberg is here today for interventional pain management.  Standard ASI guidelines were followed and sterile technique used.  Area was cleaned with Betadine x3.  Informed consent was obtained.  Fluoroscopic guidance was used for this procedure.  Appropriate thoracic medial branch anatomy was identified.. Appropriate length spinal needle was used and advanced to correct anatomic location. Negative aspiration was achieved.  At each site approximately 1/2cc of 1% preservative free Lidocaine was injected without difficulty.This procedure was 30% more difficult and required 30% more work secondary to the patient's habitus. The patient has a BMI of 32 and has comorbidities of hypercholesterolemia and depression and obesity. This required increased work for safe and proper positioning upon the fluoroscopy table, increased needle passes for safe and appropriate needle placement, and increased fluoroscopy time and radiation exposure for proper visualization.        Patient tolerated the procedure well, no obvious complications occurred during the procedure.  Patient was appropriately monitored and discharged home in stable condition with their usual motor strength. The patient will keep close track of pain over the next several hours and call our office tomorrow and let us know what percentage of pain relief is experienced.  Post op Instructions were given to patient. Relevant and recent imaging reviewed with patient today.       Bilateral T6 T7 T8           [] Bilateral [] T12 [] S1      [] L1 [] S2     [] Right [] L2 [] S3      [] L3      [] Left [] L4         [] L5 [] S.I.

## 2024-08-07 ENCOUNTER — TELEPHONE (OUTPATIENT)
Dept: PAIN MANAGEMENT | Age: 49
End: 2024-08-07

## 2024-08-07 NOTE — TELEPHONE ENCOUNTER
LANDEN[facility] FOR PT TO CALL BACK AND SCHEDULE     BILAT T6,7,8 RFA      NO AUTH REQUIRED                 TO BE SCHEDULED WITH DR. PAUL

## 2024-08-07 NOTE — TELEPHONE ENCOUNTER
JUSTIN T6,7,8 RFA     NO AUTH REQUIRED    OK to schedule procedure approved as above.   Please note sides/levels approved and date range.   (If applicable, sides/levels approved may differ from those ordered)    TO BE SCHEDULED WITH DR PAUL

## 2024-08-14 ENCOUNTER — OFFICE VISIT (OUTPATIENT)
Dept: FAMILY MEDICINE CLINIC | Age: 49
End: 2024-08-14
Payer: COMMERCIAL

## 2024-08-14 VITALS
HEIGHT: 64 IN | BODY MASS INDEX: 32.47 KG/M2 | TEMPERATURE: 98 F | DIASTOLIC BLOOD PRESSURE: 78 MMHG | HEART RATE: 90 BPM | OXYGEN SATURATION: 99 % | WEIGHT: 190.2 LBS | SYSTOLIC BLOOD PRESSURE: 106 MMHG

## 2024-08-14 DIAGNOSIS — E66.1 CLASS 1 DRUG-INDUCED OBESITY WITH BODY MASS INDEX (BMI) OF 33.0 TO 33.9 IN ADULT, UNSPECIFIED WHETHER SERIOUS COMORBIDITY PRESENT: ICD-10-CM

## 2024-08-14 PROCEDURE — 99213 OFFICE O/P EST LOW 20 MIN: CPT | Performed by: NURSE PRACTITIONER

## 2024-08-14 PROCEDURE — 1036F TOBACCO NON-USER: CPT | Performed by: NURSE PRACTITIONER

## 2024-08-14 PROCEDURE — G8417 CALC BMI ABV UP PARAM F/U: HCPCS | Performed by: NURSE PRACTITIONER

## 2024-08-14 PROCEDURE — 3017F COLORECTAL CA SCREEN DOC REV: CPT | Performed by: NURSE PRACTITIONER

## 2024-08-14 PROCEDURE — G8427 DOCREV CUR MEDS BY ELIG CLIN: HCPCS | Performed by: NURSE PRACTITIONER

## 2024-08-14 RX ORDER — TOPIRAMATE 25 MG/1
25 TABLET ORAL 2 TIMES DAILY
Qty: 60 TABLET | Refills: 3 | Status: SHIPPED | OUTPATIENT
Start: 2024-08-14

## 2024-08-14 RX ORDER — SEMAGLUTIDE 2.4 MG/.75ML
INJECTION, SOLUTION SUBCUTANEOUS
Qty: 4 ML | Refills: 2 | Status: SHIPPED | OUTPATIENT
Start: 2024-08-14

## 2024-08-14 RX ORDER — PHENTERMINE HYDROCHLORIDE 37.5 MG/1
37.5 TABLET ORAL
Qty: 30 TABLET | Refills: 0 | Status: SHIPPED | OUTPATIENT
Start: 2024-08-14 | End: 2024-09-13

## 2024-08-14 ASSESSMENT — ENCOUNTER SYMPTOMS
COUGH: 0
DIARRHEA: 0
CONSTIPATION: 0
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
## 2024-08-19 ENCOUNTER — OFFICE VISIT (OUTPATIENT)
Age: 49
End: 2024-08-19
Payer: COMMERCIAL

## 2024-08-19 VITALS
HEART RATE: 105 BPM | HEIGHT: 64 IN | DIASTOLIC BLOOD PRESSURE: 70 MMHG | TEMPERATURE: 97.2 F | OXYGEN SATURATION: 100 % | WEIGHT: 190 LBS | BODY MASS INDEX: 32.44 KG/M2 | SYSTOLIC BLOOD PRESSURE: 100 MMHG

## 2024-08-19 DIAGNOSIS — M47.814 THORACIC SPONDYLOSIS: Primary | ICD-10-CM

## 2024-08-19 PROCEDURE — 64634 DESTROY C/TH FACET JNT ADDL: CPT | Performed by: PAIN MEDICINE

## 2024-08-19 PROCEDURE — 64633 DESTROY CERV/THOR FACET JNT: CPT | Performed by: PAIN MEDICINE

## 2024-08-19 RX ORDER — BETAMETHASONE SODIUM PHOSPHATE AND BETAMETHASONE ACETATE 3; 3 MG/ML; MG/ML
6 INJECTION, SUSPENSION INTRA-ARTICULAR; INTRALESIONAL; INTRAMUSCULAR; SOFT TISSUE ONCE
Status: COMPLETED | OUTPATIENT
Start: 2024-08-19 | End: 2024-08-19

## 2024-08-19 RX ORDER — LIDOCAINE HYDROCHLORIDE 10 MG/ML
3 INJECTION, SOLUTION EPIDURAL; INFILTRATION; INTRACAUDAL; PERINEURAL ONCE
Status: COMPLETED | OUTPATIENT
Start: 2024-08-19 | End: 2024-08-19

## 2024-08-19 RX ADMIN — BETAMETHASONE SODIUM PHOSPHATE AND BETAMETHASONE ACETATE 6 MG: 3; 3 INJECTION, SUSPENSION INTRA-ARTICULAR; INTRALESIONAL; INTRAMUSCULAR at 08:20

## 2024-08-19 RX ADMIN — LIDOCAINE HYDROCHLORIDE 3 ML: 10 INJECTION, SOLUTION EPIDURAL; INFILTRATION; INTRACAUDAL; PERINEURAL at 08:20

## 2024-08-19 NOTE — PROGRESS NOTES
[facility]  Neurosurgery and Pain Management Center  [address], [address]  [place], [place]  P: [phone]  F: [phone]      Lumbar Radio Frequency Ablation     Provider: BRIELLE PAUL DO          Patient Name: Sabine Rosenberg : 1975        Date: 2024      Sabine Rosenberg is here today for interventional pain management.  Standard ASI guidelines were followed and sterile technique used.  Area was cleaned with Betadine x3.  Informed consent was obtained.  Fluoroscopic guidance was used for this procedure. Multiple views of fluoroscopy were used during procedure to assist with needle placement. Appropriate sized RF 10mm active tip needle was used and advance to appropriate thoracic medial branch anatomic location.    There was appropriate multifidus contraction noted with motor stimulation at 2 Hz between 0.5-1.5 volts. No limb or gluteal contraction was noted taking it up to 3.5 volts. Prior to lesioning at 80 degrees Celsius for 90 seconds, approximately 0.75mg/1mg of Celestone and ½ cc of 1% preservative free Lidocaine was injected. Impedance was between 200-500 ohms during the procedure.This procedure was 35% more difficult and required 35% more work secondary to the patient's habitus. The patient has a BMI of 33 and has comorbidities of hypercholesterolemia and depression and obesity. This required increased work for safe and proper positioning upon the fluoroscopy table, increased needle passes for safe and appropriate needle placement, and increased fluoroscopy time and radiation exposure for proper visualization.         Patient tolerated the procedure well, no obvious complications occurred during the procedure.  Patient was appropriately monitored and discharged home in stable condition with their usual motor strength. Post Op instructions were given to patient.    Bilateral T6-T7-T8 performed      [] Bilateral [] T11 [] L1 [] S1     []

## 2024-08-23 ENCOUNTER — TELEPHONE (OUTPATIENT)
Age: 49
End: 2024-08-23

## 2024-09-16 ENCOUNTER — OFFICE VISIT (OUTPATIENT)
Dept: FAMILY MEDICINE CLINIC | Age: 49
End: 2024-09-16
Payer: COMMERCIAL

## 2024-09-16 VITALS
TEMPERATURE: 98 F | HEIGHT: 64 IN | BODY MASS INDEX: 31.1 KG/M2 | SYSTOLIC BLOOD PRESSURE: 110 MMHG | WEIGHT: 182.2 LBS | DIASTOLIC BLOOD PRESSURE: 82 MMHG | HEART RATE: 103 BPM | OXYGEN SATURATION: 100 %

## 2024-09-16 DIAGNOSIS — E66.1 CLASS 1 DRUG-INDUCED OBESITY WITH BODY MASS INDEX (BMI) OF 31.0 TO 31.9 IN ADULT, UNSPECIFIED WHETHER SERIOUS COMORBIDITY PRESENT: ICD-10-CM

## 2024-09-16 DIAGNOSIS — J06.9 VIRAL URI: ICD-10-CM

## 2024-09-16 PROCEDURE — 3017F COLORECTAL CA SCREEN DOC REV: CPT | Performed by: NURSE PRACTITIONER

## 2024-09-16 PROCEDURE — 99213 OFFICE O/P EST LOW 20 MIN: CPT | Performed by: NURSE PRACTITIONER

## 2024-09-16 PROCEDURE — 1036F TOBACCO NON-USER: CPT | Performed by: NURSE PRACTITIONER

## 2024-09-16 PROCEDURE — G8417 CALC BMI ABV UP PARAM F/U: HCPCS | Performed by: NURSE PRACTITIONER

## 2024-09-16 PROCEDURE — G8427 DOCREV CUR MEDS BY ELIG CLIN: HCPCS | Performed by: NURSE PRACTITIONER

## 2024-09-16 RX ORDER — PHENTERMINE HYDROCHLORIDE 37.5 MG/1
37.5 TABLET ORAL
Qty: 30 TABLET | Refills: 0 | Status: SHIPPED | OUTPATIENT
Start: 2024-09-16 | End: 2024-10-16

## 2024-09-16 RX ORDER — SEMAGLUTIDE 2.4 MG/.75ML
INJECTION, SOLUTION SUBCUTANEOUS
Qty: 4 ML | Refills: 2 | Status: SHIPPED | OUTPATIENT
Start: 2024-09-16

## 2024-09-16 RX ORDER — PHENTERMINE HYDROCHLORIDE 37.5 MG/1
37.5 TABLET ORAL
COMMUNITY
Start: 2024-08-31 | End: 2024-09-16 | Stop reason: SDUPTHER

## 2024-09-16 ASSESSMENT — ENCOUNTER SYMPTOMS
COLOR CHANGE: 0
CHEST TIGHTNESS: 0
VOMITING: 0
NAUSEA: 0
SORE THROAT: 0
RHINORRHEA: 0
EYES NEGATIVE: 1
SHORTNESS OF BREATH: 0

## 2024-10-09 ENCOUNTER — OFFICE VISIT (OUTPATIENT)
Age: 49
End: 2024-10-09
Payer: COMMERCIAL

## 2024-10-09 VITALS
DIASTOLIC BLOOD PRESSURE: 70 MMHG | WEIGHT: 182 LBS | BODY MASS INDEX: 31.07 KG/M2 | SYSTOLIC BLOOD PRESSURE: 114 MMHG | TEMPERATURE: 97 F | HEIGHT: 64 IN

## 2024-10-09 DIAGNOSIS — M96.1 POSTLAMINECTOMY SYNDROME, LUMBAR REGION: ICD-10-CM

## 2024-10-09 DIAGNOSIS — M62.838 MUSCLE SPASM: ICD-10-CM

## 2024-10-09 DIAGNOSIS — M47.816 LUMBAR SPONDYLOSIS: ICD-10-CM

## 2024-10-09 DIAGNOSIS — M47.812 CERVICAL SPONDYLOSIS: ICD-10-CM

## 2024-10-09 PROCEDURE — G8427 DOCREV CUR MEDS BY ELIG CLIN: HCPCS | Performed by: NURSE PRACTITIONER

## 2024-10-09 PROCEDURE — 99213 OFFICE O/P EST LOW 20 MIN: CPT

## 2024-10-09 PROCEDURE — 1036F TOBACCO NON-USER: CPT | Performed by: NURSE PRACTITIONER

## 2024-10-09 PROCEDURE — 3017F COLORECTAL CA SCREEN DOC REV: CPT | Performed by: NURSE PRACTITIONER

## 2024-10-09 PROCEDURE — 99214 OFFICE O/P EST MOD 30 MIN: CPT | Performed by: NURSE PRACTITIONER

## 2024-10-09 PROCEDURE — G8484 FLU IMMUNIZE NO ADMIN: HCPCS | Performed by: NURSE PRACTITIONER

## 2024-10-09 PROCEDURE — G8417 CALC BMI ABV UP PARAM F/U: HCPCS | Performed by: NURSE PRACTITIONER

## 2024-10-09 RX ORDER — CYCLOBENZAPRINE HCL 10 MG
10 TABLET ORAL NIGHTLY PRN
Qty: 30 TABLET | Refills: 2 | Status: SHIPPED | OUTPATIENT
Start: 2024-10-09 | End: 2025-01-07

## 2024-10-09 ASSESSMENT — ENCOUNTER SYMPTOMS
TROUBLE SWALLOWING: 0
EYES NEGATIVE: 1
GASTROINTESTINAL NEGATIVE: 1
CONSTIPATION: 0
SHORTNESS OF BREATH: 0
DIARRHEA: 0
COUGH: 0

## 2024-10-09 NOTE — PROGRESS NOTES
Sabine Rosenberg  (1975)    10/9/2024    Subjective:     Sabine Rosenbreg is 49 y.o. female who complains today of:    Chief Complaint   Patient presents with    Follow-up    Back Pain    Neck Pain         Allergies:  Adhesive tape, Macrobid [nitrofurantoin monohydrate macrocrystals], and Sulfa antibiotics    Past Medical History:   Diagnosis Date    Anxiety and depression     Arthritis     Asthma     childhood only    Chronic back pain     Depression     Gastrointestinal problem     Irritable bowel syndrome     Mental disorder     PCOS (polycystic ovarian syndrome)     Urinary incontinence     stress     Past Surgical History:   Procedure Laterality Date    BACK SURGERY  ,,2006    lumbar microdiscectomy x 3 -- last 2006    BACK SURGERY  2016    cervical fusion     BLADDER SUSPENSION  2013    BREAST BIOPSY Bilateral 2013    U/S Guided core bx of 2 solid nodules in the right and left breast    CERVICAL FUSION N/A 10/28/2016    ACDF ANTERIOR CERVICAL DISKECTOMY FUSION C5-6 USING CC TRIAD HELIX MINI-PLATE, 1.5 HRS, NUVASIVE REP  performed by Dillon Eddy MD at [facility] [place]     SECTION   &     CHOLECYSTECTOMY      COLONOSCOPY      ELBOW SURGERY      pins in and out, pt broke tip of elbow    ENDOMETRIAL ABLATION      ENDOSCOPY, COLON, DIAGNOSTIC      FRACTURE SURGERY  1985    fx / left elbow / pins in & then removed    GALLBLADDER SURGERY      HERNIA REPAIR  2018    Repair recurrent VH with mesh    HYSTERECTOMY, TOTAL ABDOMINAL (CERVIX REMOVED)  2016    DR RAYA (partial)    NERVE SURGERY N/A 2021    PERMANENT DORSAL COLUMN STIMULATOR PLACEMENT performed by Eliceo Caballero MD at [facility] [place]    SPINE SURGERY  , ,     lumbar    STIMULATOR SURGERY N/A 2021    DORSAL COLUMN STIMULATOR TRIAL performed by Eliceo Caballero MD at [facility] [place]    TUBAL LIGATION      at age 36    UMBILICAL HERNIA REPAIR  10/20/2015    ADEEL JEFFREY MD    VENTRAL HERNIA REPAIR N/A

## 2024-10-21 ENCOUNTER — OFFICE VISIT (OUTPATIENT)
Dept: FAMILY MEDICINE CLINIC | Age: 49
End: 2024-10-21
Payer: COMMERCIAL

## 2024-10-21 VITALS
OXYGEN SATURATION: 99 % | SYSTOLIC BLOOD PRESSURE: 110 MMHG | WEIGHT: 177.4 LBS | BODY MASS INDEX: 30.29 KG/M2 | DIASTOLIC BLOOD PRESSURE: 82 MMHG | HEIGHT: 64 IN | TEMPERATURE: 97.9 F | HEART RATE: 87 BPM

## 2024-10-21 DIAGNOSIS — R53.83 OTHER FATIGUE: ICD-10-CM

## 2024-10-21 DIAGNOSIS — Z00.00 WELL ADULT EXAM: Primary | ICD-10-CM

## 2024-10-21 DIAGNOSIS — J01.90 ACUTE BACTERIAL SINUSITIS: ICD-10-CM

## 2024-10-21 DIAGNOSIS — Z13.220 LIPID SCREENING: ICD-10-CM

## 2024-10-21 DIAGNOSIS — Z12.11 COLON CANCER SCREENING: ICD-10-CM

## 2024-10-21 DIAGNOSIS — B96.89 ACUTE BACTERIAL SINUSITIS: ICD-10-CM

## 2024-10-21 LAB
ALBUMIN SERPL-MCNC: 4.6 G/DL (ref 3.5–4.6)
ALP SERPL-CCNC: 48 U/L (ref 40–130)
ALT SERPL-CCNC: 14 U/L (ref 0–33)
ANION GAP SERPL CALCULATED.3IONS-SCNC: 14 MEQ/L (ref 9–15)
AST SERPL-CCNC: 17 U/L (ref 0–35)
BILIRUB SERPL-MCNC: 0.4 MG/DL (ref 0.2–0.7)
BUN SERPL-MCNC: 15 MG/DL (ref 6–20)
CALCIUM SERPL-MCNC: 9.7 MG/DL (ref 8.5–9.9)
CHLORIDE SERPL-SCNC: 104 MEQ/L (ref 95–107)
CHOLEST SERPL-MCNC: 176 MG/DL (ref 0–199)
CO2 SERPL-SCNC: 23 MEQ/L (ref 20–31)
CREAT SERPL-MCNC: 0.89 MG/DL (ref 0.5–0.9)
GLOBULIN SER CALC-MCNC: 2.6 G/DL (ref 2.3–3.5)
GLUCOSE SERPL-MCNC: 85 MG/DL (ref 70–99)
HDLC SERPL-MCNC: 51 MG/DL (ref 40–59)
LDLC SERPL CALC-MCNC: 109 MG/DL (ref 0–129)
POTASSIUM SERPL-SCNC: 3.9 MEQ/L (ref 3.4–4.9)
PROT SERPL-MCNC: 7.2 G/DL (ref 6.3–8)
SODIUM SERPL-SCNC: 141 MEQ/L (ref 135–144)
TRIGL SERPL-MCNC: 79 MG/DL (ref 0–150)

## 2024-10-21 PROCEDURE — 99396 PREV VISIT EST AGE 40-64: CPT | Performed by: NURSE PRACTITIONER

## 2024-10-21 PROCEDURE — G8484 FLU IMMUNIZE NO ADMIN: HCPCS | Performed by: NURSE PRACTITIONER

## 2024-10-21 RX ORDER — PHENTERMINE HYDROCHLORIDE 37.5 MG/1
37.5 TABLET ORAL
COMMUNITY
Start: 2024-10-03
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
## 2024-11-05 DIAGNOSIS — E66.811 CLASS 1 DRUG-INDUCED OBESITY WITH BODY MASS INDEX (BMI) OF 33.0 TO 33.9 IN ADULT, UNSPECIFIED WHETHER SERIOUS COMORBIDITY PRESENT: ICD-10-CM

## 2024-11-05 DIAGNOSIS — E66.1 CLASS 1 DRUG-INDUCED OBESITY WITH BODY MASS INDEX (BMI) OF 33.0 TO 33.9 IN ADULT, UNSPECIFIED WHETHER SERIOUS COMORBIDITY PRESENT: ICD-10-CM

## 2024-11-06 ENCOUNTER — PATIENT MESSAGE (OUTPATIENT)
Dept: FAMILY MEDICINE CLINIC | Age: 49
End: 2024-11-06

## 2024-11-06 RX ORDER — TOPIRAMATE 25 MG/1
25 TABLET, FILM COATED ORAL 2 TIMES DAILY
Qty: 60 TABLET | Refills: 3 | Status: SHIPPED | OUTPATIENT
Start: 2024-11-06

## 2024-11-06 RX ORDER — PHENTERMINE HYDROCHLORIDE 37.5 MG/1
TABLET ORAL
Qty: 30 TABLET | Refills: 0 | OUTPATIENT
Start: 2024-11-06

## 2024-11-06 NOTE — TELEPHONE ENCOUNTER
Comments:     Last Office Visit (last PCP visit):   10/21/2024    Next Visit Date:  Future Appointments   Date Time Provider Department Center   1/7/2025  8:00 AM Flavia Wilkins APRN - CNP [facility]   1/9/2025  8:15 AM Minda Vallecillo, APRN - CNP [id number]       **If hasn't been seen in over a year OR hasn't followed up according to last diabetes/ADHD visit, make appointment for patient before sending refill to provider.    Rx requested:  Requested Prescriptions     Pending Prescriptions Disp Refills    topiramate (TOPAMAX) 25 MG tablet 60 tablet 3     Sig: Take 1 tablet by mouth 2 times daily

## 2024-11-10 LAB — NONINV COLON CA DNA+OCC BLD SCRN STL QL: NEGATIVE
